# Patient Record
Sex: FEMALE | Race: WHITE | Employment: OTHER | URBAN - METROPOLITAN AREA
[De-identification: names, ages, dates, MRNs, and addresses within clinical notes are randomized per-mention and may not be internally consistent; named-entity substitution may affect disease eponyms.]

---

## 2017-03-24 ENCOUNTER — OFFICE VISIT (OUTPATIENT)
Dept: INTERNAL MEDICINE CLINIC | Age: 67
End: 2017-03-24

## 2017-03-24 VITALS
SYSTOLIC BLOOD PRESSURE: 132 MMHG | HEART RATE: 68 BPM | WEIGHT: 146 LBS | BODY MASS INDEX: 22.13 KG/M2 | DIASTOLIC BLOOD PRESSURE: 84 MMHG | HEIGHT: 68 IN

## 2017-03-24 DIAGNOSIS — E78.00 PURE HYPERCHOLESTEROLEMIA: ICD-10-CM

## 2017-03-24 DIAGNOSIS — M25.512 CHRONIC LEFT SHOULDER PAIN: Primary | ICD-10-CM

## 2017-03-24 DIAGNOSIS — Z11.59 NEED FOR HEPATITIS C SCREENING TEST: ICD-10-CM

## 2017-03-24 DIAGNOSIS — G89.29 CHRONIC LEFT SHOULDER PAIN: Primary | ICD-10-CM

## 2017-03-24 DIAGNOSIS — F41.9 ANXIETY: ICD-10-CM

## 2017-03-24 LAB
ALBUMIN SERPL-MCNC: 4.3 G/DL (ref 3.4–5)
ANION GAP SERPL CALCULATED.3IONS-SCNC: 12 MMOL/L (ref 3–16)
BUN BLDV-MCNC: 12 MG/DL (ref 7–20)
CALCIUM SERPL-MCNC: 9.5 MG/DL (ref 8.3–10.6)
CHLORIDE BLD-SCNC: 99 MMOL/L (ref 99–110)
CHOLESTEROL, TOTAL: 183 MG/DL (ref 0–199)
CO2: 27 MMOL/L (ref 21–32)
CREAT SERPL-MCNC: 0.6 MG/DL (ref 0.6–1.2)
GFR AFRICAN AMERICAN: >60
GFR NON-AFRICAN AMERICAN: >60
GLUCOSE BLD-MCNC: 92 MG/DL (ref 70–99)
HDLC SERPL-MCNC: 61 MG/DL (ref 40–60)
HEPATITIS C ANTIBODY INTERPRETATION: NORMAL
LDL CHOLESTEROL CALCULATED: 98 MG/DL
PHOSPHORUS: 3.9 MG/DL (ref 2.5–4.9)
POTASSIUM SERPL-SCNC: 4.6 MMOL/L (ref 3.5–5.1)
SODIUM BLD-SCNC: 138 MMOL/L (ref 136–145)
TRIGL SERPL-MCNC: 122 MG/DL (ref 0–150)
VLDLC SERPL CALC-MCNC: 24 MG/DL

## 2017-03-24 PROCEDURE — 99214 OFFICE O/P EST MOD 30 MIN: CPT | Performed by: INTERNAL MEDICINE

## 2017-03-24 RX ORDER — ALPRAZOLAM 0.5 MG/1
0.25 TABLET ORAL PRN
Qty: 20 TABLET | Refills: 0 | Status: SHIPPED | OUTPATIENT
Start: 2017-03-24 | End: 2018-03-09 | Stop reason: SDUPTHER

## 2017-03-24 RX ORDER — TIZANIDINE 4 MG/1
2 TABLET ORAL NIGHTLY PRN
Qty: 30 TABLET | Refills: 5 | Status: SHIPPED | OUTPATIENT
Start: 2017-03-24 | End: 2017-09-01 | Stop reason: ALTCHOICE

## 2017-04-05 ENCOUNTER — HOSPITAL ENCOUNTER (OUTPATIENT)
Dept: PHYSICAL THERAPY | Age: 67
Discharge: HOME | End: 2017-04-30
Attending: INTERNAL MEDICINE | Admitting: INTERNAL MEDICINE

## 2017-05-09 ENCOUNTER — HOSPITAL ENCOUNTER (OUTPATIENT)
Dept: PHYSICAL THERAPY | Age: 67
Discharge: HOME | End: 2017-05-09
Admitting: INTERNAL MEDICINE

## 2017-07-05 ENCOUNTER — TELEPHONE (OUTPATIENT)
Dept: INTERNAL MEDICINE CLINIC | Age: 67
End: 2017-07-05

## 2017-07-11 RX ORDER — PROPRANOLOL HYDROCHLORIDE 20 MG/1
TABLET ORAL
Qty: 90 TABLET | Refills: 3 | Status: SHIPPED | OUTPATIENT
Start: 2017-07-11 | End: 2017-12-04 | Stop reason: SDUPTHER

## 2017-08-07 RX ORDER — ATORVASTATIN CALCIUM 40 MG/1
TABLET, FILM COATED ORAL
Qty: 30 TABLET | Refills: 5 | Status: SHIPPED | OUTPATIENT
Start: 2017-08-07 | End: 2018-02-27 | Stop reason: SDUPTHER

## 2017-09-01 ENCOUNTER — OFFICE VISIT (OUTPATIENT)
Dept: INTERNAL MEDICINE CLINIC | Age: 67
End: 2017-09-01

## 2017-09-01 VITALS
DIASTOLIC BLOOD PRESSURE: 82 MMHG | BODY MASS INDEX: 21.46 KG/M2 | HEART RATE: 72 BPM | HEIGHT: 68 IN | SYSTOLIC BLOOD PRESSURE: 128 MMHG | WEIGHT: 141.6 LBS

## 2017-09-01 DIAGNOSIS — Z13.820 OSTEOPOROSIS SCREENING: ICD-10-CM

## 2017-09-01 DIAGNOSIS — I10 ESSENTIAL HYPERTENSION, BENIGN: Primary | ICD-10-CM

## 2017-09-01 DIAGNOSIS — F41.9 ANXIETY: ICD-10-CM

## 2017-09-01 DIAGNOSIS — E78.00 PURE HYPERCHOLESTEROLEMIA: ICD-10-CM

## 2017-09-01 PROCEDURE — 99214 OFFICE O/P EST MOD 30 MIN: CPT | Performed by: INTERNAL MEDICINE

## 2017-09-01 ASSESSMENT — PATIENT HEALTH QUESTIONNAIRE - PHQ9
2. FEELING DOWN, DEPRESSED OR HOPELESS: 1
SUM OF ALL RESPONSES TO PHQ9 QUESTIONS 1 & 2: 2
SUM OF ALL RESPONSES TO PHQ QUESTIONS 1-9: 2
1. LITTLE INTEREST OR PLEASURE IN DOING THINGS: 1

## 2017-10-03 ENCOUNTER — HOSPITAL ENCOUNTER (OUTPATIENT)
Dept: GENERAL RADIOLOGY | Age: 67
Discharge: HOME | End: 2017-10-03
Attending: INTERNAL MEDICINE | Admitting: INTERNAL MEDICINE

## 2017-10-03 DIAGNOSIS — Z13.820 ENCOUNTER FOR SCREENING FOR OSTEOPOROSIS: ICD-10-CM

## 2017-10-03 DIAGNOSIS — Z13.820 OSTEOPOROSIS SCREENING: ICD-10-CM

## 2017-11-10 ENCOUNTER — OFFICE VISIT (OUTPATIENT)
Dept: INTERNAL MEDICINE CLINIC | Age: 67
End: 2017-11-10

## 2017-11-10 VITALS
DIASTOLIC BLOOD PRESSURE: 80 MMHG | HEIGHT: 68 IN | SYSTOLIC BLOOD PRESSURE: 124 MMHG | BODY MASS INDEX: 21.52 KG/M2 | HEART RATE: 68 BPM | WEIGHT: 142 LBS

## 2017-11-10 DIAGNOSIS — Z23 NEED FOR PROPHYLACTIC VACCINATION AGAINST STREPTOCOCCUS PNEUMONIAE (PNEUMOCOCCUS): ICD-10-CM

## 2017-11-10 DIAGNOSIS — R53.82 CHRONIC FATIGUE: ICD-10-CM

## 2017-11-10 DIAGNOSIS — R07.2 SUBSTERNAL CHEST PAIN: Primary | ICD-10-CM

## 2017-11-10 LAB
A/G RATIO: 2.1 (ref 1.1–2.2)
ALBUMIN SERPL-MCNC: 4.4 G/DL (ref 3.4–5)
ALP BLD-CCNC: 67 U/L (ref 40–129)
ALT SERPL-CCNC: 18 U/L (ref 10–40)
ANION GAP SERPL CALCULATED.3IONS-SCNC: 15 MMOL/L (ref 3–16)
AST SERPL-CCNC: 27 U/L (ref 15–37)
BILIRUB SERPL-MCNC: 0.6 MG/DL (ref 0–1)
BUN BLDV-MCNC: 13 MG/DL (ref 7–20)
CALCIUM SERPL-MCNC: 10.6 MG/DL (ref 8.3–10.6)
CHLORIDE BLD-SCNC: 99 MMOL/L (ref 99–110)
CO2: 29 MMOL/L (ref 21–32)
CREAT SERPL-MCNC: 0.7 MG/DL (ref 0.6–1.2)
GFR AFRICAN AMERICAN: >60
GFR NON-AFRICAN AMERICAN: >60
GLOBULIN: 2.1 G/DL
GLUCOSE BLD-MCNC: 84 MG/DL (ref 70–99)
HCT VFR BLD CALC: 45.1 % (ref 36–48)
HEMOGLOBIN: 15.1 G/DL (ref 12–16)
MCH RBC QN AUTO: 30.1 PG (ref 26–34)
MCHC RBC AUTO-ENTMCNC: 33.5 G/DL (ref 31–36)
MCV RBC AUTO: 89.9 FL (ref 80–100)
PDW BLD-RTO: 13 % (ref 12.4–15.4)
PLATELET # BLD: 268 K/UL (ref 135–450)
PMV BLD AUTO: 9.5 FL (ref 5–10.5)
POTASSIUM SERPL-SCNC: 4.5 MMOL/L (ref 3.5–5.1)
RBC # BLD: 5.02 M/UL (ref 4–5.2)
SODIUM BLD-SCNC: 143 MMOL/L (ref 136–145)
TOTAL PROTEIN: 6.5 G/DL (ref 6.4–8.2)
TSH REFLEX FT4: 0.91 UIU/ML (ref 0.27–4.2)
VITAMIN B-12: 515 PG/ML (ref 211–911)
WBC # BLD: 9.2 K/UL (ref 4–11)

## 2017-11-10 PROCEDURE — 90472 IMMUNIZATION ADMIN EACH ADD: CPT | Performed by: INTERNAL MEDICINE

## 2017-11-10 PROCEDURE — 93000 ELECTROCARDIOGRAM COMPLETE: CPT | Performed by: INTERNAL MEDICINE

## 2017-11-10 PROCEDURE — 90732 PPSV23 VACC 2 YRS+ SUBQ/IM: CPT | Performed by: INTERNAL MEDICINE

## 2017-11-10 PROCEDURE — 90662 IIV NO PRSV INCREASED AG IM: CPT | Performed by: INTERNAL MEDICINE

## 2017-11-10 PROCEDURE — 99214 OFFICE O/P EST MOD 30 MIN: CPT | Performed by: INTERNAL MEDICINE

## 2017-11-10 PROCEDURE — 90471 IMMUNIZATION ADMIN: CPT | Performed by: INTERNAL MEDICINE

## 2017-11-10 RX ORDER — CITALOPRAM 20 MG/1
20 TABLET ORAL DAILY
Qty: 30 TABLET | Refills: 5 | Status: SHIPPED | OUTPATIENT
Start: 2017-11-10 | End: 2018-02-27 | Stop reason: SDUPTHER

## 2017-11-10 NOTE — PROGRESS NOTES
Subjective:      Patient ID: Neha Mccrary is a 69 y.o. female. CC: fatigue, Occasional chest discomfort, occasional shortness of breath  HPI  The patient is presenting with 2-3 month history of fatigue and occasional discomfort in her chest.    Chest pain:  She describes a discomfort located in the mid and left side of the chest.  It is intermittent. There are no known aggravating factors. It lasts for about 30 seconds then resolves spontaneously. It is described as mild in severity. The pain does not radiate. She is feeling generally fatigued over the last 2-3 months. She notices that as the day goes on she feels more tired. On occasion she finds herself having mild shortness of breath. Social History   Substance Use Topics    Smoking status: Former Smoker     Quit date: 6/6/2007    Smokeless tobacco: Never Used    Alcohol use No      Comment: none         Review of Systems  Occasional headaches with weather changes. These are chronic. Negative for pain with urination or difficulty urinating  Negative for change in bowel habits  She denies exertional or stress-induced chest pain  Objective:   Physical Exam  /80 (Site: Left Arm, Position: Sitting, Cuff Size: Large Adult)   Pulse 68   Ht 5' 8\" (1.727 m)   Wt 142 lb (64.4 kg)   BMI 21.59 kg/m²    GEN: WN/WD, NAD  CV: regular rate and rhythm, no murmurs rubs or gallops, pedal pulses are 2+ and symmetric  Resp: normal effort, clear auscultation bilaterally  No peripheral edema   Abd: soft, nontender to palpation. NEURO: CN intact, no motor deficits    EKG obtained in the office today and interpreted by me   NSR, 60bpm, normal intervals, no ST segment deviation, no t wave abnormalities  EKG is WNL     Assessment/Plan:      1. Substernal chest pain  Her symptoms, examination, and EKG are reassuring that a cardiac etiology is unlikely. I suspect this is a manifestation of her chronic fatigue syndrome which will be discussed below.   If her

## 2017-12-04 RX ORDER — PROPRANOLOL HYDROCHLORIDE 20 MG/1
TABLET ORAL
Qty: 90 TABLET | Refills: 5 | Status: SHIPPED | OUTPATIENT
Start: 2017-12-04 | End: 2018-02-27 | Stop reason: SDUPTHER

## 2017-12-18 ENCOUNTER — HOSPITAL ENCOUNTER (OUTPATIENT)
Dept: ENDOSCOPY | Age: 67
Discharge: HOME | End: 2017-12-18
Attending: INTERNAL MEDICINE | Admitting: INTERNAL MEDICINE

## 2018-02-26 ENCOUNTER — TELEPHONE (OUTPATIENT)
Dept: INTERNAL MEDICINE CLINIC | Age: 68
End: 2018-02-26

## 2018-02-26 DIAGNOSIS — E78.00 HYPERCHOLESTEROLEMIA: ICD-10-CM

## 2018-02-26 DIAGNOSIS — I10 ESSENTIAL HYPERTENSION: Primary | ICD-10-CM

## 2018-02-27 RX ORDER — CITALOPRAM 20 MG/1
20 TABLET ORAL DAILY
Qty: 90 TABLET | Refills: 1 | Status: SHIPPED | OUTPATIENT
Start: 2018-02-27 | End: 2018-09-13 | Stop reason: SDUPTHER

## 2018-02-27 RX ORDER — PROPRANOLOL HYDROCHLORIDE 20 MG/1
TABLET ORAL
Qty: 270 TABLET | Refills: 1 | Status: SHIPPED | OUTPATIENT
Start: 2018-02-27 | End: 2018-09-13 | Stop reason: SDUPTHER

## 2018-02-27 RX ORDER — ATORVASTATIN CALCIUM 40 MG/1
TABLET, FILM COATED ORAL
Qty: 90 TABLET | Refills: 1 | Status: SHIPPED | OUTPATIENT
Start: 2018-02-27 | End: 2018-09-13 | Stop reason: SDUPTHER

## 2018-03-09 ENCOUNTER — OFFICE VISIT (OUTPATIENT)
Dept: INTERNAL MEDICINE CLINIC | Age: 68
End: 2018-03-09

## 2018-03-09 VITALS
DIASTOLIC BLOOD PRESSURE: 84 MMHG | BODY MASS INDEX: 22.34 KG/M2 | SYSTOLIC BLOOD PRESSURE: 130 MMHG | HEIGHT: 68 IN | WEIGHT: 147.4 LBS | HEART RATE: 60 BPM

## 2018-03-09 DIAGNOSIS — I10 ESSENTIAL HYPERTENSION: ICD-10-CM

## 2018-03-09 DIAGNOSIS — I10 ESSENTIAL HYPERTENSION, BENIGN: Primary | ICD-10-CM

## 2018-03-09 DIAGNOSIS — F41.9 ANXIETY: ICD-10-CM

## 2018-03-09 DIAGNOSIS — E78.00 HYPERCHOLESTEROLEMIA: ICD-10-CM

## 2018-03-09 DIAGNOSIS — E78.00 PURE HYPERCHOLESTEROLEMIA: ICD-10-CM

## 2018-03-09 LAB
ALBUMIN SERPL-MCNC: 5 G/DL (ref 3.4–5)
ANION GAP SERPL CALCULATED.3IONS-SCNC: 15 MMOL/L (ref 3–16)
BUN BLDV-MCNC: 12 MG/DL (ref 7–20)
CALCIUM SERPL-MCNC: 9.6 MG/DL (ref 8.3–10.6)
CHLORIDE BLD-SCNC: 101 MMOL/L (ref 99–110)
CHOLESTEROL, FASTING: 172 MG/DL (ref 0–199)
CO2: 28 MMOL/L (ref 21–32)
CREAT SERPL-MCNC: 0.6 MG/DL (ref 0.6–1.2)
GFR AFRICAN AMERICAN: >60
GFR NON-AFRICAN AMERICAN: >60
GLUCOSE BLD-MCNC: 99 MG/DL (ref 70–99)
HDLC SERPL-MCNC: 65 MG/DL (ref 40–60)
LDL CHOLESTEROL CALCULATED: 79 MG/DL
PHOSPHORUS: 4.4 MG/DL (ref 2.5–4.9)
POTASSIUM SERPL-SCNC: 4.6 MMOL/L (ref 3.5–5.1)
SODIUM BLD-SCNC: 144 MMOL/L (ref 136–145)
TRIGLYCERIDE, FASTING: 138 MG/DL (ref 0–150)
VLDLC SERPL CALC-MCNC: 28 MG/DL

## 2018-03-09 PROCEDURE — 99214 OFFICE O/P EST MOD 30 MIN: CPT | Performed by: INTERNAL MEDICINE

## 2018-03-09 RX ORDER — ALPRAZOLAM 0.5 MG/1
0.25 TABLET ORAL PRN
Qty: 20 TABLET | Refills: 0 | Status: SHIPPED | OUTPATIENT
Start: 2018-03-09 | End: 2019-03-14 | Stop reason: SDUPTHER

## 2018-03-09 NOTE — PROGRESS NOTES
Subjective:      Patient ID: Neha Horowitz is a 69 y.o. female. CC: HTN, HLD, anxiety  HPI  HTN: Tolerating medications well and taking them as directed. No symptoms concerning for end organ damage are present. HLD: she is taking atorvastatin as directed. She is tolerating this well. Anxiety: she has chronic anxiety with associated panic attacks. Aggravating factors include driving and being in unfamiliar environments. She gets relief with alprazolam which she uses infrequently. She also takes citalopram daily. Social History   Substance Use Topics    Smoking status: Former Smoker     Packs/day: 1.50     Years: 40.00     Quit date: 6/6/2007    Smokeless tobacco: Never Used    Alcohol use No      Comment: none       Review of Systems  CV: Neg for chest pain  RESP: neg for dyspnea   : no urinary problems  GI: Reg BM's  MSK: +joint stiffness    Objective:   Physical Exam  Blood pressure 130/84, pulse 60, height 5' 8\" (1.727 m), weight 147 lb 6.4 oz (66.9 kg). GEN: WN/WD, NAD  CV: regular rate and rhythm, no murmurs rubs or gallops  Resp: normal effort, clear auscultation bilaterally  No peripheral edema   Abd: soft, nontender to palpation.    Psych: Normal mood and affect    Lab Results   Component Value Date    CREATININE 0.7 11/10/2017    BUN 13 11/10/2017     11/10/2017    K 4.5 11/10/2017    CL 99 11/10/2017    CO2 29 11/10/2017      Lab Results   Component Value Date    CHOL 183 03/24/2017    CHOL 160 03/21/2016    CHOL 153 06/15/2015    CHOL 95 06/15/2015     Lab Results   Component Value Date    TRIG 122 03/24/2017    TRIG 89 03/21/2016    TRIG 89 06/15/2015     Lab Results   Component Value Date    HDL 61 (H) 03/24/2017    HDL 61 (H) 03/21/2016    HDL 58 06/15/2015     Lab Results   Component Value Date    LDLCALC 98 03/24/2017    LDLCALC 81 03/21/2016    LDLCALC 77 06/15/2015     Lab Results   Component Value Date    LABVLDL 24 03/24/2017    LABVLDL 18 03/21/2016    LABVLDL 28

## 2018-03-14 LAB
6-ACETYLMORPHINE: NOT DETECTED
7-AMINOCLONAZEPAM: NOT DETECTED
ALPHA-OH-ALPRAZOLAM: NOT DETECTED
ALPRAZOLAM: NOT DETECTED
AMPHETAMINE: NOT DETECTED
BARBITURATES: NOT DETECTED
BENZOYLECGONINE: NOT DETECTED
BUPRENORPHINE: NOT DETECTED
CARISOPRODOL: NOT DETECTED
CLONAZEPAM: NOT DETECTED
CODEINE: NOT DETECTED
CREATININE URINE: 20.6 MG/DL (ref 20–400)
DIAZEPAM: NOT DETECTED
DRUGS EXPECTED: NORMAL
EER PAIN MGT DRUG PANEL, HIGH RES/EMIT U: NORMAL
ETHYL GLUCURONIDE: NOT DETECTED
FENTANYL: NOT DETECTED
HYDROCODONE: NOT DETECTED
HYDROMORPHONE: NOT DETECTED
LORAZEPAM: NOT DETECTED
MARIJUANA METABOLITE: NOT DETECTED
MDA: NOT DETECTED
MDEA: NOT DETECTED
MDMA URINE: NOT DETECTED
MEPERIDINE: NOT DETECTED
METHADONE: NOT DETECTED
METHAMPHETAMINE: NOT DETECTED
METHYLPHENIDATE: NOT DETECTED
MIDAZOLAM: NOT DETECTED
MORPHINE: NOT DETECTED
NORBUPRENORPHINE, FREE: NOT DETECTED
NORDIAZEPAM: NOT DETECTED
NORFENTANYL: NOT DETECTED
NORHYDROCODONE, URINE: NOT DETECTED
NOROXYCODONE: NOT DETECTED
NOROXYMORPHONE, URINE: NOT DETECTED
OXAZEPAM: NOT DETECTED
OXYCODONE: NOT DETECTED
OXYMORPHONE: NOT DETECTED
PAIN MANAGEMENT DRUG PANEL: NORMAL
PAIN MANAGEMENT DRUG PANEL: NORMAL
PCP: NOT DETECTED
PHENTERMINE: NOT DETECTED
PROPOXYPHENE: NOT DETECTED
TAPENTADOL, URINE: NOT DETECTED
TAPENTADOL-O-SULFATE, URINE: NOT DETECTED
TEMAZEPAM: NOT DETECTED
TRAMADOL: NOT DETECTED
ZOLPIDEM: NOT DETECTED

## 2018-06-06 ENCOUNTER — OFFICE VISIT (OUTPATIENT)
Dept: INTERNAL MEDICINE CLINIC | Age: 68
End: 2018-06-06

## 2018-06-06 VITALS
BODY MASS INDEX: 22.35 KG/M2 | TEMPERATURE: 97.8 F | HEART RATE: 76 BPM | WEIGHT: 147 LBS | DIASTOLIC BLOOD PRESSURE: 86 MMHG | SYSTOLIC BLOOD PRESSURE: 134 MMHG

## 2018-06-06 DIAGNOSIS — J00 ACUTE NASOPHARYNGITIS: Primary | ICD-10-CM

## 2018-06-06 PROCEDURE — 99213 OFFICE O/P EST LOW 20 MIN: CPT | Performed by: NURSE PRACTITIONER

## 2018-06-06 ASSESSMENT — ENCOUNTER SYMPTOMS
COUGH: 1
VOICE CHANGE: 1
SORE THROAT: 1
WHEEZING: 0
RHINORRHEA: 1
SINUS PAIN: 0

## 2018-06-08 ENCOUNTER — TELEPHONE (OUTPATIENT)
Dept: INTERNAL MEDICINE CLINIC | Age: 68
End: 2018-06-08

## 2018-06-08 RX ORDER — AZITHROMYCIN 250 MG/1
TABLET, FILM COATED ORAL
Qty: 1 PACKET | Refills: 0 | Status: SHIPPED | OUTPATIENT
Start: 2018-06-08 | End: 2018-06-18

## 2018-08-31 ENCOUNTER — TELEPHONE (OUTPATIENT)
Dept: FAMILY MEDICINE CLINIC | Age: 68
End: 2018-08-31

## 2018-09-10 ENCOUNTER — OFFICE VISIT (OUTPATIENT)
Dept: INTERNAL MEDICINE CLINIC | Age: 68
End: 2018-09-10

## 2018-09-10 VITALS
HEIGHT: 68 IN | BODY MASS INDEX: 22.43 KG/M2 | SYSTOLIC BLOOD PRESSURE: 120 MMHG | DIASTOLIC BLOOD PRESSURE: 74 MMHG | WEIGHT: 148 LBS | HEART RATE: 76 BPM

## 2018-09-10 DIAGNOSIS — M76.62 ACHILLES TENDINITIS, LEFT LEG: Primary | ICD-10-CM

## 2018-09-10 PROCEDURE — 99213 OFFICE O/P EST LOW 20 MIN: CPT | Performed by: INTERNAL MEDICINE

## 2018-09-10 ASSESSMENT — PATIENT HEALTH QUESTIONNAIRE - PHQ9
SUM OF ALL RESPONSES TO PHQ QUESTIONS 1-9: 2
SUM OF ALL RESPONSES TO PHQ9 QUESTIONS 1 & 2: 2
SUM OF ALL RESPONSES TO PHQ QUESTIONS 1-9: 2
2. FEELING DOWN, DEPRESSED OR HOPELESS: 1
1. LITTLE INTEREST OR PLEASURE IN DOING THINGS: 1

## 2018-09-13 ENCOUNTER — OFFICE VISIT (OUTPATIENT)
Dept: INTERNAL MEDICINE CLINIC | Age: 68
End: 2018-09-13

## 2018-09-13 VITALS
HEIGHT: 68 IN | DIASTOLIC BLOOD PRESSURE: 82 MMHG | SYSTOLIC BLOOD PRESSURE: 126 MMHG | WEIGHT: 147.8 LBS | BODY MASS INDEX: 22.4 KG/M2 | HEART RATE: 68 BPM

## 2018-09-13 DIAGNOSIS — E78.00 PURE HYPERCHOLESTEROLEMIA: ICD-10-CM

## 2018-09-13 DIAGNOSIS — I10 ESSENTIAL HYPERTENSION, BENIGN: Primary | ICD-10-CM

## 2018-09-13 DIAGNOSIS — F41.9 ANXIETY: ICD-10-CM

## 2018-09-13 PROCEDURE — 99214 OFFICE O/P EST MOD 30 MIN: CPT | Performed by: INTERNAL MEDICINE

## 2018-09-13 RX ORDER — OMEPRAZOLE 10 MG/1
10 CAPSULE, DELAYED RELEASE ORAL DAILY
COMMUNITY
End: 2019-03-14

## 2018-09-13 RX ORDER — PROPRANOLOL HYDROCHLORIDE 20 MG/1
TABLET ORAL
Qty: 270 TABLET | Refills: 3 | Status: SHIPPED | OUTPATIENT
Start: 2018-09-13 | End: 2019-09-10 | Stop reason: SDUPTHER

## 2018-09-13 RX ORDER — CITALOPRAM 20 MG/1
20 TABLET ORAL DAILY
Qty: 90 TABLET | Refills: 3 | Status: SHIPPED | OUTPATIENT
Start: 2018-09-13 | End: 2019-09-10 | Stop reason: SDUPTHER

## 2018-09-13 RX ORDER — ATORVASTATIN CALCIUM 40 MG/1
TABLET, FILM COATED ORAL
Qty: 90 TABLET | Refills: 3 | Status: SHIPPED | OUTPATIENT
Start: 2018-09-13 | End: 2019-09-10 | Stop reason: SDUPTHER

## 2018-09-13 NOTE — PROGRESS NOTES
Results   Component Value Date    LOUANN 2.6 06/15/2015      A/P  1. Essential hypertension, benign  Well controlled  BW UTD  The current medical regimen is effective;  continue present plan and medications. 2. Pure hypercholesterolemia  Well controlled  The current medical regimen is effective;  continue present plan and medications.      3. Anxiety  Slight increase in anxiety due to external stress (friend diagnosed with Parkinson's)  Recommended visit with Dr. Richmond Romero  Continue citalopram    RTO 6 months or PRN

## 2018-10-01 ENCOUNTER — OFFICE VISIT (OUTPATIENT)
Dept: PSYCHOLOGY | Age: 68
End: 2018-10-01
Payer: COMMERCIAL

## 2018-10-01 DIAGNOSIS — F43.22 ADJUSTMENT DISORDER WITH ANXIOUS MOOD: Primary | ICD-10-CM

## 2018-10-01 PROCEDURE — 90791 PSYCH DIAGNOSTIC EVALUATION: CPT | Performed by: PSYCHOLOGIST

## 2018-10-01 ASSESSMENT — PATIENT HEALTH QUESTIONNAIRE - PHQ9
2. FEELING DOWN, DEPRESSED OR HOPELESS: 1
8. MOVING OR SPEAKING SO SLOWLY THAT OTHER PEOPLE COULD HAVE NOTICED. OR THE OPPOSITE, BEING SO FIGETY OR RESTLESS THAT YOU HAVE BEEN MOVING AROUND A LOT MORE THAN USUAL: 0
3. TROUBLE FALLING OR STAYING ASLEEP: 1
SUM OF ALL RESPONSES TO PHQ QUESTIONS 1-9: 6
9. THOUGHTS THAT YOU WOULD BE BETTER OFF DEAD, OR OF HURTING YOURSELF: 0
SUM OF ALL RESPONSES TO PHQ9 QUESTIONS 1 & 2: 2
6. FEELING BAD ABOUT YOURSELF - OR THAT YOU ARE A FAILURE OR HAVE LET YOURSELF OR YOUR FAMILY DOWN: 0
1. LITTLE INTEREST OR PLEASURE IN DOING THINGS: 1
SUM OF ALL RESPONSES TO PHQ QUESTIONS 1-9: 6
10. IF YOU CHECKED OFF ANY PROBLEMS, HOW DIFFICULT HAVE THESE PROBLEMS MADE IT FOR YOU TO DO YOUR WORK, TAKE CARE OF THINGS AT HOME, OR GET ALONG WITH OTHER PEOPLE: 1
4. FEELING TIRED OR HAVING LITTLE ENERGY: 2
5. POOR APPETITE OR OVEREATING: 1
7. TROUBLE CONCENTRATING ON THINGS, SUCH AS READING THE NEWSPAPER OR WATCHING TELEVISION: 0

## 2018-11-14 ENCOUNTER — OFFICE VISIT (OUTPATIENT)
Dept: PSYCHOLOGY | Age: 68
End: 2018-11-14
Payer: COMMERCIAL

## 2018-11-14 DIAGNOSIS — F43.22 ADJUSTMENT DISORDER WITH ANXIOUS MOOD: Primary | ICD-10-CM

## 2018-11-14 PROCEDURE — 90832 PSYTX W PT 30 MINUTES: CPT | Performed by: PSYCHOLOGIST

## 2018-11-14 NOTE — PATIENT INSTRUCTIONS
Follow-up with Dr. Gely Rodriguez as needed.  If the following occur, call our office to schedule an appointment:   - If spending a lot more time worrying  - If struggling with sleep more often  - If withdrawing more in the afternoons

## 2018-11-14 NOTE — PROGRESS NOTES
boyfriend     Other Topics Concern    Not on file     Social History Narrative    No narrative on file     TOBACCO:   reports that she quit smoking about 11 years ago. She has a 60.00 pack-year smoking history. She has never used smokeless tobacco.  ETOH:   reports that she does not drink alcohol. Diagnosis:    Adjustment disorder with anxiety    Plan:  Pt interventions:  Trained in improving communication skills, Discussed and problem-solved barriers in adhering to behavioral change plan, Identified maladaptive thoughts and Collaboratively set goals with pt re: communication and relapse prevention        Documentation was done using voice recognition dragon software. Every effort was made to ensure accuracy; however, inadvertent, unintentional computerized transcription errors may be present.

## 2018-11-20 ENCOUNTER — IMMUNIZATION (OUTPATIENT)
Dept: INTERNAL MEDICINE CLINIC | Age: 68
End: 2018-11-20
Payer: COMMERCIAL

## 2018-11-20 DIAGNOSIS — Z23 NEED FOR INFLUENZA VACCINATION: Primary | ICD-10-CM

## 2018-11-20 PROCEDURE — 90662 IIV NO PRSV INCREASED AG IM: CPT | Performed by: INTERNAL MEDICINE

## 2018-11-20 PROCEDURE — 90471 IMMUNIZATION ADMIN: CPT | Performed by: INTERNAL MEDICINE

## 2019-03-14 ENCOUNTER — OFFICE VISIT (OUTPATIENT)
Dept: INTERNAL MEDICINE CLINIC | Age: 69
End: 2019-03-14
Payer: COMMERCIAL

## 2019-03-14 VITALS
WEIGHT: 151.8 LBS | SYSTOLIC BLOOD PRESSURE: 114 MMHG | DIASTOLIC BLOOD PRESSURE: 72 MMHG | HEIGHT: 68 IN | HEART RATE: 76 BPM | BODY MASS INDEX: 23.01 KG/M2

## 2019-03-14 DIAGNOSIS — E78.00 PURE HYPERCHOLESTEROLEMIA: ICD-10-CM

## 2019-03-14 DIAGNOSIS — F41.9 ANXIETY: ICD-10-CM

## 2019-03-14 DIAGNOSIS — I10 ESSENTIAL HYPERTENSION, BENIGN: Primary | ICD-10-CM

## 2019-03-14 LAB
ALBUMIN SERPL-MCNC: 4.8 G/DL (ref 3.4–5)
ANION GAP SERPL CALCULATED.3IONS-SCNC: 17 MMOL/L (ref 3–16)
BUN BLDV-MCNC: 17 MG/DL (ref 7–20)
CALCIUM SERPL-MCNC: 10.1 MG/DL (ref 8.3–10.6)
CHLORIDE BLD-SCNC: 99 MMOL/L (ref 99–110)
CHOLESTEROL, TOTAL: 170 MG/DL (ref 0–199)
CO2: 25 MMOL/L (ref 21–32)
CREAT SERPL-MCNC: 0.7 MG/DL (ref 0.6–1.2)
GFR AFRICAN AMERICAN: >60
GFR NON-AFRICAN AMERICAN: >60
GLUCOSE BLD-MCNC: 93 MG/DL (ref 70–99)
HDLC SERPL-MCNC: 65 MG/DL (ref 40–60)
LDL CHOLESTEROL CALCULATED: 82 MG/DL
PHOSPHORUS: 4.3 MG/DL (ref 2.5–4.9)
POTASSIUM SERPL-SCNC: 4.6 MMOL/L (ref 3.5–5.1)
SODIUM BLD-SCNC: 141 MMOL/L (ref 136–145)
TRIGL SERPL-MCNC: 113 MG/DL (ref 0–150)
VLDLC SERPL CALC-MCNC: 23 MG/DL

## 2019-03-14 PROCEDURE — 99214 OFFICE O/P EST MOD 30 MIN: CPT | Performed by: INTERNAL MEDICINE

## 2019-03-14 RX ORDER — ALPRAZOLAM 0.5 MG/1
0.25 TABLET ORAL PRN
Qty: 20 TABLET | Refills: 0 | Status: SHIPPED | OUTPATIENT
Start: 2019-03-14 | End: 2020-05-19 | Stop reason: SDUPTHER

## 2019-03-14 RX ORDER — ALPRAZOLAM 0.5 MG/1
0.5 TABLET ORAL NIGHTLY PRN
COMMUNITY
End: 2019-03-14 | Stop reason: SDUPTHER

## 2019-09-10 ENCOUNTER — OFFICE VISIT (OUTPATIENT)
Dept: INTERNAL MEDICINE CLINIC | Age: 69
End: 2019-09-10
Payer: COMMERCIAL

## 2019-09-10 VITALS
HEART RATE: 72 BPM | BODY MASS INDEX: 22.25 KG/M2 | WEIGHT: 146.8 LBS | HEIGHT: 68 IN | DIASTOLIC BLOOD PRESSURE: 68 MMHG | SYSTOLIC BLOOD PRESSURE: 122 MMHG

## 2019-09-10 DIAGNOSIS — E78.00 PURE HYPERCHOLESTEROLEMIA: ICD-10-CM

## 2019-09-10 DIAGNOSIS — F41.9 ANXIETY: ICD-10-CM

## 2019-09-10 DIAGNOSIS — I10 ESSENTIAL HYPERTENSION, BENIGN: Primary | ICD-10-CM

## 2019-09-10 DIAGNOSIS — Z87.891 PERSONAL HISTORY OF TOBACCO USE: ICD-10-CM

## 2019-09-10 PROCEDURE — G0296 VISIT TO DETERM LDCT ELIG: HCPCS | Performed by: INTERNAL MEDICINE

## 2019-09-10 PROCEDURE — 99214 OFFICE O/P EST MOD 30 MIN: CPT | Performed by: INTERNAL MEDICINE

## 2019-09-10 RX ORDER — CITALOPRAM 20 MG/1
20 TABLET ORAL DAILY
Qty: 90 TABLET | Refills: 3 | Status: SHIPPED | OUTPATIENT
Start: 2019-09-10 | End: 2020-09-08

## 2019-09-10 RX ORDER — PROPRANOLOL HYDROCHLORIDE 20 MG/1
TABLET ORAL
Qty: 270 TABLET | Refills: 3 | Status: SHIPPED | OUTPATIENT
Start: 2019-09-10 | End: 2020-09-08

## 2019-09-10 RX ORDER — ATORVASTATIN CALCIUM 40 MG/1
TABLET, FILM COATED ORAL
Qty: 90 TABLET | Refills: 3 | Status: SHIPPED | OUTPATIENT
Start: 2019-09-10 | End: 2020-12-07

## 2019-09-10 NOTE — PROGRESS NOTES
03/21/2016     Lab Results   Component Value Date    HDL 65 (H) 03/14/2019    HDL 65 (H) 03/09/2018    HDL 61 (H) 03/24/2017     Lab Results   Component Value Date    LDLCALC 82 03/14/2019    LDLCALC 79 03/09/2018    LDLCALC 98 03/24/2017     Lab Results   Component Value Date    LABVLDL 23 03/14/2019    LABVLDL 28 03/09/2018    LABVLDL 24 03/24/2017     Lab Results   Component Value Date    CHOLHDLRATIO 2.6 06/15/2015      A/P  1. Essential hypertension, benign  Well-controlled. Blood work is up-to-date. Continue propranolol. 2. Pure hypercholesterolemia  Well-controlled. Continue atorvastatin. 3. Anxiety  Stable, controlled. Continue citalopram and as needed Xanax. 4. Personal history of tobacco use    - UT VISIT TO DISCUSS LUNG CA SCREEN W LDCT  - CT Lung Screening; Future       Low Dose CT (LDCT) Lung Screening criteria met   Age 50-69   Pack year smoking >30   Still smoking or less than 15 year since quit   No sign or symptoms of lung cancer   > 11 months since last LDCT     Risks and benefits of lung cancer screening with LDCT scans discussed:    Significance of positive screen - False-positive LDCT results often occur. 95% of all positive results do not lead to a diagnosis of cancer. Usually further imaging can resolve most false-positive results; however, some patients may require invasive procedures. Over diagnosis risk - 10% to 12% of screen-detected lung cancer cases are over diagnosed--that is, the cancer would not have been detected in the patient's lifetime without the screening. Need for follow up screens annually to continue lung cancer screening effectiveness     Risks associated with radiation from annual LDCT- Radiation exposure is about the same as for a mammogram, which is about 1/3 of the annual background radiation exposure from everyday life. Starting screening at age 54 is not likely to increase cancer risk from radiation exposure.     Patients with comorbidities resulting

## 2019-10-31 DIAGNOSIS — Z23 NEED FOR INFLUENZA VACCINATION: Primary | ICD-10-CM

## 2019-10-31 PROCEDURE — 90471 IMMUNIZATION ADMIN: CPT | Performed by: INTERNAL MEDICINE

## 2019-10-31 PROCEDURE — 90653 IIV ADJUVANT VACCINE IM: CPT | Performed by: INTERNAL MEDICINE

## 2019-11-20 ENCOUNTER — HOSPITAL ENCOUNTER (OUTPATIENT)
Dept: CT IMAGING | Age: 69
Discharge: HOME | End: 2019-11-20
Payer: COMMERCIAL

## 2019-11-20 ENCOUNTER — TELEPHONE (OUTPATIENT)
Dept: INTERNAL MEDICINE CLINIC | Age: 69
End: 2019-11-20

## 2019-11-20 DIAGNOSIS — Z87.891 PERSONAL HISTORY OF TOBACCO USE: ICD-10-CM

## 2019-11-20 PROCEDURE — G0297 LDCT FOR LUNG CA SCREEN: HCPCS

## 2019-11-27 ENCOUNTER — TELEPHONE (OUTPATIENT)
Dept: PULMONOLOGY | Age: 69
End: 2019-11-27

## 2019-11-27 ENCOUNTER — OFFICE VISIT (OUTPATIENT)
Dept: PULMONOLOGY | Age: 69
End: 2019-11-27
Payer: COMMERCIAL

## 2019-11-27 VITALS
BODY MASS INDEX: 22.2 KG/M2 | RESPIRATION RATE: 16 BRPM | SYSTOLIC BLOOD PRESSURE: 120 MMHG | DIASTOLIC BLOOD PRESSURE: 70 MMHG | WEIGHT: 146 LBS | HEART RATE: 98 BPM | OXYGEN SATURATION: 97 %

## 2019-11-27 DIAGNOSIS — R59.0 MEDIASTINAL ADENOPATHY: ICD-10-CM

## 2019-11-27 DIAGNOSIS — R91.8 LUNG MASS: Primary | ICD-10-CM

## 2019-11-27 PROCEDURE — 99244 OFF/OP CNSLTJ NEW/EST MOD 40: CPT | Performed by: INTERNAL MEDICINE

## 2019-11-27 ASSESSMENT — ENCOUNTER SYMPTOMS
DIARRHEA: 0
CONSTIPATION: 0
SINUS PRESSURE: 0
STRIDOR: 0
CHOKING: 0
ABDOMINAL PAIN: 0
SHORTNESS OF BREATH: 0
VOICE CHANGE: 0
ANAL BLEEDING: 0
SORE THROAT: 0
WHEEZING: 0
RHINORRHEA: 0
CHEST TIGHTNESS: 0
ABDOMINAL DISTENTION: 0
BLOOD IN STOOL: 0
BACK PAIN: 0
APNEA: 0
COUGH: 0

## 2019-12-03 ENCOUNTER — ANESTHESIA EVENT (OUTPATIENT)
Dept: ENDOSCOPY | Age: 69
End: 2019-12-03
Payer: COMMERCIAL

## 2019-12-03 ENCOUNTER — HOSPITAL ENCOUNTER (OUTPATIENT)
Age: 69
End: 2019-12-03
Payer: COMMERCIAL

## 2019-12-04 ENCOUNTER — HOSPITAL ENCOUNTER (OUTPATIENT)
Dept: PET IMAGING | Age: 69
Discharge: HOME | End: 2019-12-04
Payer: COMMERCIAL

## 2019-12-04 VITALS — HEIGHT: 68 IN | WEIGHT: 146 LBS | BODY MASS INDEX: 22.13 KG/M2

## 2019-12-04 DIAGNOSIS — R91.8 LUNG MASS: ICD-10-CM

## 2019-12-04 DIAGNOSIS — R59.0 MEDIASTINAL ADENOPATHY: ICD-10-CM

## 2019-12-04 PROCEDURE — A9552 F18 FDG: HCPCS | Performed by: INTERNAL MEDICINE

## 2019-12-04 PROCEDURE — 3430000000 HC RX DIAGNOSTIC RADIOPHARMACEUTICAL: Performed by: INTERNAL MEDICINE

## 2019-12-04 PROCEDURE — 78815 PET IMAGE W/CT SKULL-THIGH: CPT

## 2019-12-04 RX ORDER — FLUDEOXYGLUCOSE F 18 200 MCI/ML
16.48 INJECTION, SOLUTION INTRAVENOUS
Status: COMPLETED | OUTPATIENT
Start: 2019-12-04 | End: 2019-12-04

## 2019-12-04 RX ADMIN — FLUDEOXYGLUCOSE F 18 16.48 MILLICURIE: 200 INJECTION, SOLUTION INTRAVENOUS at 11:42

## 2019-12-06 ENCOUNTER — APPOINTMENT (OUTPATIENT)
Dept: GENERAL RADIOLOGY | Age: 69
End: 2019-12-06
Attending: INTERNAL MEDICINE
Payer: COMMERCIAL

## 2019-12-06 ENCOUNTER — ANESTHESIA (OUTPATIENT)
Dept: ENDOSCOPY | Age: 69
End: 2019-12-06
Payer: COMMERCIAL

## 2019-12-06 ENCOUNTER — HOSPITAL ENCOUNTER (OUTPATIENT)
Age: 69
Setting detail: OUTPATIENT SURGERY
Discharge: HOME | End: 2019-12-06
Attending: INTERNAL MEDICINE | Admitting: INTERNAL MEDICINE
Payer: COMMERCIAL

## 2019-12-06 VITALS
RESPIRATION RATE: 4 BRPM | SYSTOLIC BLOOD PRESSURE: 165 MMHG | DIASTOLIC BLOOD PRESSURE: 80 MMHG | OXYGEN SATURATION: 99 %

## 2019-12-06 VITALS
OXYGEN SATURATION: 96 % | SYSTOLIC BLOOD PRESSURE: 176 MMHG | RESPIRATION RATE: 16 BRPM | HEART RATE: 80 BPM | TEMPERATURE: 97.5 F | DIASTOLIC BLOOD PRESSURE: 77 MMHG

## 2019-12-06 LAB
HCT VFR BLD CALC: 46.2 % (ref 36–48)
HEMOGLOBIN: 15.7 G/DL (ref 12–16)
INR BLD: 0.96 (ref 0.86–1.14)
MCH RBC QN AUTO: 30 PG (ref 26–34)
MCHC RBC AUTO-ENTMCNC: 33.9 G/DL (ref 31–36)
MCV RBC AUTO: 88.4 FL (ref 80–100)
PDW BLD-RTO: 13.3 % (ref 12.4–15.4)
PLATELET # BLD: 275 K/UL (ref 135–450)
PMV BLD AUTO: 8.9 FL (ref 5–10.5)
PROTHROMBIN TIME: 11.1 SEC (ref 10–13.2)
RBC # BLD: 5.22 M/UL (ref 4–5.2)
WBC # BLD: 8.2 K/UL (ref 4–11)

## 2019-12-06 PROCEDURE — 88172 CYTP DX EVAL FNA 1ST EA SITE: CPT

## 2019-12-06 PROCEDURE — 88112 CYTOPATH CELL ENHANCE TECH: CPT

## 2019-12-06 PROCEDURE — 3209999900 FLUORO FOR SURGICAL PROCEDURES

## 2019-12-06 PROCEDURE — 3609155400 HC ADD ON COMPUTER ASSISTED NAVIGATION: Performed by: INTERNAL MEDICINE

## 2019-12-06 PROCEDURE — 2709999900 HC NON-CHARGEABLE SUPPLY: Performed by: INTERNAL MEDICINE

## 2019-12-06 PROCEDURE — 3609011100 HC BRONCHOSCOPY BRUSHINGS: Performed by: INTERNAL MEDICINE

## 2019-12-06 PROCEDURE — 85027 COMPLETE CBC AUTOMATED: CPT

## 2019-12-06 PROCEDURE — 7100000001 HC PACU RECOVERY - ADDTL 15 MIN: Performed by: INTERNAL MEDICINE

## 2019-12-06 PROCEDURE — 71045 X-RAY EXAM CHEST 1 VIEW: CPT

## 2019-12-06 PROCEDURE — 7100000000 HC PACU RECOVERY - FIRST 15 MIN: Performed by: INTERNAL MEDICINE

## 2019-12-06 PROCEDURE — 2500000003 HC RX 250 WO HCPCS: Performed by: NURSE ANESTHETIST, CERTIFIED REGISTERED

## 2019-12-06 PROCEDURE — 88305 TISSUE EXAM BY PATHOLOGIST: CPT

## 2019-12-06 PROCEDURE — 7100000011 HC PHASE II RECOVERY - ADDTL 15 MIN: Performed by: INTERNAL MEDICINE

## 2019-12-06 PROCEDURE — C1725 CATH, TRANSLUMIN NON-LASER: HCPCS | Performed by: INTERNAL MEDICINE

## 2019-12-06 PROCEDURE — 2720000010 HC SURG SUPPLY STERILE: Performed by: INTERNAL MEDICINE

## 2019-12-06 PROCEDURE — C1887 CATHETER, GUIDING: HCPCS | Performed by: INTERNAL MEDICINE

## 2019-12-06 PROCEDURE — 85610 PROTHROMBIN TIME: CPT

## 2019-12-06 PROCEDURE — 31623 DX BRONCHOSCOPE/BRUSH: CPT | Performed by: INTERNAL MEDICINE

## 2019-12-06 PROCEDURE — 88341 IMHCHEM/IMCYTCHM EA ADD ANTB: CPT

## 2019-12-06 PROCEDURE — 3609020000 HC BRONCHOSCOPY W/EBUS FNA: Performed by: INTERNAL MEDICINE

## 2019-12-06 PROCEDURE — 31624 DX BRONCHOSCOPE/LAVAGE: CPT | Performed by: INTERNAL MEDICINE

## 2019-12-06 PROCEDURE — 3609011300 HC BRONCHOSCOPY BRONCHIAL/ENDOBRNCL BX 1+ SITES: Performed by: INTERNAL MEDICINE

## 2019-12-06 PROCEDURE — 3700000000 HC ANESTHESIA ATTENDED CARE: Performed by: INTERNAL MEDICINE

## 2019-12-06 PROCEDURE — 6360000002 HC RX W HCPCS: Performed by: NURSE ANESTHETIST, CERTIFIED REGISTERED

## 2019-12-06 PROCEDURE — 88342 IMHCHEM/IMCYTCHM 1ST ANTB: CPT

## 2019-12-06 PROCEDURE — 2580000003 HC RX 258: Performed by: FAMILY MEDICINE

## 2019-12-06 PROCEDURE — 88173 CYTOPATH EVAL FNA REPORT: CPT

## 2019-12-06 PROCEDURE — 88177 CYTP FNA EVAL EA ADDL: CPT

## 2019-12-06 PROCEDURE — 7100000010 HC PHASE II RECOVERY - FIRST 15 MIN: Performed by: INTERNAL MEDICINE

## 2019-12-06 PROCEDURE — 36415 COLL VENOUS BLD VENIPUNCTURE: CPT

## 2019-12-06 PROCEDURE — 31652 BRONCH EBUS SAMPLNG 1/2 NODE: CPT | Performed by: INTERNAL MEDICINE

## 2019-12-06 PROCEDURE — 31628 BRONCHOSCOPY/LUNG BX EACH: CPT | Performed by: INTERNAL MEDICINE

## 2019-12-06 PROCEDURE — 31627 NAVIGATIONAL BRONCHOSCOPY: CPT | Performed by: INTERNAL MEDICINE

## 2019-12-06 PROCEDURE — 3609010800 HC BRONCHOSCOPY ALVEOLAR LAVAGE: Performed by: INTERNAL MEDICINE

## 2019-12-06 PROCEDURE — 3700000001 HC ADD 15 MINUTES (ANESTHESIA): Performed by: INTERNAL MEDICINE

## 2019-12-06 RX ORDER — ONDANSETRON 2 MG/ML
INJECTION INTRAMUSCULAR; INTRAVENOUS PRN
Status: DISCONTINUED | OUTPATIENT
Start: 2019-12-06 | End: 2019-12-06 | Stop reason: SDUPTHER

## 2019-12-06 RX ORDER — PROPOFOL 10 MG/ML
INJECTION, EMULSION INTRAVENOUS PRN
Status: DISCONTINUED | OUTPATIENT
Start: 2019-12-06 | End: 2019-12-06 | Stop reason: SDUPTHER

## 2019-12-06 RX ORDER — DEXAMETHASONE SODIUM PHOSPHATE 4 MG/ML
INJECTION, SOLUTION INTRA-ARTICULAR; INTRALESIONAL; INTRAMUSCULAR; INTRAVENOUS; SOFT TISSUE PRN
Status: DISCONTINUED | OUTPATIENT
Start: 2019-12-06 | End: 2019-12-06 | Stop reason: SDUPTHER

## 2019-12-06 RX ORDER — SODIUM CHLORIDE 9 MG/ML
INJECTION, SOLUTION INTRAVENOUS CONTINUOUS
Status: DISCONTINUED | OUTPATIENT
Start: 2019-12-06 | End: 2019-12-06 | Stop reason: HOSPADM

## 2019-12-06 RX ORDER — LIDOCAINE HYDROCHLORIDE 20 MG/ML
INJECTION, SOLUTION INFILTRATION; PERINEURAL PRN
Status: DISCONTINUED | OUTPATIENT
Start: 2019-12-06 | End: 2019-12-06 | Stop reason: SDUPTHER

## 2019-12-06 RX ORDER — SODIUM CHLORIDE 0.9 % (FLUSH) 0.9 %
10 SYRINGE (ML) INJECTION PRN
Status: DISCONTINUED | OUTPATIENT
Start: 2019-12-06 | End: 2019-12-06 | Stop reason: HOSPADM

## 2019-12-06 RX ORDER — SODIUM CHLORIDE 0.9 % (FLUSH) 0.9 %
10 SYRINGE (ML) INJECTION EVERY 12 HOURS SCHEDULED
Status: DISCONTINUED | OUTPATIENT
Start: 2019-12-06 | End: 2019-12-06 | Stop reason: HOSPADM

## 2019-12-06 RX ORDER — FENTANYL CITRATE 50 UG/ML
INJECTION, SOLUTION INTRAMUSCULAR; INTRAVENOUS PRN
Status: DISCONTINUED | OUTPATIENT
Start: 2019-12-06 | End: 2019-12-06 | Stop reason: SDUPTHER

## 2019-12-06 RX ORDER — ROCURONIUM BROMIDE 10 MG/ML
INJECTION, SOLUTION INTRAVENOUS PRN
Status: DISCONTINUED | OUTPATIENT
Start: 2019-12-06 | End: 2019-12-06 | Stop reason: SDUPTHER

## 2019-12-06 RX ORDER — EPHEDRINE SULFATE/0.9% NACL/PF 50 MG/5 ML
SYRINGE (ML) INTRAVENOUS PRN
Status: DISCONTINUED | OUTPATIENT
Start: 2019-12-06 | End: 2019-12-06 | Stop reason: SDUPTHER

## 2019-12-06 RX ORDER — SUCCINYLCHOLINE CHLORIDE 20 MG/ML
INJECTION INTRAMUSCULAR; INTRAVENOUS PRN
Status: DISCONTINUED | OUTPATIENT
Start: 2019-12-06 | End: 2019-12-06 | Stop reason: SDUPTHER

## 2019-12-06 RX ADMIN — ONDANSETRON 4 MG: 2 INJECTION INTRAMUSCULAR; INTRAVENOUS at 12:33

## 2019-12-06 RX ADMIN — FENTANYL CITRATE 50 MCG: 50 INJECTION, SOLUTION INTRAMUSCULAR; INTRAVENOUS at 12:10

## 2019-12-06 RX ADMIN — PHENYLEPHRINE HYDROCHLORIDE 50 MCG: 10 INJECTION INTRAVENOUS at 13:43

## 2019-12-06 RX ADMIN — Medication 5 MG: at 13:10

## 2019-12-06 RX ADMIN — Medication 5 MG: at 12:59

## 2019-12-06 RX ADMIN — ROCURONIUM BROMIDE 25 MG: 10 INJECTION, SOLUTION INTRAVENOUS at 12:14

## 2019-12-06 RX ADMIN — Medication 10 MG: at 13:43

## 2019-12-06 RX ADMIN — ROCURONIUM BROMIDE 10 MG: 10 INJECTION, SOLUTION INTRAVENOUS at 12:11

## 2019-12-06 RX ADMIN — PROPOFOL 50 MG: 10 INJECTION, EMULSION INTRAVENOUS at 13:29

## 2019-12-06 RX ADMIN — DEXAMETHASONE SODIUM PHOSPHATE 4 MG: 4 INJECTION, SOLUTION INTRAMUSCULAR; INTRAVENOUS at 12:32

## 2019-12-06 RX ADMIN — PHENYLEPHRINE HYDROCHLORIDE 50 MCG: 10 INJECTION INTRAVENOUS at 13:23

## 2019-12-06 RX ADMIN — Medication 10 MG: at 12:23

## 2019-12-06 RX ADMIN — SODIUM CHLORIDE: 9 INJECTION, SOLUTION INTRAVENOUS at 11:13

## 2019-12-06 RX ADMIN — SUCCINYLCHOLINE CHLORIDE 100 MG: 20 INJECTION, SOLUTION INTRAMUSCULAR; INTRAVENOUS at 12:11

## 2019-12-06 RX ADMIN — PHENYLEPHRINE HYDROCHLORIDE 5 MCG: 10 INJECTION INTRAVENOUS at 13:00

## 2019-12-06 RX ADMIN — Medication 5 MG: at 12:42

## 2019-12-06 RX ADMIN — PHENYLEPHRINE HYDROCHLORIDE 50 MCG: 10 INJECTION INTRAVENOUS at 12:20

## 2019-12-06 RX ADMIN — PROPOFOL 150 MG: 10 INJECTION, EMULSION INTRAVENOUS at 12:11

## 2019-12-06 RX ADMIN — PHENYLEPHRINE HYDROCHLORIDE 50 MCG: 10 INJECTION INTRAVENOUS at 13:10

## 2019-12-06 RX ADMIN — PHENYLEPHRINE HYDROCHLORIDE 50 MCG: 10 INJECTION INTRAVENOUS at 12:25

## 2019-12-06 RX ADMIN — LIDOCAINE HYDROCHLORIDE 60 MG: 20 INJECTION, SOLUTION INFILTRATION; PERINEURAL at 12:11

## 2019-12-06 RX ADMIN — PHENYLEPHRINE HYDROCHLORIDE 50 MCG: 10 INJECTION INTRAVENOUS at 12:52

## 2019-12-06 RX ADMIN — Medication 10 MG: at 13:24

## 2019-12-06 RX ADMIN — PHENYLEPHRINE HYDROCHLORIDE 50 MCG: 10 INJECTION INTRAVENOUS at 12:42

## 2019-12-06 RX ADMIN — Medication 5 MG: at 12:51

## 2019-12-06 ASSESSMENT — PULMONARY FUNCTION TESTS
PIF_VALUE: 26
PIF_VALUE: 15
PIF_VALUE: 6
PIF_VALUE: 27
PIF_VALUE: 12
PIF_VALUE: 29
PIF_VALUE: 27
PIF_VALUE: 14
PIF_VALUE: 29
PIF_VALUE: 27
PIF_VALUE: 27
PIF_VALUE: 26
PIF_VALUE: 27
PIF_VALUE: 24
PIF_VALUE: 27
PIF_VALUE: 29
PIF_VALUE: 27
PIF_VALUE: 29
PIF_VALUE: 16
PIF_VALUE: 28
PIF_VALUE: 27
PIF_VALUE: 27
PIF_VALUE: 15
PIF_VALUE: 27
PIF_VALUE: 26
PIF_VALUE: 15
PIF_VALUE: 27
PIF_VALUE: 28
PIF_VALUE: 16
PIF_VALUE: 28
PIF_VALUE: 11
PIF_VALUE: 15
PIF_VALUE: 26
PIF_VALUE: 19
PIF_VALUE: 27
PIF_VALUE: 3
PIF_VALUE: 15
PIF_VALUE: 18
PIF_VALUE: 27
PIF_VALUE: 18
PIF_VALUE: 27
PIF_VALUE: 18
PIF_VALUE: 27
PIF_VALUE: 27
PIF_VALUE: 29
PIF_VALUE: 27
PIF_VALUE: 27
PIF_VALUE: 29
PIF_VALUE: 20
PIF_VALUE: 27
PIF_VALUE: 27
PIF_VALUE: 15
PIF_VALUE: 26
PIF_VALUE: 27
PIF_VALUE: 24
PIF_VALUE: 3
PIF_VALUE: 26
PIF_VALUE: 27
PIF_VALUE: 19
PIF_VALUE: 27
PIF_VALUE: 29
PIF_VALUE: 27
PIF_VALUE: 27
PIF_VALUE: 29
PIF_VALUE: 31
PIF_VALUE: 15
PIF_VALUE: 15
PIF_VALUE: 27
PIF_VALUE: 25
PIF_VALUE: 20
PIF_VALUE: 27
PIF_VALUE: 16
PIF_VALUE: 29
PIF_VALUE: 29
PIF_VALUE: 13
PIF_VALUE: 15
PIF_VALUE: 11
PIF_VALUE: 27
PIF_VALUE: 14
PIF_VALUE: 14
PIF_VALUE: 24
PIF_VALUE: 16
PIF_VALUE: 27
PIF_VALUE: 19
PIF_VALUE: 27
PIF_VALUE: 15
PIF_VALUE: 29
PIF_VALUE: 18
PIF_VALUE: 18
PIF_VALUE: 15
PIF_VALUE: 15
PIF_VALUE: 26
PIF_VALUE: 28
PIF_VALUE: 18
PIF_VALUE: 27
PIF_VALUE: 27
PIF_VALUE: 29
PIF_VALUE: 27
PIF_VALUE: 27
PIF_VALUE: 15
PIF_VALUE: 28
PIF_VALUE: 27
PIF_VALUE: 15
PIF_VALUE: 19
PIF_VALUE: 1
PIF_VALUE: 14
PIF_VALUE: 27
PIF_VALUE: 25
PIF_VALUE: 26
PIF_VALUE: 16
PIF_VALUE: 18
PIF_VALUE: 26
PIF_VALUE: 18
PIF_VALUE: 13
PIF_VALUE: 27

## 2019-12-06 NOTE — H&P
Pt seen and examined. Pt presents with incidental lung mass and mediastinal hilar adenopathy. Bronchoscopy with navigation and EBUS scheduled today. No changes in H/P, please refer to note from 11/27.

## 2019-12-06 NOTE — PROGRESS NOTES
Pt received into [facility] from [facility]. Pt drowsy, yet oriented. O2 at 10 l/simple mask. resp easy, unlabored. Vss. Report obtained. Pt denies any pain/SOB. Will monitor.

## 2019-12-06 NOTE — OP NOTE
Chief Complaint   Patient presents with    Follow-up     Still experiencing occasional headaches and nausea spells, lasting a few days at a time  Also has weight loss concerns   Anxiety     Has been experiencing anxiety attacks for 1-2 weeks    Headache    Nausea       Subjective:     Patient ID: Eusebio Matias is a 11 y o  female    Beatriz Fishman is a 14yo who was here about 1 month ago for well visit, and at that time was having episodes of dizzyness and nausea, and Mom had tracked it and noticed it was the few days prior to her period  Today, Beatriz Fishman comes in complaining that these episodes are more frequent, but the same in severity  Mom states she missed school last week for two days due to migraines, which she has a history of and Mom does as well  She was out of school Tuesday, went back Wednesday, and then Thursday had to stay home again  She has never seen a neurologist for this problem as in the past, Mom was told she needed to keep a diary of symptoms prior ot going  Takes Excedrin migraine and that helps  The dizzyness has stopped, and the nausea does happen without headache  Beatriz Fishman states this feeling comes/goes, she is not nauseated all the time  She has not vomited  She will eat meals when she has a headache  Sativa states she's also tired, but Mom states she has been sleeping more for the past 2 years and she has not noticed a difference in her sleep/behavior/routine  When she has nausea without headache, she will wake up with it and it lasts all day  Mom states Beatriz Fishman just told her today that she's been having anxiety  Sativa states anxiety started 2 weeks ago, and does not identify any change or contributing factors  Beatriz Fishman states she just "woke up that way" one day  With the anxiety, Sativa feels nauseated  She does have an appt with Counselor here in April  Beatriz Fishman states she feels like she's been eating more with anxiety   She does not eat breakfast, hamburger meat and salad for lunch, snack after Navigational Bronchoscopy/EBUS and Biopsy Procedure Note    Date of Operation: 12/6/2019    Pre-op Diagnosis: RUL lung mass    Post-op Diagnosis: same    Surgeon: Sinan Saldaña    Anesthesia: General endotracheal anesthesia    Operation: Navigational flexible fiberoptic bronchoscopy with bronchial brush sampling x 4; transbronchial biopsy x 9; bronchial lavage from RUL mass; EBUS with TBNA of subcarinal x 4, hilar LN x 1. Estimated Blood Loss: less than 50     Complications: None    Indications and History:  The patient is a 68 y.o. female with Right upper lobe lung mass and mediastinal adenopathy. The risks, benefits, complications, treatment options and expected outcomes were discussed with the patient. The possibilities of reaction to medication, pulmonary aspiration, perforation of a viscus, bleeding, failure to diagnose a condition and creating a complication requiring transfusion or operation were discussed with the patient who freely signed the consent. Description of Procedure: The patient was taken to endoscopy suite, identified as Neha Pradhan and the procedure verified as Navigational Flexible Fiberoptic Bronchoscopy and Biopsy. A Time Out was held and the above information confirmed. After the induction of topical nasopharyngeal anesthesia, the patient was placed in appropriate position and the bronchoscope was passed through the ET tube into the trachea. Careful inspection of the tracheal lumen was accomplished. The scope was sequentially passed into the left main and then left upper and lower bronchi and segmental bronchi. The scope was then withdrawn and advanced into the right main bronchus and then into the RUL, RML, and RLL bronchi and segmental bronchi.      Endobronchial findings: normal  Trachea: Normal mucosa  Tiki: Normal mucosa  Right main bronchus: Normal mucosa  Right upper lobe bronchus: Normal mucosa  Right Middle lobe bronchus: Normal mucosa  Right Lower lobe bronchus: Normal mucosa  Left main bronchus: Normal mucosa  Left upper lobe bronchus: Normal mucosa  Left lower lobe bronchus: Normal mucosa    Endobronchial biopsy:   Using EMB computer assisted navigation, the sheath was advanced into position in the RUL anterior segment and confirmed with Fluoroscopy imaging. Bronchial brush sampling x 4 produced WILLAM atypical cells/blood and bronchial cells sample. Transbronchial Biopsy x 9 was taken from the RUL mass using biopsy forceps. Bronchial lavage was performed using 10cc of saline/5cc bloody return. The EBUS scope was passed through the ET tube and the subcarinal LN was located using the ultrasound. TBNA obtained from this area x 4 passes using 19G needle. Blood and some lymphocytes noted. Also right hilar lymph node sampling obtained after identification using ultrasound. 1 pass only with blood and lymphocytes. Vascular area and hence further attempts not made. Also the precarinal lymph node was not clearly identified with ultrasound. The patient was taken to the Endoscopy Recovery area in satisfactory condition. Recommendation:    1. F/U on cytology results    Attestation: I performed the procedure.     [address] [employer] lunch (goldfish), and dinner  States "Maybe Im not eating more but I get nunn, faster " Mom thinks last she was here she had her menses, and Mom thinks that may have added a pound or two  Sativa denies diarrhea, constipation, polyuria, polydipsia, myalgias, joint pain/swelling, fevers, cold or heat intolerance, night sweats  She has not been trying to loose weight  She is not currently playing any sports  Review of Systems   Constitutional: Positive for fatigue  Negative for activity change, appetite change and fever  HENT: Negative for congestion, ear discharge, ear pain, postnasal drip, rhinorrhea and sore throat  Eyes: Negative for pain, discharge and itching  Respiratory: Negative for cough, shortness of breath, wheezing and stridor  Gastrointestinal: Negative for anal bleeding, constipation, diarrhea, nausea and vomiting  Genitourinary: Negative for decreased urine volume  Musculoskeletal: Negative for myalgias, neck pain and neck stiffness  Neurological: Positive for headaches  Negative for dizziness and facial asymmetry  Psychiatric/Behavioral: The patient is nervous/anxious  Patient Active Problem List   Diagnosis    Chronic headaches       History reviewed  No pertinent past medical history  History reviewed  No pertinent surgical history      Social History     Socioeconomic History    Marital status: Single     Spouse name: Not on file    Number of children: Not on file    Years of education: Not on file    Highest education level: Not on file   Occupational History    Not on file   Social Needs    Financial resource strain: Not on file    Food insecurity:     Worry: Not on file     Inability: Not on file    Transportation needs:     Medical: Not on file     Non-medical: Not on file   Tobacco Use    Smoking status: Never Smoker    Smokeless tobacco: Never Used   Substance and Sexual Activity    Alcohol use: Not on file    Drug use: Not on file    Sexual activity: Not on file   Lifestyle    Physical activity:     Days per week: Not on file     Minutes per session: Not on file    Stress: Not on file   Relationships    Social connections:     Talks on phone: Not on file     Gets together: Not on file     Attends Tenriism service: Not on file     Active member of club or organization: Not on file     Attends meetings of clubs or organizations: Not on file     Relationship status: Not on file    Intimate partner violence:     Fear of current or ex partner: Not on file     Emotionally abused: Not on file     Physically abused: Not on file     Forced sexual activity: Not on file   Other Topics Concern    Not on file   Social History Narrative    Not on file       Family History   Problem Relation Age of Onset    No Known Problems Mother     No Known Problems Father     Mental illness Neg Hx     Substance Abuse Neg Hx         No Known Allergies    Current Outpatient Medications on File Prior to Visit   Medication Sig Dispense Refill    ferrous sulfate 324 (65 Fe) mg Take 1 tablet (324 mg total) by mouth daily before breakfast 60 tablet 3     No current facility-administered medications on file prior to visit  The following portions of the patient's history were reviewed and updated as appropriate: allergies, current medications, past family history, past medical history, past social history, past surgical history and problem list     Objective:    Vitals:    03/20/19 1550   BP: 110/80   Temp: 98 4 °F (36 9 °C)   TempSrc: Oral   Weight: 53 9 kg (118 lb 12 8 oz)   Height: 5' 4 75" (1 645 m)       Physical Exam   Constitutional: She is oriented to person, place, and time  She appears well-developed and well-nourished  No distress  Anxious at times during discussion, tearful when asking about symptoms, but tears resolved quickly  Upset when discussing screen time    HENT:   Head: Normocephalic and atraumatic     Right Ear: Tympanic membrane, external ear and ear canal normal    Left Ear: Tympanic membrane, external ear and ear canal normal    Nose: Nose normal    Mouth/Throat: Uvula is midline, oropharynx is clear and moist and mucous membranes are normal    Eyes: Pupils are equal, round, and reactive to light  Conjunctivae are normal  Right eye exhibits no discharge  Left eye exhibits no discharge  Neck: Neck supple  No thyromegaly present  Cardiovascular: Normal rate, regular rhythm and normal heart sounds  No murmur heard  Pulmonary/Chest: Effort normal and breath sounds normal  No stridor  No respiratory distress  She has no wheezes  She has no rales  She exhibits no tenderness  Lymphadenopathy:     She has no cervical adenopathy  Neurological: She is alert and oriented to person, place, and time  No cranial nerve deficit  Skin: Skin is warm  Capillary refill takes less than 2 seconds  No rash noted  Psychiatric: She has a normal mood and affect  Her behavior is normal  Judgment and thought content normal          Assessment/Plan:    Diagnoses and all orders for this visit:    Chronic nonintractable headache, unspecified headache type  -     CBC and differential; Future  -     TSH, 3rd generation; Future  -     Comprehensive metabolic panel; Future  -     Ferritin; Future  -     Urinalysis with microscopic; Future  -     Ambulatory referral to Pediatric Neurology; Future    Anxiety  -     CBC and differential; Future  -     TSH, 3rd generation; Future  -     Comprehensive metabolic panel; Future  -     Ferritin; Future  -     Urinalysis with microscopic; Future  -     Ambulatory referral to Pediatric Neurology; Future    History of anemia  -     Cancel: POCT hemoglobin fingerstick  -     CBC and differential; Future  -     TSH, 3rd generation; Future  -     Comprehensive metabolic panel; Future  -     Ferritin; Future  -     Urinalysis with microscopic;  Future    Encounter for immunization  -     HPV VACCINE 9 VALENT IM        Long discussion with Bacilio and Mom  Discussed screen time, sleep hygeine, methods to alleviate anxiety  Discussed coming up with night time routine and AM routine to ease anxiety before and after school  Keep appointment with Therapist in 1 month  Discussed referral to neuro, to evaluate headaches and dizzyness  Discussed that at some point in future, a diet diary might be helpful as some foods may trigger migraines  Discussed importance of eating breakfast, as dizzyness and nausea could also be low blood sugar  Mom and Marco Breed to work on something she can grab for breakfast  Continue Excedrin migraine  Return in 1 month- weight check and follow up  Mom and Bacilio verbalized understanding

## 2019-12-06 NOTE — PROGRESS NOTES
Teaching / education initiated regarding perioperative experience, expectations, and pain management during stay. Patient verbalized understanding.     Electronically signed by Bing Edwards RN on 12/6/2019 at 11:08 AM

## 2019-12-11 ENCOUNTER — TELEPHONE (OUTPATIENT)
Dept: PULMONOLOGY | Age: 69
End: 2019-12-11

## 2019-12-13 ENCOUNTER — OFFICE VISIT (OUTPATIENT)
Dept: PULMONOLOGY | Age: 69
End: 2019-12-13
Payer: COMMERCIAL

## 2019-12-13 VITALS
OXYGEN SATURATION: 95 % | BODY MASS INDEX: 21.98 KG/M2 | SYSTOLIC BLOOD PRESSURE: 146 MMHG | DIASTOLIC BLOOD PRESSURE: 84 MMHG | WEIGHT: 145 LBS | RESPIRATION RATE: 18 BRPM | HEIGHT: 68 IN | HEART RATE: 92 BPM

## 2019-12-13 DIAGNOSIS — R59.0 MEDIASTINAL ADENOPATHY: ICD-10-CM

## 2019-12-13 DIAGNOSIS — C34.91 NON-SMALL CELL CANCER OF RIGHT LUNG (HCC): Primary | ICD-10-CM

## 2019-12-13 PROCEDURE — 99214 OFFICE O/P EST MOD 30 MIN: CPT | Performed by: INTERNAL MEDICINE

## 2019-12-13 ASSESSMENT — ENCOUNTER SYMPTOMS
STRIDOR: 0
BLOOD IN STOOL: 0
CHEST TIGHTNESS: 0
DIARRHEA: 0
CHOKING: 0
APNEA: 0
BACK PAIN: 0
ANAL BLEEDING: 0
ABDOMINAL DISTENTION: 0
CONSTIPATION: 0
SORE THROAT: 0
SINUS PRESSURE: 0
COUGH: 0
ABDOMINAL PAIN: 0
SHORTNESS OF BREATH: 0
RHINORRHEA: 0
VOICE CHANGE: 0
WHEEZING: 0

## 2019-12-16 ENCOUNTER — HOSPITAL ENCOUNTER (OUTPATIENT)
Dept: PULMONOLOGY | Age: 69
Discharge: HOME | End: 2019-12-16
Payer: COMMERCIAL

## 2019-12-16 VITALS — RESPIRATION RATE: 16 BRPM | HEART RATE: 86 BPM | OXYGEN SATURATION: 97 %

## 2019-12-16 DIAGNOSIS — R91.8 LUNG MASS: ICD-10-CM

## 2019-12-16 LAB
DLCO %PRED: 70 %
DLCO PRED: NORMAL
DLCO/VA %PRED: NORMAL
DLCO/VA PRED: NORMAL
DLCO/VA: NORMAL
DLCO: NORMAL
EXPIRATORY TIME-POST: NORMAL
EXPIRATORY TIME: NORMAL
FEF 25-75% %CHNG: NORMAL
FEF 25-75% %PRED-POST: NORMAL
FEF 25-75% %PRED-PRE: NORMAL
FEF 25-75% PRED: NORMAL
FEF 25-75%-POST: NORMAL
FEF 25-75%-PRE: NORMAL
FEV1 %PRED-POST: 59 %
FEV1 %PRED-PRE: 55 %
FEV1 PRED: NORMAL
FEV1-POST: NORMAL
FEV1-PRE: NORMAL
FEV1/FVC %PRED-POST: NORMAL
FEV1/FVC %PRED-PRE: NORMAL
FEV1/FVC PRED: NORMAL
FEV1/FVC-POST: 81 %
FEV1/FVC-PRE: 78 %
FVC %PRED-POST: NORMAL
FVC %PRED-PRE: NORMAL
FVC PRED: NORMAL
FVC-POST: NORMAL
FVC-PRE: NORMAL
GAW %PRED: NORMAL
GAW PRED: NORMAL
GAW: NORMAL
IC %PRED: NORMAL
IC PRED: NORMAL
IC: NORMAL
MEP: NORMAL
MIP: NORMAL
MVV %PRED-PRE: NORMAL
MVV PRED: NORMAL
MVV-PRE: NORMAL
PEF %PRED-POST: NORMAL
PEF %PRED-PRE: NORMAL
PEF PRED: NORMAL
PEF%CHNG: NORMAL
PEF-POST: NORMAL
PEF-PRE: NORMAL
RAW %PRED: NORMAL
RAW PRED: NORMAL
RAW: NORMAL
RV %PRED: NORMAL
RV PRED: NORMAL
RV: NORMAL
SVC %PRED: NORMAL
SVC PRED: NORMAL
SVC: NORMAL
TLC %PRED: 110 %
TLC PRED: NORMAL
TLC: NORMAL
VA %PRED: NORMAL
VA PRED: NORMAL
VA: NORMAL
VTG %PRED: NORMAL
VTG PRED: NORMAL
VTG: NORMAL

## 2019-12-16 PROCEDURE — 94760 N-INVAS EAR/PLS OXIMETRY 1: CPT

## 2019-12-16 PROCEDURE — 6370000000 HC RX 637 (ALT 250 FOR IP): Performed by: INTERNAL MEDICINE

## 2019-12-16 PROCEDURE — 94200 LUNG FUNCTION TEST (MBC/MVV): CPT

## 2019-12-16 PROCEDURE — 94726 PLETHYSMOGRAPHY LUNG VOLUMES: CPT

## 2019-12-16 PROCEDURE — 94060 EVALUATION OF WHEEZING: CPT

## 2019-12-16 PROCEDURE — 94729 DIFFUSING CAPACITY: CPT

## 2019-12-16 RX ORDER — ALBUTEROL SULFATE 90 UG/1
4 AEROSOL, METERED RESPIRATORY (INHALATION) ONCE
Status: COMPLETED | OUTPATIENT
Start: 2019-12-16 | End: 2019-12-16

## 2019-12-16 RX ADMIN — Medication 4 PUFF: at 14:06

## 2019-12-16 ASSESSMENT — PULMONARY FUNCTION TESTS
FEV1_PERCENT_PREDICTED_PRE: 55
FEV1_PERCENT_PREDICTED_POST: 59
FEV1/FVC_POST: 81
FEV1/FVC_PRE: 78

## 2019-12-18 ENCOUNTER — OFFICE VISIT (OUTPATIENT)
Dept: CARDIOTHORACIC SURGERY | Age: 69
End: 2019-12-18
Payer: COMMERCIAL

## 2019-12-18 VITALS
BODY MASS INDEX: 21.89 KG/M2 | RESPIRATION RATE: 14 BRPM | TEMPERATURE: 97.7 F | HEART RATE: 79 BPM | WEIGHT: 144.4 LBS | OXYGEN SATURATION: 97 % | DIASTOLIC BLOOD PRESSURE: 86 MMHG | HEIGHT: 68 IN | SYSTOLIC BLOOD PRESSURE: 148 MMHG

## 2019-12-18 DIAGNOSIS — D02.21: Primary | ICD-10-CM

## 2019-12-18 PROCEDURE — 99204 OFFICE O/P NEW MOD 45 MIN: CPT | Performed by: THORACIC SURGERY (CARDIOTHORACIC VASCULAR SURGERY)

## 2019-12-18 RX ORDER — MAGNESIUM GLUCONATE 27 MG(500)
250 TABLET ORAL DAILY PRN
Status: ON HOLD | COMMUNITY
End: 2021-05-04

## 2019-12-20 ENCOUNTER — TELEPHONE (OUTPATIENT)
Dept: CARDIOTHORACIC SURGERY | Age: 69
End: 2019-12-20

## 2019-12-20 DIAGNOSIS — R59.0 MEDIASTINAL ADENOPATHY: ICD-10-CM

## 2019-12-20 DIAGNOSIS — R91.8 MASS OF UPPER LOBE OF RIGHT LUNG: Primary | ICD-10-CM

## 2019-12-30 PROBLEM — Z01.810 PREOP CARDIOVASCULAR EXAM: Status: ACTIVE | Noted: 2019-12-30

## 2019-12-30 NOTE — PROGRESS NOTES
BRONCHOSCOPY  2019    BRONCHOSCOPY BIOPSY BRONCHUS performed by Sari Cisneros MD at [address] [place] [address]  2019    BRONCHOSCOPY ALVEOLAR LAVAGE performed by Sari Cisneros MD at [address] [place] [address]  2019    BRONCHOSCOPY BRUSHINGS performed by Sari Cisneros MD at [address] [place] Bilateral 2014. ,     HYSTERECTOMY         Social History:  Social History     Socioeconomic History    Marital status:      Spouse name: Not on file    Number of children: Not on file    Years of education: Not on file    Highest education level: Not on file   Occupational History    Occupation: accounts payable   Social Needs    Financial resource strain: Not on file    Food insecurity:     Worry: Not on file     Inability: Not on file    Transportation needs:     Medical: Not on file     Non-medical: Not on file   Tobacco Use    Smoking status: Former Smoker     Packs/day: 1.50     Years: 40.00     Pack years: 60.00     Types: Cigarettes     Last attempt to quit: 2007     Years since quittin.5    Smokeless tobacco: Never Used   Substance and Sexual Activity    Alcohol use: No     Comment: none     Drug use: No    Sexual activity: Yes     Partners: Male     Comment: , currently in relationship with long term boyfriend   Lifestyle    Physical activity:     Days per week: Not on file     Minutes per session: Not on file    Stress: Not on file   Relationships    Social connections:     Talks on phone: Not on file     Gets together: Not on file     Attends Confucianist service: Not on file     Active member of club or organization: Not on file     Attends meetings of clubs or organizations: Not on file     Relationship status: Not on file    Intimate partner violence:     Fear of current or ex partner: Not on file     Emotionally abused: Not on file     Physically abused: Not on file Forced sexual activity: Not on file   Other Topics Concern    Not on file   Social History Narrative    Not on file        Family History:  No evidence for sudden cardiac death or premature CAD. Problem Relation Age of Onset    Cancer Mother 68        bone     Arthritis Father     High Blood Pressure Father     Parkinsonism Father     Colon Cancer Sister 61       Medications:  [x] Medications and dosages reviewed. Prior to Admission medications    Medication Sig Start Date End Date Taking? Authorizing Provider   magnesium gluconate (MAGONATE) 500 MG tablet Take 250 mg by mouth daily as needed   Yes Historical Provider, MD   propranolol (INDERAL) 20 MG tablet TAKE ONE TABLET BY MOUTH THREE TIMES A DAY 9/10/19  Yes Oniel Santana MD   citalopram (CELEXA) 20 MG tablet Take 1 tablet by mouth daily 9/10/19  Yes Oniel Santana MD   atorvastatin (LIPITOR) 40 MG tablet TAKE ONE TABLET BY MOUTH DAILY  Patient taking differently: 20 mg daily TAKE ONE TABLET BY MOUTH DAILY 9/10/19  Yes Oniel Santana MD   Multiple Vitamins-Minerals (THERAPEUTIC MULTIVITAMIN-MINERALS) tablet Take 1 tablet by mouth daily   Yes Historical Provider, MD   vitamin C (ASCORBIC ACID) 500 MG tablet Take 1,000 mg by mouth daily   Yes Historical Provider, MD   Psyllium (METAMUCIL FIBER PO) Take 3 capsules by mouth daily    Yes Historical Provider, MD   Aspirin-Acetaminophen-Caffeine (EXCEDRIN PO) Take 2 tablets by mouth as needed. Yes Historical Provider, MD   Phenylephrine-Aspirin (EVITA-SELTZER PLUS SINUS PO) Take 2 tablets by mouth as needed.    Yes Historical Provider, MD       Allergies:  Nsaids     Review of Systems:    [x]Full ROS obtained and negative except as mentioned in HPI    Physical Examination:    /76 (Site: Right Upper Arm, Position: Sitting, Cuff Size: Medium Adult)   Pulse 92   Ht 5' 8\" (1.727 m)   Wt 147 lb 3.2 oz (66.8 kg)   SpO2 97%   BMI 22.38 kg/m²   Wt Readings from Last 3 [facility]. Lipids 3/2019:   HDL 65 LDL 82    Impression/Recommendations    Ms. Joseline Andrews is a 68 y.o. female patient with:    Preoperative Cardiovascular Exam   Surgery planned is cervical mediastinoscopy with potential for right upper lobectomy (Dr. Jovani Borrego)    Intermediate to high risk surgery  Patient with known hx. Of Hypertension, Hyperlipidemia, Former tobacco abuse (30+ PYH, Quit in 2007) Nonsmall cell lung carcinoma, Anxiety  Elevated cardiovascular risk , Abnormal ECG  No active heart symptoms at this time  Functional capacity is > 4 METS  May proceed with surgery if low risk nuclear myocardial perfusion. Echocardiogram ordered to follow up on history of mitral valve prolapse is not urgent. The 10-year ASCVD risk score (Harpal Brown, et al., 2013) is: 10.8%    Values used to calculate the score:      Age: 68 years      Sex: Female      Is Non- : No      Diabetic: No      Tobacco smoker: No      Systolic Blood Pressure: 212 mmHg      Is BP treated: No      HDL Cholesterol: 65 mg/dL      Total Cholesterol: 170 mg/dL        Orders Placed This Encounter   Procedures    Stress test myoview     Can change to [employer] if needed     Standing Status:   Future     Standing Expiration Date:   1/7/2021     Order Specific Question:   Reason for Exam?     Answer:   EKG abnormalities    EKG 12 lead     Order Specific Question:   Reason for Exam?     Answer:   Pre-op    Echo 2D w doppler w color complete     Standing Status:   Future     Standing Expiration Date:   1/7/2021     Order Specific Question:   Reason for exam:     Answer:   reported MVP in history     Return for Testing. Patient Instructions   Stress test  Update echocardiogram at your convenience     Thank you for allowing me to participate in the care of your patient. Please do not hesitate to call.      Henrietta Wilson D.O., [facility] [place]  Interventional Cardiology     o: [phone]  [address] [place] [address],

## 2020-01-03 ENCOUNTER — TELEPHONE (OUTPATIENT)
Dept: CARDIOLOGY CLINIC | Age: 70
End: 2020-01-03

## 2020-01-03 NOTE — TELEPHONE ENCOUNTER
LVM offering to move ov with BNN to 1/3/20 at 1245 per pt request to [address] [place] [address] office.

## 2020-01-07 ENCOUNTER — TELEPHONE (OUTPATIENT)
Dept: PULMONOLOGY | Age: 70
End: 2020-01-07

## 2020-01-07 ENCOUNTER — TELEPHONE (OUTPATIENT)
Dept: ADMINISTRATIVE | Age: 70
End: 2020-01-07

## 2020-01-07 ENCOUNTER — OFFICE VISIT (OUTPATIENT)
Dept: CARDIOLOGY CLINIC | Age: 70
End: 2020-01-07
Payer: COMMERCIAL

## 2020-01-07 VITALS
BODY MASS INDEX: 22.31 KG/M2 | WEIGHT: 147.2 LBS | HEART RATE: 92 BPM | DIASTOLIC BLOOD PRESSURE: 76 MMHG | HEIGHT: 68 IN | SYSTOLIC BLOOD PRESSURE: 134 MMHG | OXYGEN SATURATION: 97 %

## 2020-01-07 PROCEDURE — 99204 OFFICE O/P NEW MOD 45 MIN: CPT | Performed by: INTERNAL MEDICINE

## 2020-01-07 PROCEDURE — 93000 ELECTROCARDIOGRAM COMPLETE: CPT | Performed by: INTERNAL MEDICINE

## 2020-01-07 NOTE — LETTER
Talks on phone: Not on file     Gets together: Not on file     Attends Bahai service: Not on file     Active member of club or organization: Not on file     Attends meetings of clubs or organizations: Not on file     Relationship status: Not on file    Intimate partner violence:     Fear of current or ex partner: Not on file     Emotionally abused: Not on file     Physically abused: Not on file     Forced sexual activity: Not on file   Other Topics Concern    Not on file   Social History Narrative    Not on file        Family History:  No evidence for sudden cardiac death or premature CAD. Problem Relation Age of Onset    Cancer Mother 68        bone     Arthritis Father     High Blood Pressure Father     Parkinsonism Father     Colon Cancer Sister 61       Medications:  [x] Medications and dosages reviewed. Prior to Admission medications    Medication Sig Start Date End Date Taking? Authorizing Provider   magnesium gluconate (MAGONATE) 500 MG tablet Take 250 mg by mouth daily as needed   Yes Historical Provider, MD   propranolol (INDERAL) 20 MG tablet TAKE ONE TABLET BY MOUTH THREE TIMES A DAY 9/10/19  Yes Eloy Arango MD   citalopram (CELEXA) 20 MG tablet Take 1 tablet by mouth daily 9/10/19  Yes Eloy Arango MD   atorvastatin (LIPITOR) 40 MG tablet TAKE ONE TABLET BY MOUTH DAILY  Patient taking differently: 20 mg daily TAKE ONE TABLET BY MOUTH DAILY 9/10/19  Yes Eloy Arango MD   Multiple Vitamins-Minerals (THERAPEUTIC MULTIVITAMIN-MINERALS) tablet Take 1 tablet by mouth daily   Yes Historical Provider, MD   vitamin C (ASCORBIC ACID) 500 MG tablet Take 1,000 mg by mouth daily   Yes Historical Provider, MD   Psyllium (METAMUCIL FIBER PO) Take 3 capsules by mouth daily    Yes Historical Provider, MD   Aspirin-Acetaminophen-Caffeine (EXCEDRIN PO) Take 2 tablets by mouth as needed.    Yes Historical Provider, MD Phenylephrine-Aspirin (EVITA-SELTZER PLUS SINUS PO) Take 2 tablets by mouth as needed.    Yes Historical Provider, MD       Allergies:  Nsaids     Review of Systems:    [x]Full ROS obtained and negative except as mentioned in HPI    Physical Examination:    /76 (Site: Right Upper Arm, Position: Sitting, Cuff Size: Medium Adult)   Pulse 92   Ht 5' 8\" (1.727 m)   Wt 147 lb 3.2 oz (66.8 kg)   SpO2 97%   BMI 22.38 kg/m²    Wt Readings from Last 3 Encounters:   01/07/20 147 lb 3.2 oz (66.8 kg)   12/18/19 144 lb 6.4 oz (65.5 kg)   12/13/19 145 lb (65.8 kg)     Vitals:    01/07/20 1242   BP: 134/76   Pulse: 92   SpO2: 97%       · GENERAL: Well developed, well nourished, no acute distress  · NEUROLOGICAL: Alert and oriented x3  · PSYCH: Normal mood and affect   · SKIN: Warm and dry  · HEENT: Normocephalic, atraumatic, Sclera non-icteric, mucous membranes moist  · NECK: supple, JVP normal  · CARDIAC: Normal PMI, regular rate and rhythm, normal S1S2, no murmur, rub, or gallop  · RESPIRATORY: Normal respiratory effort, clear to auscultation bilaterally  · EXTREMITIES: no edema or clubbing, +2 pulses bilaterally   · MUSCULOSKELETAL: No joint swelling or tenderness, no chest wall tenderness  · GASTROINTESTINAL: normal bowel sounds, soft, non-tender    Labs:  Lab Review   Hospital Outpatient Visit on 12/16/2019   Component Date Value    FEV1 %Pred-Pre 12/16/2019 55     FEV1 %Pred-Post 12/16/2019 59     FEV1/FVC-Pre 12/16/2019 78     FEV1/FVC-Post 12/16/2019 81     DLCO %Pred 12/16/2019 70     TLC %Pred 12/16/2019 110    Admission on 12/06/2019, Discharged on 12/06/2019   Component Date Value    WBC 12/06/2019 8.2     RBC 12/06/2019 5.22*    Hemoglobin 12/06/2019 15.7     Hematocrit 12/06/2019 46.2     MCV 12/06/2019 88.4     MCH 12/06/2019 30.0     MCHC 12/06/2019 33.9     RDW 12/06/2019 13.3     Platelets 14/29/6549 275     MPV 12/06/2019 8.9     Protime 12/06/2019 11.1     INR 12/06/2019 0.96

## 2020-01-07 NOTE — TELEPHONE ENCOUNTER
Pt is concerned about all the radiation and injections she is having with all her tests. She states she has had a CT scan, PET scan, bronchoscopy,and is having a Myoview stress test on Monday with nuclear injection. She has a mammogram scheduled on Thursday and wants to know if she should have done due to all the tests she has been having. Please call pt and advise.

## 2020-01-10 NOTE — TELEPHONE ENCOUNTER
She has yet to hear from  to set up this procedure since seeing cardio and they are wanting her to have a stress  Which is Monday. Per Dr. Nguyễn Cai will go ahead and cancel appt and she will call me back middle of next week.

## 2020-01-13 ENCOUNTER — HOSPITAL ENCOUNTER (OUTPATIENT)
Dept: NON INVASIVE DIAGNOSTICS | Age: 70
Discharge: HOME | End: 2020-01-13
Payer: COMMERCIAL

## 2020-01-13 ENCOUNTER — TELEPHONE (OUTPATIENT)
Dept: CARDIOTHORACIC SURGERY | Age: 70
End: 2020-01-13

## 2020-01-13 PROCEDURE — A9502 TC99M TETROFOSMIN: HCPCS | Performed by: INTERNAL MEDICINE

## 2020-01-13 PROCEDURE — 78452 HT MUSCLE IMAGE SPECT MULT: CPT | Performed by: INTERNAL MEDICINE

## 2020-01-13 PROCEDURE — 3430000000 HC RX DIAGNOSTIC RADIOPHARMACEUTICAL: Performed by: INTERNAL MEDICINE

## 2020-01-13 PROCEDURE — 93017 CV STRESS TEST TRACING ONLY: CPT | Performed by: INTERNAL MEDICINE

## 2020-01-13 RX ADMIN — TETROFOSMIN 30 MILLICURIE: 1.38 INJECTION, POWDER, LYOPHILIZED, FOR SOLUTION INTRAVENOUS at 08:45

## 2020-01-13 RX ADMIN — TETROFOSMIN 10 MILLICURIE: 1.38 INJECTION, POWDER, LYOPHILIZED, FOR SOLUTION INTRAVENOUS at 07:27

## 2020-01-13 NOTE — PROGRESS NOTES
Patient instructed on Armando Protocol Stress Test Procedure including possible side effects and adverse reactions. Verbalizes knowledge and understanding and denies having any questions.

## 2020-01-13 NOTE — TELEPHONE ENCOUNTER
Per dr self, pt to have mediastinoscopy Fri Jan 17, outpatient. Pt aware she will receive call from scheduling, PAT.

## 2020-01-14 ENCOUNTER — ANESTHESIA EVENT (OUTPATIENT)
Dept: OPERATING ROOM | Age: 70
End: 2020-01-14
Payer: COMMERCIAL

## 2020-01-15 ENCOUNTER — HOSPITAL ENCOUNTER (OUTPATIENT)
Dept: PREADMISSION TESTING | Age: 70
Setting detail: OUTPATIENT SURGERY
Discharge: HOME | End: 2020-01-19
Payer: COMMERCIAL

## 2020-01-15 VITALS
BODY MASS INDEX: 22.28 KG/M2 | OXYGEN SATURATION: 96 % | DIASTOLIC BLOOD PRESSURE: 88 MMHG | SYSTOLIC BLOOD PRESSURE: 172 MMHG | RESPIRATION RATE: 16 BRPM | WEIGHT: 147 LBS | HEART RATE: 62 BPM | HEIGHT: 68 IN | TEMPERATURE: 97.7 F

## 2020-01-15 LAB
ABO/RH: NORMAL
ANION GAP SERPL CALCULATED.3IONS-SCNC: 12 MMOL/L (ref 3–16)
ANTIBODY SCREEN: NORMAL
APTT: 34.1 SEC (ref 24.2–36.2)
BASOPHILS ABSOLUTE: 0.1 K/UL (ref 0–0.2)
BASOPHILS RELATIVE PERCENT: 1.3 %
BILIRUBIN URINE: NEGATIVE
BLOOD, URINE: NEGATIVE
BUN BLDV-MCNC: 14 MG/DL (ref 7–20)
CALCIUM SERPL-MCNC: 9.9 MG/DL (ref 8.3–10.6)
CHLORIDE BLD-SCNC: 103 MMOL/L (ref 99–110)
CLARITY: CLEAR
CO2: 26 MMOL/L (ref 21–32)
COLOR: YELLOW
CREAT SERPL-MCNC: 0.7 MG/DL (ref 0.6–1.2)
EOSINOPHILS ABSOLUTE: 0.1 K/UL (ref 0–0.6)
EOSINOPHILS RELATIVE PERCENT: 2.1 %
GFR AFRICAN AMERICAN: >60
GFR NON-AFRICAN AMERICAN: >60
GLUCOSE BLD-MCNC: 100 MG/DL (ref 70–99)
GLUCOSE URINE: NEGATIVE MG/DL
HCT VFR BLD CALC: 44.3 % (ref 36–48)
HEMOGLOBIN: 14.7 G/DL (ref 12–16)
INR BLD: 0.98 (ref 0.86–1.14)
KETONES, URINE: NEGATIVE MG/DL
LEUKOCYTE ESTERASE, URINE: NEGATIVE
LYMPHOCYTES ABSOLUTE: 1.2 K/UL (ref 1–5.1)
LYMPHOCYTES RELATIVE PERCENT: 20.1 %
MCH RBC QN AUTO: 29.5 PG (ref 26–34)
MCHC RBC AUTO-ENTMCNC: 33.2 G/DL (ref 31–36)
MCV RBC AUTO: 89 FL (ref 80–100)
MICROSCOPIC EXAMINATION: NORMAL
MONOCYTES ABSOLUTE: 0.4 K/UL (ref 0–1.3)
MONOCYTES RELATIVE PERCENT: 6.7 %
NEUTROPHILS ABSOLUTE: 4.3 K/UL (ref 1.7–7.7)
NEUTROPHILS RELATIVE PERCENT: 69.8 %
NITRITE, URINE: NEGATIVE
PDW BLD-RTO: 13.2 % (ref 12.4–15.4)
PH UA: 7 (ref 5–8)
PLATELET # BLD: 248 K/UL (ref 135–450)
PMV BLD AUTO: 9.3 FL (ref 5–10.5)
POTASSIUM SERPL-SCNC: 4.5 MMOL/L (ref 3.5–5.1)
PROTEIN UA: NEGATIVE MG/DL
PROTHROMBIN TIME: 11.4 SEC (ref 10–13.2)
RBC # BLD: 4.98 M/UL (ref 4–5.2)
SODIUM BLD-SCNC: 141 MMOL/L (ref 136–145)
SPECIFIC GRAVITY UA: 1 (ref 1–1.03)
URINE REFLEX TO CULTURE: NORMAL
URINE TYPE: NORMAL
UROBILINOGEN, URINE: 0.2 E.U./DL
WBC # BLD: 6.2 K/UL (ref 4–11)

## 2020-01-15 PROCEDURE — 85730 THROMBOPLASTIN TIME PARTIAL: CPT

## 2020-01-15 PROCEDURE — 85610 PROTHROMBIN TIME: CPT

## 2020-01-15 PROCEDURE — 36415 COLL VENOUS BLD VENIPUNCTURE: CPT

## 2020-01-15 PROCEDURE — 80048 BASIC METABOLIC PNL TOTAL CA: CPT

## 2020-01-15 PROCEDURE — 81003 URINALYSIS AUTO W/O SCOPE: CPT

## 2020-01-15 PROCEDURE — 86901 BLOOD TYPING SEROLOGIC RH(D): CPT

## 2020-01-15 PROCEDURE — 85025 COMPLETE CBC W/AUTO DIFF WBC: CPT

## 2020-01-15 PROCEDURE — 86850 RBC ANTIBODY SCREEN: CPT

## 2020-01-15 PROCEDURE — 86900 BLOOD TYPING SEROLOGIC ABO: CPT

## 2020-01-15 ASSESSMENT — COPD QUESTIONNAIRES: CAT_SEVERITY: MODERATE

## 2020-01-15 NOTE — PRE-PROCEDURE INSTRUCTIONS
1.  EAA Introduced self to pt and family. 2.  EAA Informed about what to expect the day of surgery. A)  Preop nurse will bring them to pre-op holdingEAA. B)  PCA will bathe & shave them. EAA       C)  Anesthesiologist will put IV lines in while in preop. sedation for comfort,             during lining, doze off. EAA       D)  Nursing staff will be in to visit. EAA       E)  Operating room will be very cold. Warming blanket under and on themEAA        F)   In the operating room there will be several people connecting them to               monitors. EAA          3. EAA Will wake up in the CVU. 4.   EAA Invite them to ask questions.

## 2020-01-15 NOTE — ANESTHESIA PRE PROCEDURE
tablet TAKE ONE TABLET BY MOUTH DAILY (Patient taking differently: 20 mg daily TAKE ONE TABLET BY MOUTH DAILY) 90 tablet 3    Multiple Vitamins-Minerals (THERAPEUTIC MULTIVITAMIN-MINERALS) tablet Take 1 tablet by mouth daily      vitamin C (ASCORBIC ACID) 500 MG tablet Take 1,000 mg by mouth daily      Psyllium (METAMUCIL FIBER PO) Take 3 capsules by mouth daily       Aspirin-Acetaminophen-Caffeine (EXCEDRIN PO) Take 2 tablets by mouth as needed.  Phenylephrine-Aspirin (EVITA-SELTZER PLUS SINUS PO) Take 2 tablets by mouth as needed. Allergies:     Allergies   Allergen Reactions    Nsaids      Causes itching and swelling       Problem List:    Patient Active Problem List   Diagnosis Code    Essential hypertension, benign I10    Hyperlipidemia E78.5    Chronic headaches R51    Anxiety F41.9    Depression F32.9    Mediastinal adenopathy R59.0    Mass of upper lobe of right lung R91.8    Preop cardiovascular exam Z01.810       Past Medical History:        Diagnosis Date    Depression     Hypertension     MVP (mitral valve prolapse)     does not follow with cardiology       Past Surgical History:        Procedure Laterality Date    BREAST BIOPSY  1966, 1988    BRONCHOSCOPY N/A 12/6/2019    BRONCHOSCOPY ELECTROMAGNETIC performed by Luana Griffin MD at [address] [place] [address] N/A 12/6/2019    BRONCHOSCOPY W/EBUS FNA performed by Luana Griffin MD at [address] [place] [address]  12/6/2019    BRONCHOSCOPY BIOPSY BRONCHUS performed by Luana Griffin MD at [address] [place] [address]  12/6/2019    BRONCHOSCOPY ALVEOLAR LAVAGE performed by Luana Griffin MD at [address] [place] [address]  12/6/2019    BRONCHOSCOPY BRUSHINGS performed by Luana Griffin MD at [facility] [place] [address] [place] Bilateral 2014. 2009, 1's   [facility]       Social History:    Social History     Tobacco Use    [facility]    Anesthesia Evaluation  Patient summary reviewed and Nursing notes reviewed  Airway: Mallampati: II  TM distance: >3 FB   Neck ROM: full  Mouth opening: > = 3 FB Dental:          Pulmonary:   (+) COPD (QUIT SMOKING 25 Y): moderate,                            ROS comment: PFT   FEV1 %Pred-Pre 12/16/2019 55    FEV1 %Pred-Post 12/16/2019 59    FEV1/FVC-Pre 12/16/2019 78    FEV1/FVC-Post 12/16/2019 81    DLCO %Pred 12/16/2019 70    TLC %Pred 12/16/2019 110     LUNG SMALL CELL CA   Cardiovascular:  Exercise tolerance: good (>4 METS),   (+) hypertension: moderate, valvular problems/murmurs: MVP,                   Neuro/Psych:   (+) headaches: migraine headaches, psychiatric history: stable with treatmentdepression/anxiety             GI/Hepatic/Renal:             Endo/Other:                     Abdominal:           Vascular:                                        Anesthesia Plan      general     ASA 3       Induction: intravenous. arterial line  MIPS: Postoperative opioids intended, Prophylactic antiemetics administered and Postoperative trial extubation. Anesthetic plan and risks discussed with patient.                       Bing Sandhu MD   1/15/2020

## 2020-01-15 NOTE — PROGRESS NOTES
Pt here for PAT visit. Consents for procedure and blood signed by pt and in chart. Labs drawn and urine collected and sent to lab for testing as ordered. Pt seen by Dr. Ferny Parisi from Anesthesia and questions answered. Vitals stable and documented in flowsheet. Pt instructed to be NPO after midnight prior to procedure and states understanding. Pt provided with hibiclens and instructed to shower with entire bottle the night prior to procedure. Pt instructed to take the following medications the morning of procedure with a small sip of water: Propranolol. Pt instructed to stop taking Multivitamin prior to procedure on 1/16/2020 and states understanding. EKG results in chart. Pt instructed to arrive to the hospital the morning of procedure at 0530 on 1/17/2020. At completion of PAT pt taken to lobby. Pt in good condition and okay for discharge after completion of x-ray. Pt denies further questions at time of discharge. Pt instructed to call Dr. Ayan Alvarenga' office with any medical concerns or the heart office at [phone] with any further questions between now and day of procedure.

## 2020-01-17 ENCOUNTER — HOSPITAL ENCOUNTER (OUTPATIENT)
Age: 70
Setting detail: OUTPATIENT SURGERY
Discharge: HOME | End: 2020-01-17
Attending: THORACIC SURGERY (CARDIOTHORACIC VASCULAR SURGERY) | Admitting: THORACIC SURGERY (CARDIOTHORACIC VASCULAR SURGERY)
Payer: COMMERCIAL

## 2020-01-17 ENCOUNTER — APPOINTMENT (OUTPATIENT)
Dept: GENERAL RADIOLOGY | Age: 70
End: 2020-01-17
Attending: THORACIC SURGERY (CARDIOTHORACIC VASCULAR SURGERY)
Payer: COMMERCIAL

## 2020-01-17 ENCOUNTER — ANESTHESIA (OUTPATIENT)
Dept: OPERATING ROOM | Age: 70
End: 2020-01-17
Payer: COMMERCIAL

## 2020-01-17 ENCOUNTER — TELEPHONE (OUTPATIENT)
Dept: CARDIOTHORACIC SURGERY | Age: 70
End: 2020-01-17

## 2020-01-17 VITALS
TEMPERATURE: 97.2 F | WEIGHT: 142.3 LBS | HEIGHT: 68 IN | OXYGEN SATURATION: 96 % | HEART RATE: 96 BPM | BODY MASS INDEX: 21.57 KG/M2 | RESPIRATION RATE: 20 BRPM | DIASTOLIC BLOOD PRESSURE: 81 MMHG | SYSTOLIC BLOOD PRESSURE: 138 MMHG

## 2020-01-17 VITALS — OXYGEN SATURATION: 98 % | RESPIRATION RATE: 2 BRPM

## 2020-01-17 PROCEDURE — 39402 MEDIASTINOSCPY W/LMPH NOD BX: CPT | Performed by: THORACIC SURGERY (CARDIOTHORACIC VASCULAR SURGERY)

## 2020-01-17 PROCEDURE — 6360000002 HC RX W HCPCS: Performed by: THORACIC SURGERY (CARDIOTHORACIC VASCULAR SURGERY)

## 2020-01-17 PROCEDURE — 3600000014 HC SURGERY LEVEL 4 ADDTL 15MIN: Performed by: THORACIC SURGERY (CARDIOTHORACIC VASCULAR SURGERY)

## 2020-01-17 PROCEDURE — 7100000001 HC PACU RECOVERY - ADDTL 15 MIN: Performed by: THORACIC SURGERY (CARDIOTHORACIC VASCULAR SURGERY)

## 2020-01-17 PROCEDURE — 6370000000 HC RX 637 (ALT 250 FOR IP): Performed by: THORACIC SURGERY (CARDIOTHORACIC VASCULAR SURGERY)

## 2020-01-17 PROCEDURE — 2500000003 HC RX 250 WO HCPCS: Performed by: THORACIC SURGERY (CARDIOTHORACIC VASCULAR SURGERY)

## 2020-01-17 PROCEDURE — 6370000000 HC RX 637 (ALT 250 FOR IP)

## 2020-01-17 PROCEDURE — 7100000011 HC PHASE II RECOVERY - ADDTL 15 MIN: Performed by: THORACIC SURGERY (CARDIOTHORACIC VASCULAR SURGERY)

## 2020-01-17 PROCEDURE — 3700000001 HC ADD 15 MINUTES (ANESTHESIA): Performed by: THORACIC SURGERY (CARDIOTHORACIC VASCULAR SURGERY)

## 2020-01-17 PROCEDURE — 7100000010 HC PHASE II RECOVERY - FIRST 15 MIN: Performed by: THORACIC SURGERY (CARDIOTHORACIC VASCULAR SURGERY)

## 2020-01-17 PROCEDURE — 7100000000 HC PACU RECOVERY - FIRST 15 MIN: Performed by: THORACIC SURGERY (CARDIOTHORACIC VASCULAR SURGERY)

## 2020-01-17 PROCEDURE — 3700000000 HC ANESTHESIA ATTENDED CARE: Performed by: THORACIC SURGERY (CARDIOTHORACIC VASCULAR SURGERY)

## 2020-01-17 PROCEDURE — 88305 TISSUE EXAM BY PATHOLOGIST: CPT

## 2020-01-17 PROCEDURE — 88342 IMHCHEM/IMCYTCHM 1ST ANTB: CPT

## 2020-01-17 PROCEDURE — 2580000003 HC RX 258: Performed by: THORACIC SURGERY (CARDIOTHORACIC VASCULAR SURGERY)

## 2020-01-17 PROCEDURE — 2709999900 HC NON-CHARGEABLE SUPPLY: Performed by: THORACIC SURGERY (CARDIOTHORACIC VASCULAR SURGERY)

## 2020-01-17 PROCEDURE — 88312 SPECIAL STAINS GROUP 1: CPT

## 2020-01-17 PROCEDURE — 2500000003 HC RX 250 WO HCPCS: Performed by: ANESTHESIOLOGY

## 2020-01-17 PROCEDURE — 88332 PATH CONSLTJ SURG EA ADD BLK: CPT

## 2020-01-17 PROCEDURE — 6370000000 HC RX 637 (ALT 250 FOR IP): Performed by: ANESTHESIOLOGY

## 2020-01-17 PROCEDURE — 88331 PATH CONSLTJ SURG 1 BLK 1SPC: CPT

## 2020-01-17 PROCEDURE — 6360000002 HC RX W HCPCS: Performed by: ANESTHESIOLOGY

## 2020-01-17 PROCEDURE — 71045 X-RAY EXAM CHEST 1 VIEW: CPT

## 2020-01-17 PROCEDURE — 3600000004 HC SURGERY LEVEL 4 BASE: Performed by: THORACIC SURGERY (CARDIOTHORACIC VASCULAR SURGERY)

## 2020-01-17 PROCEDURE — 88341 IMHCHEM/IMCYTCHM EA ADD ANTB: CPT

## 2020-01-17 RX ORDER — OXYCODONE HYDROCHLORIDE 5 MG/1
5 TABLET ORAL EVERY 4 HOURS PRN
Status: DISCONTINUED | OUTPATIENT
Start: 2020-01-17 | End: 2020-01-17 | Stop reason: HOSPADM

## 2020-01-17 RX ORDER — VECURONIUM BROMIDE 1 MG/ML
INJECTION, POWDER, LYOPHILIZED, FOR SOLUTION INTRAVENOUS PRN
Status: DISCONTINUED | OUTPATIENT
Start: 2020-01-17 | End: 2020-01-17 | Stop reason: SDUPTHER

## 2020-01-17 RX ORDER — BUPIVACAINE HYDROCHLORIDE 2.5 MG/ML
INJECTION, SOLUTION EPIDURAL; INFILTRATION; INTRACAUDAL
Status: COMPLETED | OUTPATIENT
Start: 2020-01-17 | End: 2020-01-17

## 2020-01-17 RX ORDER — SODIUM CHLORIDE 0.9 % (FLUSH) 0.9 %
10 SYRINGE (ML) INJECTION EVERY 12 HOURS SCHEDULED
Status: DISCONTINUED | OUTPATIENT
Start: 2020-01-17 | End: 2020-01-17 | Stop reason: HOSPADM

## 2020-01-17 RX ORDER — MAGNESIUM HYDROXIDE 1200 MG/15ML
LIQUID ORAL
Status: COMPLETED | OUTPATIENT
Start: 2020-01-17 | End: 2020-01-17

## 2020-01-17 RX ORDER — ONDANSETRON 2 MG/ML
4 INJECTION INTRAMUSCULAR; INTRAVENOUS EVERY 6 HOURS PRN
Status: DISCONTINUED | OUTPATIENT
Start: 2020-01-17 | End: 2020-01-17 | Stop reason: HOSPADM

## 2020-01-17 RX ORDER — SODIUM CHLORIDE, SODIUM LACTATE, POTASSIUM CHLORIDE, CALCIUM CHLORIDE 600; 310; 30; 20 MG/100ML; MG/100ML; MG/100ML; MG/100ML
INJECTION, SOLUTION INTRAVENOUS ONCE
Status: COMPLETED | OUTPATIENT
Start: 2020-01-17 | End: 2020-01-17

## 2020-01-17 RX ORDER — PROPOFOL 10 MG/ML
INJECTION, EMULSION INTRAVENOUS PRN
Status: DISCONTINUED | OUTPATIENT
Start: 2020-01-17 | End: 2020-01-17 | Stop reason: SDUPTHER

## 2020-01-17 RX ORDER — ACETAMINOPHEN 500 MG
1000 TABLET ORAL EVERY 6 HOURS
Status: DISCONTINUED | OUTPATIENT
Start: 2020-01-17 | End: 2020-01-17 | Stop reason: HOSPADM

## 2020-01-17 RX ORDER — OXYCODONE HYDROCHLORIDE 5 MG/1
10 TABLET ORAL EVERY 4 HOURS PRN
Status: DISCONTINUED | OUTPATIENT
Start: 2020-01-17 | End: 2020-01-17 | Stop reason: HOSPADM

## 2020-01-17 RX ORDER — DEXTROSE, SODIUM CHLORIDE, AND POTASSIUM CHLORIDE 5; .45; .15 G/100ML; G/100ML; G/100ML
1000 INJECTION INTRAVENOUS CONTINUOUS
Status: DISCONTINUED | OUTPATIENT
Start: 2020-01-17 | End: 2020-01-17 | Stop reason: HOSPADM

## 2020-01-17 RX ORDER — DOCUSATE SODIUM 100 MG/1
100 CAPSULE, LIQUID FILLED ORAL 2 TIMES DAILY
Status: DISCONTINUED | OUTPATIENT
Start: 2020-01-17 | End: 2020-01-17 | Stop reason: HOSPADM

## 2020-01-17 RX ORDER — TRAMADOL HYDROCHLORIDE 50 MG/1
50 TABLET ORAL EVERY 4 HOURS PRN
Qty: 28 TABLET | Refills: 0 | Status: SHIPPED | OUTPATIENT
Start: 2020-01-17 | End: 2020-01-24

## 2020-01-17 RX ORDER — SUCCINYLCHOLINE/SOD CL,ISO/PF 100 MG/5ML
SYRINGE (ML) INTRAVENOUS PRN
Status: DISCONTINUED | OUTPATIENT
Start: 2020-01-17 | End: 2020-01-17 | Stop reason: SDUPTHER

## 2020-01-17 RX ORDER — FENTANYL CITRATE 50 UG/ML
INJECTION, SOLUTION INTRAMUSCULAR; INTRAVENOUS PRN
Status: DISCONTINUED | OUTPATIENT
Start: 2020-01-17 | End: 2020-01-17 | Stop reason: SDUPTHER

## 2020-01-17 RX ORDER — SODIUM CHLORIDE 0.9 % (FLUSH) 0.9 %
10 SYRINGE (ML) INJECTION PRN
Status: DISCONTINUED | OUTPATIENT
Start: 2020-01-17 | End: 2020-01-17 | Stop reason: HOSPADM

## 2020-01-17 RX ORDER — ACETAMINOPHEN, ASPIRIN AND CAFFEINE 250; 250; 65 MG/1; MG/1; MG/1
2 TABLET, FILM COATED ORAL ONCE
Status: COMPLETED | OUTPATIENT
Start: 2020-01-17 | End: 2020-01-17

## 2020-01-17 RX ADMIN — PROPOFOL 150 MG: 10 INJECTION, EMULSION INTRAVENOUS at 07:37

## 2020-01-17 RX ADMIN — VECURONIUM BROMIDE 5 MG: 1 INJECTION, POWDER, LYOPHILIZED, FOR SOLUTION INTRAVENOUS at 08:14

## 2020-01-17 RX ADMIN — ACETAMINOPHEN, ASPIRIN AND CAFFEINE 2 TABLET: 250; 250; 65 TABLET, FILM COATED ORAL at 11:54

## 2020-01-17 RX ADMIN — Medication 200 MG: at 07:37

## 2020-01-17 RX ADMIN — SUGAMMADEX 200 MG: 100 INJECTION, SOLUTION INTRAVENOUS at 10:31

## 2020-01-17 RX ADMIN — DEXTROSE MONOHYDRATE 2 G: 50 INJECTION, SOLUTION INTRAVENOUS at 07:34

## 2020-01-17 RX ADMIN — FENTANYL CITRATE 100 MCG: 50 INJECTION, SOLUTION INTRAMUSCULAR; INTRAVENOUS at 07:37

## 2020-01-17 RX ADMIN — SODIUM CHLORIDE, POTASSIUM CHLORIDE, SODIUM LACTATE AND CALCIUM CHLORIDE: 600; 310; 30; 20 INJECTION, SOLUTION INTRAVENOUS at 07:45

## 2020-01-17 ASSESSMENT — PULMONARY FUNCTION TESTS
PIF_VALUE: 18
PIF_VALUE: 15
PIF_VALUE: 18
PIF_VALUE: 32
PIF_VALUE: 17
PIF_VALUE: 14
PIF_VALUE: 2
PIF_VALUE: 0
PIF_VALUE: 15
PIF_VALUE: 0
PIF_VALUE: 13
PIF_VALUE: 15
PIF_VALUE: 15
PIF_VALUE: 17
PIF_VALUE: 15
PIF_VALUE: 15
PIF_VALUE: 14
PIF_VALUE: 16
PIF_VALUE: 24
PIF_VALUE: 17
PIF_VALUE: 18
PIF_VALUE: 14
PIF_VALUE: 18
PIF_VALUE: 14
PIF_VALUE: 18
PIF_VALUE: 16
PIF_VALUE: 4
PIF_VALUE: 17
PIF_VALUE: 18
PIF_VALUE: 18
PIF_VALUE: 14
PIF_VALUE: 14
PIF_VALUE: 3
PIF_VALUE: 14
PIF_VALUE: 4
PIF_VALUE: 22
PIF_VALUE: 15
PIF_VALUE: 17
PIF_VALUE: 15
PIF_VALUE: 18
PIF_VALUE: 16
PIF_VALUE: 13
PIF_VALUE: 3
PIF_VALUE: 15
PIF_VALUE: 18
PIF_VALUE: 14
PIF_VALUE: 3
PIF_VALUE: 14
PIF_VALUE: 4
PIF_VALUE: 14
PIF_VALUE: 15
PIF_VALUE: 14
PIF_VALUE: 18
PIF_VALUE: 14
PIF_VALUE: 17
PIF_VALUE: 15
PIF_VALUE: 20
PIF_VALUE: 4
PIF_VALUE: 14
PIF_VALUE: 15
PIF_VALUE: 18
PIF_VALUE: 18
PIF_VALUE: 21
PIF_VALUE: 4
PIF_VALUE: 15
PIF_VALUE: 0
PIF_VALUE: 1
PIF_VALUE: 18
PIF_VALUE: 15
PIF_VALUE: 14
PIF_VALUE: 17
PIF_VALUE: 15
PIF_VALUE: 14
PIF_VALUE: 15
PIF_VALUE: 15
PIF_VALUE: 18
PIF_VALUE: 4
PIF_VALUE: 18
PIF_VALUE: 15
PIF_VALUE: 14
PIF_VALUE: 15
PIF_VALUE: 16
PIF_VALUE: 18
PIF_VALUE: 2
PIF_VALUE: 14
PIF_VALUE: 13
PIF_VALUE: 13
PIF_VALUE: 14
PIF_VALUE: 15
PIF_VALUE: 14
PIF_VALUE: 16
PIF_VALUE: 18
PIF_VALUE: 15
PIF_VALUE: 2
PIF_VALUE: 4
PIF_VALUE: 15
PIF_VALUE: 15
PIF_VALUE: 2
PIF_VALUE: 14
PIF_VALUE: 15
PIF_VALUE: 15
PIF_VALUE: 35
PIF_VALUE: 15
PIF_VALUE: 15
PIF_VALUE: 18
PIF_VALUE: 4
PIF_VALUE: 18
PIF_VALUE: 18
PIF_VALUE: 15
PIF_VALUE: 18
PIF_VALUE: 14
PIF_VALUE: 15
PIF_VALUE: 18
PIF_VALUE: 18
PIF_VALUE: 15
PIF_VALUE: 14
PIF_VALUE: 17
PIF_VALUE: 15
PIF_VALUE: 17
PIF_VALUE: 18
PIF_VALUE: 13
PIF_VALUE: 3
PIF_VALUE: 15
PIF_VALUE: 14
PIF_VALUE: 2
PIF_VALUE: 14
PIF_VALUE: 16
PIF_VALUE: 14
PIF_VALUE: 16
PIF_VALUE: 18
PIF_VALUE: 16
PIF_VALUE: 2
PIF_VALUE: 18
PIF_VALUE: 15
PIF_VALUE: 14
PIF_VALUE: 2
PIF_VALUE: 3
PIF_VALUE: 3
PIF_VALUE: 13
PIF_VALUE: 17
PIF_VALUE: 15
PIF_VALUE: 14
PIF_VALUE: 16
PIF_VALUE: 15
PIF_VALUE: 15
PIF_VALUE: 13
PIF_VALUE: 3
PIF_VALUE: 18
PIF_VALUE: 19
PIF_VALUE: 4
PIF_VALUE: 18
PIF_VALUE: 16
PIF_VALUE: 3
PIF_VALUE: 3
PIF_VALUE: 18
PIF_VALUE: 13
PIF_VALUE: 15
PIF_VALUE: 17
PIF_VALUE: 13
PIF_VALUE: 19
PIF_VALUE: 16
PIF_VALUE: 3
PIF_VALUE: 18
PIF_VALUE: 15
PIF_VALUE: 16
PIF_VALUE: 3
PIF_VALUE: 1
PIF_VALUE: 15
PIF_VALUE: 3
PIF_VALUE: 17
PIF_VALUE: 1
PIF_VALUE: 14
PIF_VALUE: 2
PIF_VALUE: 2
PIF_VALUE: 15
PIF_VALUE: 3
PIF_VALUE: 14
PIF_VALUE: 17
PIF_VALUE: 4
PIF_VALUE: 18
PIF_VALUE: 15
PIF_VALUE: 14
PIF_VALUE: 16
PIF_VALUE: 15
PIF_VALUE: 15
PIF_VALUE: 16
PIF_VALUE: 0
PIF_VALUE: 18
PIF_VALUE: 14

## 2020-01-17 ASSESSMENT — COPD QUESTIONNAIRES: CAT_SEVERITY: MODERATE

## 2020-01-17 NOTE — OP NOTE
Operative report      Pre-op Dx:   #1 right upper lobe non-small cell cancer  #2 mediastinal adenopathy    Post-op Dx:  #1 right upper lobe non-small cell cancer  #2 mediastinal adenopathy    Procedure:  Cervical video-assisted mediastinoscopy with mediastinal lymph node biopsies    Anesthesia:General endotracheal anesthesia      Surgeon: Silver Draper MD    Assistants: Estevan Hoang who assisted during the case      Specimens: Mediastinal Lymph nodes, stations 2R, 4R, & 7    EBL: Less than 50 cc    Complications: None       Findings: Normal anthracotic appearing mediastinal lymph nodes    Indication for procedure:   Patient is a 64-year-old female with recently diagnosed right upper lobe non-small cell carcinoma who on PET CT was found to have mediastinal lymphadenopathy with mild FDG uptake. Some of these mediastinal nodes were not accessible via EBUS and in order to complete her staging mediastinoscopy was recommended to rule out N2 disease. After completion of appropriate work-up the patient was found to be an appropriate candidate for an elective cervical video-assisted mediastinoscopy and lymph node biopsies    Description of procedure:  After informed consent was obtained and placement of appropriate monitoring lines the patient was brought in the operating room and was placed on the OR table in supine position. General endotracheal anesthesia was induced and a Wilkerson catheter was inserted. The patient was prepped and draped in the usual sterile sterile fashion from chin to the umbilicus and from nipple to nipple. A 4 cm transverse incision was made 1 fingerbreadth above the sternal notch and this was taken down through the platysma and through the strap muscles to expose the to the pretracheal space. Finger blunt dissection was used initially and the video mediastinoscope was then inserted in the mediastinal space.   Stations 7L4F and 7 lymph nodes were biopsied and appeared to be normal anthracotic nodes. Specimens were sent for frozen sections. All 4 specimens from different stations came back negative for malignancy with some evidence of possible lymphoid hyperplasia. Hemostasis was achieved thoroughly. The video mediastinoscope was withdrawn and the surgical wound was then closed in layers including the strap muscles, the platysma and the skin. The wound was also infiltrated with quarter percent Marcaine for local anesthesia. All needle, sponges and instrument counts were found to be correct. The patient was extubated and was put in the PACU in stable condition.

## 2020-01-17 NOTE — H&P
Previous H+P on chart   67 y/o female with RUL biopsy proven non small cell Carcinoma  Mediastinal lymphadenopathy on recent PET CT   In need for mediastinal lymph node biopsy of PET positive pre carinal lymph node for staging  No changes    Past Medical History:  Past Medical History:   Diagnosis Date    Depression     Hypertension     MVP (mitral valve prolapse)     does not follow with cardiology       Past Surgical History:  Past Surgical History:   Procedure Laterality Date    BREAST BIOPSY  1966, 1988    BRONCHOSCOPY N/A 12/6/2019    BRONCHOSCOPY ELECTROMAGNETIC performed by Katie Noriega MD at [address] [place] [address] N/A 12/6/2019    BRONCHOSCOPY W/EBUS FNA performed by Katie Noriega MD at [address] [place] [address]  12/6/2019    BRONCHOSCOPY BIOPSY BRONCHUS performed by Katie Noriega MD at [address] [place] [address]  12/6/2019    BRONCHOSCOPY ALVEOLAR LAVAGE performed by Katie Noriega MD at [address] [place] [address]  12/6/2019    BRONCHOSCOPY BRUSHINGS performed by Katie Noriega MD [address] Bilateral 2014. 2009, 1990's    HYSTERECTOMY  1987       Home Medications:   Prior to Admission medications    Medication Sig Start Date End Date Taking?  Authorizing Provider   magnesium gluconate (MAGONATE) 500 MG tablet Take 250 mg by mouth daily as needed   Yes Historical Provider, MD   propranolol (INDERAL) 20 MG tablet TAKE ONE TABLET BY MOUTH THREE TIMES A DAY 9/10/19  Yes Arnaldo Parikh MD   citalopram (CELEXA) 20 MG tablet Take 1 tablet by mouth daily 9/10/19  Yes Arnaldo Parikh MD   atorvastatin (LIPITOR) 40 MG tablet TAKE ONE TABLET BY MOUTH DAILY  Patient taking differently: 20 mg daily TAKE ONE TABLET BY MOUTH DAILY 9/10/19  Yes Arnaldo Parikh MD   vitamin C (ASCORBIC ACID) 500 MG tablet Take 1,000 mg by mouth daily   Yes Historical 01/15/2020    PHUR 7.0 01/15/2020    CLARITYU Clear 01/15/2020    SPECGRAV 1.005 01/15/2020    LEUKOCYTESUR Negative 01/15/2020    UROBILINOGEN 0.2 01/15/2020    BILIRUBINUR Negative 01/15/2020    BLOODU Negative 01/15/2020    GLUCOSEU Negative 01/15/2020       Reviewed above, reason for surgery, diagnosis and treatment plan.   Will proceed with cervical mediastinoscopy and precarinal lymph node biposies     Juan Antonio Draper MD  1/17/2020  7:58 AM

## 2020-01-17 NOTE — PROGRESS NOTES
CLINICAL PHARMACY NOTE: MEDS TO [address] [place], [place] [address] Select Patient?: No  Total # of Prescriptions Filled: 1   The following medications were delivered to the patient:  · Tramadol 50mg  Total # of Interventions Completed: 0  Time Spent (min): 30    Additional Documentation:  Medication delivered- patient signed  Jeffrey Mantilla

## 2020-01-17 NOTE — BRIEF OP NOTE
Brief Postoperative Note  ______________________________________________________________    Patient: Neha Bonilla  YOB: 1950  MRN: [id number]  Date of Procedure: 1/17/2020    Pre-Op Diagnosis: RIGHT UPPER LOBE NSCI CARCINOMA, Mediastinal Lymphadenopathy    Post-Op Diagnosis: Same       Procedure(s):  CERVICAL VIDEO ASSISTED MEDIASTINOSCOPY WITH LYMPH NODE BIOPSIES WITH FROZEN SECTION. Anesthesia: General    Surgeon(s):  Juan Antonio Draper MD    Assistant: Guy Butt    Estimated Blood Loss (mL): less than 50     Complications: None    Specimens:   ID Type Source Tests Collected by Time Destination   A : A. LEVEL 4R PERITRACHIAL LYMPH NODE Tissue Lymph Node SURGICAL PATHOLOGY Juan Antonio Draper MD 1/17/2020 3331    B : B. PRECARINAL AND SUBCARINAL LYMPH NODES Tissue Lymph Node SURGICAL PATHOLOGY Juan Antonio Draper MD 1/17/2020 0916    C : C. PRECARINAL LYMPH NODE #3. Tissue Lymph Node SURGICAL PATHOLOGY Juan Antonio Draper MD 1/17/2020 4936    D : D. PRECARINAL LYMPH NODE SPECIMEN #4. Tissue Lymph Node SURGICAL PATHOLOGY Juan Antonio Draper MD 1/17/2020 8044        Implants:  * No implants in log *      Drains:   Urethral Catheter Non-latex; Temperature probe 16 fr (Active)       Findings:   2R, 4R and level 7 nodes were biopsied and sent for frozen section which came back negative for malignancy, some mild lymphoid hyperplasia noted  Anthracotic normal appearing mediastinal lymph nodes    Juan Antonio Draper MD  Date: 1/17/2020  Time: 10:35 AM

## 2020-01-17 NOTE — ANESTHESIA PRE PROCEDURE
Department of Anesthesiology  Preprocedure Note       Name:  Ana Steel   Age:  68 y.o.  :  1950                                          MRN:  [id number]         Date:  2020      Surgeon: Craig Alvarez):  Juan Antonio Draper MD    Procedure: CERVICAL MEDIASTINOSCOPY WITH LYMPH NODE BIOPSY (N/A )    Medications prior to admission:   Prior to Admission medications    Medication Sig Start Date End Date Taking? Authorizing Provider   magnesium gluconate (MAGONATE) 500 MG tablet Take 250 mg by mouth daily as needed    Historical Provider, MD   propranolol (INDERAL) 20 MG tablet TAKE ONE TABLET BY MOUTH THREE TIMES A DAY 9/10/19   Titi Ferreira MD   citalopram (CELEXA) 20 MG tablet Take 1 tablet by mouth daily 9/10/19   Titi Ferreira MD   atorvastatin (LIPITOR) 40 MG tablet TAKE ONE TABLET BY MOUTH DAILY  Patient taking differently: 20 mg daily TAKE ONE TABLET BY MOUTH DAILY 9/10/19   Titi Ferreira MD   Multiple Vitamins-Minerals (THERAPEUTIC MULTIVITAMIN-MINERALS) tablet Take 1 tablet by mouth daily    Historical Provider, MD   vitamin C (ASCORBIC ACID) 500 MG tablet Take 1,000 mg by mouth daily    Historical Provider, MD   Psyllium (METAMUCIL FIBER PO) Take 3 capsules by mouth daily     Historical Provider, MD   Aspirin-Acetaminophen-Caffeine (EXCEDRIN PO) Take 2 tablets by mouth as needed. Historical Provider, MD   Phenylephrine-Aspirin (EVITA-SELTZER PLUS SINUS PO) Take 2 tablets by mouth as needed. Historical Provider, MD       Current medications:    No current facility-administered medications for this visit. No current outpatient medications on file.      Facility-Administered Medications Ordered in Other Visits   Medication Dose Route Frequency Provider Last Rate Last Dose    aminocaproic acid (AMICAR) 5,000 mg in sodium chloride 0.9 % 250 mL infusion bolus  5 g Intravenous Once PRN Juan Antonio Draper MD        insulin regular (HUMULIN R;NOVOLIN R) 100 Units in  Smoking status: Former Smoker     Packs/day: 1.50     Years: 40.00     Pack years: 60.00     Types: Cigarettes     Last attempt to quit: 2007     Years since quittin.6    Smokeless tobacco: Never Used   Substance Use Topics    Alcohol use: No     Comment: none                                 Counseling given: Not Answered      Vital Signs (Current): There were no vitals filed for this visit. BP Readings from Last 3 Encounters:   20 (!) 144/87   01/15/20 (!) 172/88   20 134/76       NPO Status:                                                                                 BMI:   Wt Readings from Last 3 Encounters:   20 142 lb 4.8 oz (64.5 kg)   01/15/20 147 lb (66.7 kg)   20 147 lb 3.2 oz (66.8 kg)     There is no height or weight on file to calculate BMI.    CBC:   Lab Results   Component Value Date    WBC 6.2 01/15/2020    RBC 4.98 01/15/2020    HGB 14.7 01/15/2020    HCT 44.3 01/15/2020    MCV 89.0 01/15/2020    RDW 13.2 01/15/2020     01/15/2020       CMP:   Lab Results   Component Value Date     01/15/2020    K 4.5 01/15/2020     01/15/2020    CO2 26 01/15/2020    BUN 14 01/15/2020    CREATININE 0.7 01/15/2020    GFRAA >60 01/15/2020    AGRATIO 2.1 11/10/2017    LABGLOM >60 01/15/2020    LABGLOM 79 06/15/2015    GLUCOSE 100 01/15/2020    PROT 6.5 11/10/2017    CALCIUM 9.9 01/15/2020    BILITOT 0.6 11/10/2017    ALKPHOS 67 11/10/2017    AST 27 11/10/2017    ALT 18 11/10/2017       POC Tests: No results for input(s): POCGLU, POCNA, POCK, POCCL, POCBUN, POCHEMO, POCHCT in the last 72 hours. Coags:   Lab Results   Component Value Date    PROTIME 11.4 01/15/2020    INR 0.98 01/15/2020    APTT 34.1 01/15/2020       HCG (If Applicable): No results found for: PREGTESTUR, PREGSERUM, HCG, HCGQUANT     ABGs: No results found for: PHART, PO2ART, JVF9RLN, MHF6ESW, BEART, N0HDAWUR     Type & Screen (If Applicable):   No results found for: LABABO, [address]    Anesthesia Evaluation  Patient summary reviewed and Nursing notes reviewed  Airway: Mallampati: II  TM distance: >3 FB   Neck ROM: full  Mouth opening: > = 3 FB Dental:          Pulmonary:   (+) COPD (QUIT SMOKING 27 Y): moderate,                            ROS comment: PFT   FEV1 %Pred-Pre 12/16/2019 55    FEV1 %Pred-Post 12/16/2019 59    FEV1/FVC-Pre 12/16/2019 78    FEV1/FVC-Post 12/16/2019 81    DLCO %Pred 12/16/2019 70    TLC %Pred 12/16/2019 110     LUNG SMALL CELL CA   Cardiovascular:  Exercise tolerance: good (>4 METS),   (+) hypertension: moderate, valvular problems/murmurs: MVP,                   Neuro/Psych:   (+) headaches: migraine headaches, psychiatric history: stable with treatmentdepression/anxiety             GI/Hepatic/Renal:             Endo/Other:                     Abdominal:           Vascular:                                          Anesthesia Plan      general     ASA 3       Induction: intravenous. arterial line  MIPS: Postoperative opioids intended, Prophylactic antiemetics administered and Postoperative trial extubation. Anesthetic plan and risks discussed with patient.                       Annabella Greer MD   1/17/2020

## 2020-01-17 NOTE — ANESTHESIA POSTPROCEDURE EVALUATION
Department of Anesthesiology  Postprocedure Note    Patient: Neha Norris  MRN: [id number]  YOB: 1950  Date of evaluation: 1/17/2020  Time:  11:25 AM     Procedure Summary     Date:  01/17/20 Room / Location:  [address]    Anesthesia Start:  4503 Anesthesia Stop:  1340    Procedure:  CERVICAL MEDIASTINOSCOPY WITH LYMPH NODE BIOPSY WITH FROZEN SECTION. (N/A ) Diagnosis:  (RIGHT UPPER LOBE NSCI CARCINOMA)    Surgeon:  Sravani Jimenez MD Responsible Provider:  Derrek Zarco MD    Anesthesia Type:  general ASA Status:  3          Anesthesia Type: general    Alejandrina Phase I: Alejandrina Score: 10    Alejandrina Phase II:      Last vitals: Reviewed and per EMR flowsheets.        Anesthesia Post Evaluation    Patient location during evaluation: PACU  Patient participation: complete - patient participated  Level of consciousness: awake  Airway patency: patent  Nausea & Vomiting: no nausea and no vomiting  Complications: no  Cardiovascular status: blood pressure returned to baseline  Respiratory status: acceptable  Hydration status: euvolemic

## 2020-01-17 NOTE — TELEPHONE ENCOUNTER
Per dr Diya Logan, OV Jan 22 changed to 11:30 am  Thurs Jan 23, arrive 8 am  [facility] registration, nurse will meet pt to admit for surgery. Spoke w/pt aware of all, verb understanding.

## 2020-01-17 NOTE — PROGRESS NOTES
Pt arrived to PACU from [place] [address] in stable condition and is alert. Pt can move extremities to command. Respirations are even on 6L O2 per simple mask. Skin warm, dry, and with appropriate for ethnicity color. Abd is soft. Pain is denied at this time. Neck surgical site(s) intact with closure/dressing= Dermabond surgical glue, well approximated, open to air    Will continue to monitor for safety and comfort.     S/P: CERVICAL VIDEO ASSISTED MEDIASTINOSCOPY WITH LYMPH NODE BIOPSIES WITH FROZEN SECTION with Dr. Robert Kaur at [address] [place] [address],[address] as outpatient    Plan: recover in PACU, then d/c home      [address] RothschildDonis STAUFFERN, RN, [facility]  Pre-Op/Recovery   Same Day Surgery

## 2020-01-21 ENCOUNTER — TELEPHONE (OUTPATIENT)
Dept: CARDIOTHORACIC SURGERY | Age: 70
End: 2020-01-21

## 2020-01-22 ENCOUNTER — OFFICE VISIT (OUTPATIENT)
Dept: CARDIOTHORACIC SURGERY | Age: 70
End: 2020-01-22
Payer: COMMERCIAL

## 2020-01-22 VITALS
DIASTOLIC BLOOD PRESSURE: 98 MMHG | BODY MASS INDEX: 22.19 KG/M2 | WEIGHT: 146.4 LBS | HEIGHT: 68 IN | SYSTOLIC BLOOD PRESSURE: 130 MMHG | OXYGEN SATURATION: 96 % | TEMPERATURE: 97.5 F | RESPIRATION RATE: 14 BRPM | HEART RATE: 92 BPM

## 2020-01-22 PROCEDURE — 99024 POSTOP FOLLOW-UP VISIT: CPT | Performed by: THORACIC SURGERY (CARDIOTHORACIC VASCULAR SURGERY)

## 2020-01-22 PROCEDURE — 39402 MEDIASTINOSCPY W/LMPH NOD BX: CPT | Performed by: THORACIC SURGERY (CARDIOTHORACIC VASCULAR SURGERY)

## 2020-01-22 NOTE — PROGRESS NOTES
Progress Note    CC:  Postoperative follow-up    S/P cervical video-assisted mediastinoscopy on January 17, 2019    Subjective:   Patient states feels she is feeling good overall. She denies any neck pain, shortness of breath, drainage from her incisions, fevers, chills or any other constitutional symptoms. She does report occasional dry cough. Vital Signs:                                                 BP (!) 130/98 (Site: Right Upper Arm, Position: Sitting)   Pulse 92   Temp 97.5 °F (36.4 °C)   Resp 14   Ht 5' 8\" (1.727 m)   Wt 146 lb 6.4 oz (66.4 kg)   SpO2 96%   BMI 22.26 kg/m²        CV:   Regular rate and rhythm with no rubs or murmurs. Pulm: Clear lung fields with no rales or rhonchi. Incisions:    Cervical incision is clean, dry and intact. No signs of infection. Sternum is stable. Abd:  Soft  Ext: Leg incision is clean, dry and intact. No peripheral edema. Assessment/Plan:  Overall doing very well post cervical mediastinoscopy and mediastinal lymph node biopsies for her known right upper lobe non-small cell carcinoma and PET positive mediastinal adenopathy. She had a cervical incision is healing nicely. Unfortunately the final pathology report shows metastatic adenocarcinoma foci in 1 of the mediastinal 4R lymph nodes which makes her N2 disease. It was explained to her that based on these final pathology results she is not a surgical candidate for RUL lobectomy at this point. She will need to be referred to one of our [facility] oncologis for neoadjuvant treatment. She mentioned that she knows very well Dr. Mary Mcdonald at [facility] as they were classmates in high school and she has also participated in her mother's care. I took the liberty to call Dr. Yevonne Severs who happily accepted the referral and arrange an appointment for her in a couple of days.   I also notified her pulmonologist Dr. Kuldip Marrufo about the above referral.  The patient may increase activities as

## 2020-01-22 NOTE — LETTER
01/22/20    [facility] ([facility]) Cardiovascular and Thoracic Surgeons  [address] [address]    RE:    Neha Alatorre,   1950    Dear Dr. Lelia Garcia,    It was my pleasure to see your patient, Neha Alatorre, in the office today in follow up of her recent hospital stay at [facility].  As you recall she is 68 y.o. female who presented with RUL non-small cell carcinoma and PET positive lymphadenopathy. she  is status post cervical mediastinoscopy with mediastinal lymph node biopsies on January 17, 2019. Her post-op recovery was unremarkable and she was discharged on the same day. Unfortunately here final pathology report from right paratracheal node is positive for metastatic adenocarcinoma foci which raises the stage of her disease to N2. As discussed, will refer patient to Dr. Danita Rizzo from [facility] oncology at [facility] for neoadjuvant therapy. I have attached a copy of the office evaluation. Thank you for allowing me to participate in the care of this patient.       Sincerely,          Gagan Draper MD    CC: No Recipients

## 2020-01-29 ENCOUNTER — HOSPITAL ENCOUNTER (OUTPATIENT)
Dept: MRI IMAGING | Age: 70
Discharge: HOME | End: 2020-01-29
Payer: COMMERCIAL

## 2020-01-29 PROBLEM — Z01.810 PREOP CARDIOVASCULAR EXAM: Status: RESOLVED | Noted: 2019-12-30 | Resolved: 2020-01-29

## 2020-01-29 PROCEDURE — 70553 MRI BRAIN STEM W/O & W/DYE: CPT

## 2020-01-29 PROCEDURE — A9577 INJ MULTIHANCE: HCPCS | Performed by: INTERNAL MEDICINE

## 2020-01-29 PROCEDURE — 6360000004 HC RX CONTRAST MEDICATION: Performed by: INTERNAL MEDICINE

## 2020-01-29 PROCEDURE — 2580000003 HC RX 258: Performed by: INTERNAL MEDICINE

## 2020-01-29 RX ORDER — SODIUM CHLORIDE 0.9 % (FLUSH) 0.9 %
10 SYRINGE (ML) INJECTION ONCE
Status: COMPLETED | OUTPATIENT
Start: 2020-01-29 | End: 2020-01-29

## 2020-01-29 RX ADMIN — Medication 10 ML: at 13:58

## 2020-01-29 RX ADMIN — GADOBENATE DIMEGLUMINE 13 ML: 529 INJECTION, SOLUTION INTRAVENOUS at 13:57

## 2020-02-05 ENCOUNTER — TELEPHONE (OUTPATIENT)
Dept: INTERNAL MEDICINE CLINIC | Age: 70
End: 2020-02-05

## 2020-02-19 ENCOUNTER — HOSPITAL ENCOUNTER (OUTPATIENT)
Age: 70
Discharge: HOME | End: 2020-02-19
Payer: COMMERCIAL

## 2020-02-19 LAB
CORTISOL - AM: 8.3 UG/DL (ref 4.3–22.4)
FOLLICLE STIMULATING HORMONE: 77.8 MIU/ML
LUTEINIZING HORMONE: 37.6 MIU/ML
PROLACTIN: 11.3 NG/ML
T4 FREE: 1.7 NG/DL (ref 0.9–1.8)
TSH SERPL DL<=0.05 MIU/L-ACNC: 1.02 UIU/ML (ref 0.27–4.2)

## 2020-02-19 PROCEDURE — 83001 ASSAY OF GONADOTROPIN (FSH): CPT

## 2020-02-19 PROCEDURE — 84305 ASSAY OF SOMATOMEDIN: CPT

## 2020-02-19 PROCEDURE — 84439 ASSAY OF FREE THYROXINE: CPT

## 2020-02-19 PROCEDURE — 83002 ASSAY OF GONADOTROPIN (LH): CPT

## 2020-02-19 PROCEDURE — 36415 COLL VENOUS BLD VENIPUNCTURE: CPT

## 2020-02-19 PROCEDURE — 84146 ASSAY OF PROLACTIN: CPT

## 2020-02-19 PROCEDURE — 83003 ASSAY GROWTH HORMONE (HGH): CPT

## 2020-02-19 PROCEDURE — 84443 ASSAY THYROID STIM HORMONE: CPT

## 2020-02-19 PROCEDURE — 82533 TOTAL CORTISOL: CPT

## 2020-02-21 LAB
GROWTH HORMONE: 3.75 NG/ML (ref 0.05–8)
IGF-1 (INSULIN-LIKE GROWTH I): 155 NG/ML (ref 24–224)
INSULIN-LIKE GROWTH FACTOR-1 Z-SCORE: 1

## 2020-02-26 ENCOUNTER — HOSPITAL ENCOUNTER (OUTPATIENT)
Dept: CT IMAGING | Age: 70
Discharge: HOME | End: 2020-02-26
Payer: COMMERCIAL

## 2020-02-26 LAB
GFR AFRICAN AMERICAN: >60
GFR NON-AFRICAN AMERICAN: >60
PERFORMED ON: ABNORMAL
POC CREATININE: 0.5 MG/DL (ref 0.6–1.2)
POC SAMPLE TYPE: ABNORMAL

## 2020-02-26 PROCEDURE — 6360000004 HC RX CONTRAST MEDICATION: Performed by: NEUROLOGICAL SURGERY

## 2020-02-26 PROCEDURE — 70496 CT ANGIOGRAPHY HEAD: CPT

## 2020-02-26 PROCEDURE — 82565 ASSAY OF CREATININE: CPT

## 2020-02-26 RX ADMIN — IOPAMIDOL 80 ML: 755 INJECTION, SOLUTION INTRAVENOUS at 13:34

## 2020-04-03 ENCOUNTER — HOSPITAL ENCOUNTER (OUTPATIENT)
Dept: CT IMAGING | Age: 70
Discharge: HOME | End: 2020-04-03
Payer: COMMERCIAL

## 2020-04-03 PROCEDURE — 6360000004 HC RX CONTRAST MEDICATION: Performed by: INTERNAL MEDICINE

## 2020-04-03 PROCEDURE — 71260 CT THORAX DX C+: CPT

## 2020-04-03 RX ADMIN — IOPAMIDOL 75 ML: 755 INJECTION, SOLUTION INTRAVENOUS at 14:51

## 2020-04-28 ENCOUNTER — HOSPITAL ENCOUNTER (OUTPATIENT)
Dept: MRI IMAGING | Age: 70
Discharge: HOME | End: 2020-04-28
Payer: COMMERCIAL

## 2020-04-28 PROCEDURE — 6360000004 HC RX CONTRAST MEDICATION: Performed by: NEUROLOGICAL SURGERY

## 2020-04-28 PROCEDURE — 2580000003 HC RX 258: Performed by: NEUROLOGICAL SURGERY

## 2020-04-28 PROCEDURE — A9577 INJ MULTIHANCE: HCPCS | Performed by: NEUROLOGICAL SURGERY

## 2020-04-28 PROCEDURE — 70553 MRI BRAIN STEM W/O & W/DYE: CPT

## 2020-04-28 RX ORDER — SODIUM CHLORIDE 0.9 % (FLUSH) 0.9 %
10 SYRINGE (ML) INJECTION ONCE
Status: COMPLETED | OUTPATIENT
Start: 2020-04-28 | End: 2020-04-28

## 2020-04-28 RX ADMIN — GADOBENATE DIMEGLUMINE 13 ML: 529 INJECTION, SOLUTION INTRAVENOUS at 10:49

## 2020-04-28 RX ADMIN — Medication 10 ML: at 10:54

## 2020-04-29 ENCOUNTER — OFFICE VISIT (OUTPATIENT)
Dept: CARDIOTHORACIC SURGERY | Age: 70
End: 2020-04-29
Payer: COMMERCIAL

## 2020-04-29 VITALS
BODY MASS INDEX: 22.16 KG/M2 | DIASTOLIC BLOOD PRESSURE: 82 MMHG | HEIGHT: 68 IN | TEMPERATURE: 97.6 F | OXYGEN SATURATION: 97 % | WEIGHT: 146.2 LBS | SYSTOLIC BLOOD PRESSURE: 112 MMHG | HEART RATE: 92 BPM | RESPIRATION RATE: 14 BRPM

## 2020-04-29 PROCEDURE — 99214 OFFICE O/P EST MOD 30 MIN: CPT | Performed by: THORACIC SURGERY (CARDIOTHORACIC VASCULAR SURGERY)

## 2020-04-29 RX ORDER — CLOPIDOGREL BISULFATE 75 MG/1
75 TABLET ORAL DAILY
COMMUNITY
End: 2020-12-14

## 2020-04-29 RX ORDER — ASPIRIN 325 MG
325 TABLET ORAL DAILY
COMMUNITY
End: 2020-12-14

## 2020-04-29 RX ORDER — CALCIUM CARBONATE 500(1250)
500 TABLET ORAL DAILY
COMMUNITY

## 2020-04-29 NOTE — PROGRESS NOTES
wheezes or rubs. No palpable/percussable abnormalities. Cardiovascular: regular, no murmur. PMI normal, no thrill. No carotid bruits. No edema or varicosities. Abdominal aorta cannot be appreciated given body habitus. Pulses:    carotid brachial radial femoral popliteal DP PT   RIGHT    +2    +2  +2   LEFT    +2    +2  +2   GI: abdomen soft, nondistended, no organomegaly. No masses. Lymphatic: no cervical/supraclavicular adenopathy  Musculoskeletal: strength and tone normal. Full ROM. No scoliosis. Extremities: warm and pink. No clubbing or cyanosis or petechiae. There is no muscle loss in the RUE, there is normal hair and there is no concerning clinical finding of RUE ischemia. There is no differential blood pressure between the right upper and left upper extremities. Skin: no dermatitis or ulceration. No nodularity or induration. Neuro: CN grossly intact. Sensation and motor function grossly intact. Psychiatric: oriented, appropriate mood/affect. MEDICAL DECISION MAKING/TESTING  Studies personally reviewed.       PET/CT: 2019  3.8 cm spiculated right upper lobe mass has metabolic characteristics   compatible with primary lung carcinoma.       Jocelin hypermetabolism within the mediastinum and at the tyron, concerning for   jocelin metastatic disease.       Several scattered punctate pulmonary nodules are seen, too small to   characterize as well as two subcentimeter low-density lesions within the   inferior liver.  Continued attention on CT follow-up is recommended.           CT chest screenin2019  FINDINGS:   Mediastinum: Thyroid gland appears normal.  Atherosclerotic change seen in   aorta.  Small mediastinal nodes are noted.  Precarinal node measures 1.8 cm x   1.6 cm.  Coronary artery calcification is seen.  Aortic valve calcification   is seen.  Small hiatal hernia seen.  There nonspecific thickening at the GE   junction.       Lungs/pleura: Large spiculated mass is seen in the right upper lobe,   measuring 3.4 cm x 3.3 cm, in the periphery of the right upper lobe.  A few   scattered ground-glass opacities are seen in the right upper lobe.  There is   mild underlying emphysema.  No large blebs or bulla.       There is a subtle ill-defined ground-glass opacity superiorly medially in the   left upper lobe measuring 1.4 cm medial-lateral.       Upper Abdomen: Atherosclerotic change seen in aorta.  No obvious adrenal mass.       Soft Tissues/Bones: Spurring is seen in the spine.  Remote appearing   compression deformities are seen.  Spurring is seen in the shoulder joints     Spirometry: 12/16/2019  Flow volume loops were obstructed. The FEV-1/FVC ratio was   decreased. The FEV-1 was 1.45 liters (55% of predicted), which   was moderately decreased. The FVC was 2.46 liters (71% of   predicted), which was decreased. Response to inhaled   bronchodilators (albuterol) was not significant. Lung volumes:  Lung volumes were tested by plethysmography. The total lung   capacity was 6.18 liters (110% of predicted), which was normal.   The residual volume was 3.23 liters (142% of predicted), which   was increased. The ratio of residual volume to total lung   capacity (RV/TLC) was 52, which was increased. Diffusion capacity was found to be midly decreased.       Interpretation:  Moderate obstructive airway disease with no significant   bronchodilator reversibility. Labs:   CBC: No results for input(s): WBC, HGB, HCT, MCV, PLT in the last 72 hours. BMP: No results for input(s): NA, K, CL, CO2, PHOS, BUN, CREATININE, CALCIUM, MG in the last 72 hours. Cardiac Enzymes: No results for input(s): CKTOTAL, CKMB, CKMBINDEX, TROPONINI in the last 72 hours. PT/INR: No results for input(s): PROTIME, INR in the last 72 hours. APTT: No results for input(s): APTT in the last 72 hours.   Liver Profile:  Lab Results   Component Value Date    AST 27 11/10/2017    ALT 18 11/10/2017    BILITOT 0.6 11/10/2017    ALKPHOS 67

## 2020-05-19 ENCOUNTER — VIRTUAL VISIT (OUTPATIENT)
Dept: INTERNAL MEDICINE CLINIC | Age: 70
End: 2020-05-19
Payer: COMMERCIAL

## 2020-05-19 VITALS
HEART RATE: 92 BPM | WEIGHT: 146 LBS | BODY MASS INDEX: 22.13 KG/M2 | SYSTOLIC BLOOD PRESSURE: 112 MMHG | HEIGHT: 68 IN | DIASTOLIC BLOOD PRESSURE: 82 MMHG

## 2020-05-19 PROBLEM — C34.91 METASTATIC PRIMARY LUNG CANCER, RIGHT (HCC): Status: ACTIVE | Noted: 2020-05-19

## 2020-05-19 PROCEDURE — 99214 OFFICE O/P EST MOD 30 MIN: CPT | Performed by: INTERNAL MEDICINE

## 2020-05-19 RX ORDER — ALPRAZOLAM 0.5 MG/1
0.25 TABLET ORAL PRN
Qty: 20 TABLET | Refills: 0 | Status: SHIPPED | OUTPATIENT
Start: 2020-05-19 | End: 2020-06-18

## 2020-05-19 ASSESSMENT — PATIENT HEALTH QUESTIONNAIRE - PHQ9
SUM OF ALL RESPONSES TO PHQ QUESTIONS 1-9: 1
SUM OF ALL RESPONSES TO PHQ QUESTIONS 1-9: 1
1. LITTLE INTEREST OR PLEASURE IN DOING THINGS: 0
SUM OF ALL RESPONSES TO PHQ9 QUESTIONS 1 & 2: 1
2. FEELING DOWN, DEPRESSED OR HOPELESS: 1

## 2020-05-19 NOTE — PROGRESS NOTES
Chief Complaint   Patient presents with    Hypertension     BP has been under good control    Hyperlipidemia     would like to discuss Atorvastatin dosage    Anxiety     HPI:  HTN: The patient is tolerating blood pressure medication well and taking them as directed. BP control outside of the office is reported as well controlled. No symptoms concerning for end organ damage are present. HLD: she is currently on atorvastatin. 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 She was previously on atorvastatin 40mg, but this caused her leg pain, so she was switched to 20mg. Anxiety: symptoms are modestly aggravated due to COVID-19. She is taking her citalopram and PRN alprazolam. She is coping well. PMHx:  She is currently undergoing treatment for non small cell lung CA. She is doing well. She is currently on Imfinzi. She received an endovascular coil for a cerebral aneurysm last month. ROS:  Neg for dizziness  Neg for vision changes  CV: Neg for chest pain  RESP: neg for dyspnea        EXAM:  /82 Comment:  At cardiothoracic office on 04/29  Pulse 92   Ht 5' 8\" (1.727 m)   Wt 146 lb (66.2 kg)   BMI 22.20 kg/m²    GEN: appears well    Lab Results   Component Value Date    CREATININE 0.5 (L) 02/26/2020    BUN 14 01/15/2020     01/15/2020    K 4.5 01/15/2020     01/15/2020    CO2 26 01/15/2020     Lab Results   Component Value Date    CHOL 170 03/14/2019    CHOL 183 03/24/2017    CHOL 160 03/21/2016     Lab Results   Component Value Date    TRIG 113 03/14/2019    TRIG 122 03/24/2017    TRIG 89 03/21/2016     Lab Results   Component Value Date    HDL 65 (H) 03/14/2019    HDL 65 (H) 03/09/2018    HDL 61 (H) 03/24/2017     Lab Results   Component Value Date    LDLCALC 82 03/14/2019    LDLCALC 79 03/09/2018    LDLCALC 98 03/24/2017     Lab Results   Component Value Date    LABVLDL 23 03/14/2019    LABVLDL 28 03/09/2018    LABVLDL 24 03/24/2017

## 2020-05-22 ENCOUNTER — TELEPHONE (OUTPATIENT)
Dept: CARDIOTHORACIC SURGERY | Age: 70
End: 2020-05-22

## 2020-06-11 ENCOUNTER — TELEPHONE (OUTPATIENT)
Dept: CARDIOTHORACIC SURGERY | Age: 70
End: 2020-06-11

## 2020-06-11 NOTE — TELEPHONE ENCOUNTER
Patient is scheduled for CT scan on 7/1 @10:00 am at [facility] and I have re-scheduled her carotid duplex studies to follow at 11:00 am.

## 2020-07-01 ENCOUNTER — HOSPITAL ENCOUNTER (OUTPATIENT)
Dept: VASCULAR LAB | Age: 70
Discharge: HOME | End: 2020-07-01
Payer: COMMERCIAL

## 2020-07-01 ENCOUNTER — HOSPITAL ENCOUNTER (OUTPATIENT)
Dept: CT IMAGING | Age: 70
Discharge: HOME | End: 2020-07-01
Payer: COMMERCIAL

## 2020-07-01 PROCEDURE — 93880 EXTRACRANIAL BILAT STUDY: CPT

## 2020-07-01 PROCEDURE — 71260 CT THORAX DX C+: CPT

## 2020-07-01 PROCEDURE — 6360000004 HC RX CONTRAST MEDICATION: Performed by: INTERNAL MEDICINE

## 2020-07-01 RX ADMIN — IOPAMIDOL 75 ML: 755 INJECTION, SOLUTION INTRAVENOUS at 10:23

## 2020-09-08 RX ORDER — PROPRANOLOL HYDROCHLORIDE 20 MG/1
TABLET ORAL
Qty: 270 TABLET | Refills: 3 | Status: SHIPPED | OUTPATIENT
Start: 2020-09-08 | End: 2021-09-28

## 2020-09-08 RX ORDER — CITALOPRAM 20 MG/1
TABLET ORAL
Qty: 90 TABLET | Refills: 3 | Status: SHIPPED | OUTPATIENT
Start: 2020-09-08 | End: 2021-05-17

## 2020-10-02 ENCOUNTER — TELEPHONE (OUTPATIENT)
Dept: INTERNAL MEDICINE CLINIC | Age: 70
End: 2020-10-02

## 2020-10-02 NOTE — TELEPHONE ENCOUNTER
----- Message from Cinthya Mchugh sent at 10/2/2020  8:45 AM EDT -----  Subject: Message to Provider    QUESTIONS  Information for Provider? Unable to reach the office   patient would like a flu shot   pneumonia shot(s); possible new shingle shot if needed  ---------------------------------------------------------------------------  --------------  CALL BACK INFO  What is the best way for the office to contact you? OK to leave message on   voicemail  Preferred Call Back Phone Number? [phone]  ---------------------------------------------------------------------------  --------------  SCRIPT ANSWERS  Relationship to Patient?  Self

## 2020-10-21 ENCOUNTER — HOSPITAL ENCOUNTER (OUTPATIENT)
Dept: CT IMAGING | Age: 70
Discharge: HOME | End: 2020-10-21
Payer: COMMERCIAL

## 2020-10-21 ENCOUNTER — HOSPITAL ENCOUNTER (OUTPATIENT)
Dept: MRI IMAGING | Age: 70
Discharge: HOME | End: 2020-10-21
Payer: COMMERCIAL

## 2020-10-21 PROCEDURE — A9577 INJ MULTIHANCE: HCPCS | Performed by: NEUROLOGICAL SURGERY

## 2020-10-21 PROCEDURE — 71260 CT THORAX DX C+: CPT

## 2020-10-21 PROCEDURE — 74160 CT ABDOMEN W/CONTRAST: CPT

## 2020-10-21 PROCEDURE — 6360000004 HC RX CONTRAST MEDICATION: Performed by: NEUROLOGICAL SURGERY

## 2020-10-21 PROCEDURE — 70544 MR ANGIOGRAPHY HEAD W/O DYE: CPT

## 2020-10-21 PROCEDURE — 70553 MRI BRAIN STEM W/O & W/DYE: CPT

## 2020-10-21 PROCEDURE — 6360000004 HC RX CONTRAST MEDICATION: Performed by: INTERNAL MEDICINE

## 2020-10-21 PROCEDURE — 2580000003 HC RX 258: Performed by: NEUROLOGICAL SURGERY

## 2020-10-21 RX ORDER — SODIUM CHLORIDE 0.9 % (FLUSH) 0.9 %
10 SYRINGE (ML) INJECTION ONCE
Status: COMPLETED | OUTPATIENT
Start: 2020-10-21 | End: 2020-10-21

## 2020-10-21 RX ADMIN — IOHEXOL 50 ML: 240 INJECTION, SOLUTION INTRATHECAL; INTRAVASCULAR; INTRAVENOUS; ORAL at 09:08

## 2020-10-21 RX ADMIN — Medication 10 ML: at 10:40

## 2020-10-21 RX ADMIN — IOPAMIDOL 75 ML: 755 INJECTION, SOLUTION INTRAVENOUS at 09:09

## 2020-10-21 RX ADMIN — GADOBENATE DIMEGLUMINE 13 ML: 529 INJECTION, SOLUTION INTRAVENOUS at 10:39

## 2020-10-30 ENCOUNTER — HOSPITAL ENCOUNTER (OUTPATIENT)
Dept: NUCLEAR MEDICINE | Age: 70
Discharge: HOME | End: 2020-10-30
Payer: COMMERCIAL

## 2020-10-30 PROCEDURE — 78306 BONE IMAGING WHOLE BODY: CPT

## 2020-10-30 PROCEDURE — A9503 TC99M MEDRONATE: HCPCS | Performed by: INTERNAL MEDICINE

## 2020-10-30 PROCEDURE — 3430000000 HC RX DIAGNOSTIC RADIOPHARMACEUTICAL: Performed by: INTERNAL MEDICINE

## 2020-10-30 RX ORDER — TC 99M MEDRONATE 20 MG/10ML
25.06 INJECTION, POWDER, LYOPHILIZED, FOR SOLUTION INTRAVENOUS
Status: COMPLETED | OUTPATIENT
Start: 2020-10-30 | End: 2020-10-30

## 2020-10-30 RX ADMIN — TC 99M MEDRONATE 25.06 MILLICURIE: 20 INJECTION, POWDER, LYOPHILIZED, FOR SOLUTION INTRAVENOUS at 11:13

## 2020-11-16 ENCOUNTER — HOSPITAL ENCOUNTER (OUTPATIENT)
Age: 70
End: 2020-11-16
Payer: COMMERCIAL

## 2020-11-17 ENCOUNTER — OFFICE VISIT (OUTPATIENT)
Dept: INTERNAL MEDICINE CLINIC | Age: 70
End: 2020-11-17
Payer: COMMERCIAL

## 2020-11-17 VITALS
HEART RATE: 88 BPM | DIASTOLIC BLOOD PRESSURE: 70 MMHG | BODY MASS INDEX: 21.98 KG/M2 | HEIGHT: 68 IN | WEIGHT: 145 LBS | TEMPERATURE: 96.9 F | SYSTOLIC BLOOD PRESSURE: 110 MMHG

## 2020-11-17 LAB
ALBUMIN SERPL-MCNC: 4.7 G/DL (ref 3.4–5)
ANION GAP SERPL CALCULATED.3IONS-SCNC: 13 MMOL/L (ref 3–16)
BUN BLDV-MCNC: 15 MG/DL (ref 7–20)
CALCIUM SERPL-MCNC: 10.1 MG/DL (ref 8.3–10.6)
CHLORIDE BLD-SCNC: 105 MMOL/L (ref 99–110)
CHOLESTEROL, TOTAL: 171 MG/DL (ref 0–199)
CO2: 26 MMOL/L (ref 21–32)
CREAT SERPL-MCNC: 0.6 MG/DL (ref 0.6–1.2)
GFR AFRICAN AMERICAN: >60
GFR NON-AFRICAN AMERICAN: >60
GLUCOSE BLD-MCNC: 103 MG/DL (ref 70–99)
HDLC SERPL-MCNC: 62 MG/DL (ref 40–60)
LDL CHOLESTEROL CALCULATED: 87 MG/DL
PHOSPHORUS: 4.3 MG/DL (ref 2.5–4.9)
POTASSIUM SERPL-SCNC: 5.2 MMOL/L (ref 3.5–5.1)
SODIUM BLD-SCNC: 144 MMOL/L (ref 136–145)
TRIGL SERPL-MCNC: 111 MG/DL (ref 0–150)
VLDLC SERPL CALC-MCNC: 22 MG/DL

## 2020-11-17 PROCEDURE — 99214 OFFICE O/P EST MOD 30 MIN: CPT | Performed by: INTERNAL MEDICINE

## 2020-11-17 PROCEDURE — 90750 HZV VACC RECOMBINANT IM: CPT | Performed by: INTERNAL MEDICINE

## 2020-11-17 PROCEDURE — 90471 IMMUNIZATION ADMIN: CPT | Performed by: INTERNAL MEDICINE

## 2020-11-17 NOTE — PROGRESS NOTES
Chief Complaint   Patient presents with    Hypertension     Pt is fasting for labs     Hyperlipidemia    Anxiety        HTN: The patient is tolerating blood pressure medication well and taking as directed. BP control outside of the office is reported as not monitored. No symptoms concerning for end organ damage are present. HLD: She continues to take atorvastatin daily. She tolerates this well. There are no known side effects. Anxiety: She is taking citalopram for this chronic condition. She reports her anxiety has been worsened due to COVID restrictions. She is doing Ruperto Chi with social distancing. Lung ca- currently on Imfinzi. Sees Dr. Adonay España. She reports she is doing well.                Social History     Tobacco Use    Smoking status: Former Smoker     Packs/day: 1.50     Years: 40.00     Pack years: 60.00     Types: Cigarettes     Last attempt to quit: 2007     Years since quittin.4    Smokeless tobacco: Never Used   Substance Use Topics    Alcohol use: No     Comment: none     Drug use: No        ROS:  Neg for cough or wheezing  Neg for dyspnea  Right sided rib pain in a dermatomal distribution for two months  Neg for rash  1BM every other day             EXAM:  /70   Pulse 88   Temp 96.9 °F (36.1 °C) (Temporal)   Ht 5' 8\" (1.727 m)   Wt 145 lb (65.8 kg)   BMI 22.05 kg/m²    GEN: WN/WD, NAD  CV: regular rate and rhythm, no murmurs rubs or gallops  Resp: normal effort, clear auscultation bilaterally  No peripheral edema                Lab Results   Component Value Date    CREATININE 0.5 (L) 2020    BUN 14 01/15/2020     01/15/2020    K 4.5 01/15/2020     01/15/2020    CO2 26 01/15/2020      Lab Results   Component Value Date    CHOL 170 2019    CHOL 183 2017    CHOL 160 2016     Lab Results   Component Value Date    TRIG 113 2019    TRIG 122 2017    TRIG 89 2016     Lab Results   Component Value Date    HDL 65 (H) 03/14/2019    HDL 65 (H) 03/09/2018    HDL 61 (H) 03/24/2017     Lab Results   Component Value Date    LDLCALC 82 03/14/2019    LDLCALC 79 03/09/2018    LDLCALC 98 03/24/2017     Lab Results   Component Value Date    LABVLDL 23 03/14/2019    LABVLDL 28 03/09/2018    LABVLDL 24 03/24/2017     Lab Results   Component Value Date    CHOLHDLRATIO 2.6 06/15/2015      A/P  1. Essential hypertension, benign  Well controlled  The current medical regimen is effective;  continue present plan and medications. - Renal Function Panel    2. Pure hypercholesterolemia  Chronic and stable  The current medical regimen is effective;  continue present plan and medications. - Lipid Panel    3. Anxiety  Chronic and stable  Continue citalopram and Ruperto Chi    4. Metastatic primary lung cancer, right ([address]  Appears to be responding well to immunotherapy. This is managed by Dr. Mary Marcelino. She will follow up with oncology as scheduled. 5. Shingrix dose 1/2 today.        RTO 6 months

## 2020-11-18 ENCOUNTER — HOSPITAL ENCOUNTER (OUTPATIENT)
Dept: PET IMAGING | Age: 70
Discharge: HOME | End: 2020-11-18
Payer: COMMERCIAL

## 2020-11-18 VITALS — HEIGHT: 68 IN | WEIGHT: 190 LBS | BODY MASS INDEX: 28.79 KG/M2

## 2020-11-18 PROCEDURE — 3430000000 HC RX DIAGNOSTIC RADIOPHARMACEUTICAL: Performed by: INTERNAL MEDICINE

## 2020-11-18 PROCEDURE — A9552 F18 FDG: HCPCS | Performed by: INTERNAL MEDICINE

## 2020-11-18 PROCEDURE — 78815 PET IMAGE W/CT SKULL-THIGH: CPT

## 2020-11-18 RX ORDER — FLUDEOXYGLUCOSE F 18 200 MCI/ML
17.45 INJECTION, SOLUTION INTRAVENOUS
Status: COMPLETED | OUTPATIENT
Start: 2020-11-18 | End: 2020-11-18

## 2020-11-18 RX ADMIN — FLUDEOXYGLUCOSE F 18 17.45 MILLICURIE: 200 INJECTION, SOLUTION INTRAVENOUS at 10:42

## 2020-12-07 RX ORDER — ATORVASTATIN CALCIUM 20 MG/1
TABLET, FILM COATED ORAL
Qty: 90 TABLET | Refills: 3 | Status: SHIPPED | OUTPATIENT
Start: 2020-12-07 | End: 2021-12-21

## 2020-12-14 ENCOUNTER — OFFICE VISIT (OUTPATIENT)
Dept: INTERNAL MEDICINE CLINIC | Age: 70
End: 2020-12-14
Payer: COMMERCIAL

## 2020-12-14 VITALS
OXYGEN SATURATION: 99 % | WEIGHT: 143.6 LBS | SYSTOLIC BLOOD PRESSURE: 110 MMHG | HEART RATE: 78 BPM | BODY MASS INDEX: 21.83 KG/M2 | TEMPERATURE: 97.5 F | DIASTOLIC BLOOD PRESSURE: 78 MMHG

## 2020-12-14 LAB
ALBUMIN SERPL-MCNC: 4.3 G/DL (ref 3.4–5)
ANION GAP SERPL CALCULATED.3IONS-SCNC: 11 MMOL/L (ref 3–16)
BUN BLDV-MCNC: 13 MG/DL (ref 7–20)
CALCIUM SERPL-MCNC: 9.8 MG/DL (ref 8.3–10.6)
CHLORIDE BLD-SCNC: 101 MMOL/L (ref 99–110)
CO2: 26 MMOL/L (ref 21–32)
CREAT SERPL-MCNC: 0.6 MG/DL (ref 0.6–1.2)
GFR AFRICAN AMERICAN: >60
GFR NON-AFRICAN AMERICAN: >60
GLUCOSE BLD-MCNC: 96 MG/DL (ref 70–99)
PHOSPHORUS: 4 MG/DL (ref 2.5–4.9)
POTASSIUM SERPL-SCNC: 4.3 MMOL/L (ref 3.5–5.1)
SODIUM BLD-SCNC: 138 MMOL/L (ref 136–145)

## 2020-12-14 PROCEDURE — 99214 OFFICE O/P EST MOD 30 MIN: CPT | Performed by: NURSE PRACTITIONER

## 2020-12-14 RX ORDER — LEVETIRACETAM 500 MG/1
500 TABLET ORAL 2 TIMES DAILY
Status: ON HOLD | COMMUNITY
End: 2021-05-04

## 2020-12-14 RX ORDER — METHYLPREDNISOLONE 4 MG/1
4 TABLET ORAL SEE ADMIN INSTRUCTIONS
Status: ON HOLD | COMMUNITY
End: 2021-05-04

## 2020-12-14 RX ORDER — ACETAMINOPHEN 500 MG
500 TABLET ORAL EVERY 6 HOURS PRN
COMMUNITY
End: 2021-04-21 | Stop reason: ALTCHOICE

## 2020-12-14 ASSESSMENT — ENCOUNTER SYMPTOMS
SHORTNESS OF BREATH: 0
WHEEZING: 0
CHEST TIGHTNESS: 0
COUGH: 0

## 2020-12-14 NOTE — PROGRESS NOTES
12/14/2020    This is a 65 y.o. female   Chief Complaint   Patient presents with    Pre-op Exam     Gamma knife on brain, [facility], Dr. César Forrest, 12/29     HPI     Neha Bateman is a 65 y.o.  female who comes for a preoperative exam.  She  is referred by Dr. Sam Smith for perioperative risk determination for upcoming surgery for gamma knife radiosurgery on 12/29/2020 for hanna metastasis. Denies taking any asa, blood thinners, fish oil, NSAIDs or herbals. Denies any previous complications with anesthesia. Has lab orders from [facility] to complete today. Seeing [facility] (Dr. Harish Winn) for metastatic lung cancer. Is on Imfinzi. Seeing [facility] for secondary metastatic neoplasm. Neha Bateman returns for follow up of HTN, HLD. Patient has been taking Her medications as prescribed. Patient's blood pressure is  controlled. Side effects related to taking the medications include no medication side effects noted.       Past Medical History:   Diagnosis Date    Aneurysm ([address]     cerebellar    Cancer ([address]     NSCLC    Depression     Hypertension     MVP (mitral valve prolapse)     does not follow with cardiology    Pituitary adenoma [facility])      Past Surgical History:   Procedure Laterality Date    BREAST BIOPSY  1966, 1988    BRONCHOSCOPY N/A 12/6/2019    BRONCHOSCOPY ELECTROMAGNETIC performed by Jassi Lees MD at [facility] N/A 12/6/2019    BRONCHOSCOPY W/EBUS FNA performed by Jassi Lees MD at [facility]  12/6/2019    BRONCHOSCOPY BIOPSY BRONCHUS performed by Jassi Lees MD at [facility]  12/6/2019    BRONCHOSCOPY ALVEOLAR LAVAGE performed by Jassi Lees MD at [facility]  12/6/2019    BRONCHOSCOPY BRUSHINGS performed by Jassi Lees MD at [address] [place] Bilateral 2014. 2009, 1990's   [address]  MEDIASTINOSCOPY N/A 2020    CERVICAL MEDIASTINOSCOPY WITH LYMPH NODE BIOPSY WITH FROZEN SECTION.  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     Social History     Socioeconomic History    Marital status:      Spouse name: Not on file    Number of children: Not on file    Years of education: Not on file    Highest education level: Not on file   Occupational History    Occupation: accounts payable   Social Needs    Financial resource strain: Not on file    Food insecurity     Worry: Not on file     Inability: Not on file   [employer] needs     Medical: Not on file     Non-medical: Not on file   Tobacco Use    Smoking status: Former Smoker     Packs/day: 1.50     Years: 40.00     Pack years: 60.00     Types: Cigarettes     Quit date: 2007     Years since quittin.5    Smokeless tobacco: Never Used   Substance and Sexual Activity    Alcohol use: No     Comment: none     Drug use: No    Sexual activity: Yes     Partners: Male     Comment: , currently in relationship with long term boyfriend   Lifestyle    Physical activity     Days per week: Not on file     Minutes per session: Not on file    Stress: Not on file   Relationships    Social connections     Talks on phone: Not on file     Gets together: Not on file     Attends Restoration service: Not on file     Active member of club or organization: Not on file     Attends meetings of clubs or organizations: Not on file     Relationship status: Not on file    Intimate partner violence     Fear of current or ex partner: Not on file     Emotionally abused: Not on file     Physically abused: Not on file     Forced sexual activity: Not on file   Other Topics Concern    Not on file   Social History Narrative    Not on file     Family History   Problem Relation Age of Onset    Cancer Mother 68        bone     Arthritis Father     High Blood Pressure Father     Parkinsonism Father     Colon Cancer Sister 61     Current Outpatient Medications   Medication Sig Dispense Refill    methylPREDNISolone (MEDROL DOSEPACK) 4 MG tablet Take 4 mg by mouth See Admin Instructions Take by mouth.  levETIRAcetam (KEPPRA) 500 MG tablet Take 500 mg by mouth 2 times daily      acetaminophen (TYLENOL) 500 MG tablet Take 500 mg by mouth every 6 hours as needed for Pain      atorvastatin (LIPITOR) 20 MG tablet TAKE ONE TABLET BY MOUTH DAILY 90 tablet 3    citalopram (CELEXA) 20 MG tablet TAKE ONE TABLET BY MOUTH DAILY 90 tablet 3    propranolol (INDERAL) 20 MG tablet TAKE ONE TABLET BY MOUTH THREE TIMES A  tablet 3    Durvalumab (IMFINZI IV) Infuse intravenously 1 hr every other week for 1 yr beginning April 9, 2020      calcium carbonate (OSCAL) 500 MG TABS tablet Take 500 mg by mouth daily      magnesium gluconate (MAGONATE) 500 MG tablet Take 250 mg by mouth daily as needed      Multiple Vitamins-Minerals (THERAPEUTIC MULTIVITAMIN-MINERALS) tablet Take 1 tablet by mouth daily      vitamin C (ASCORBIC ACID) 500 MG tablet Take 1,000 mg by mouth daily      Psyllium (METAMUCIL FIBER PO) Take 3 capsules by mouth daily       Phenylephrine-Aspirin (EVITA-SELTZER PLUS SINUS PO) Take 2 tablets by mouth as needed. No current facility-administered medications for this visit.       Allergies   Allergen Reactions    Nsaids      Causes itching and swelling     Office Visit on 11/17/2020   Component Date Value Ref Range Status    Sodium 11/17/2020 144  136 - 145 mmol/L Final    Potassium 11/17/2020 5.2* 3.5 - 5.1 mmol/L Final    Chloride 11/17/2020 105  99 - 110 mmol/L Final    CO2 11/17/2020 26  21 - 32 mmol/L Final    Anion Gap 11/17/2020 13  3 - 16 Final    Glucose 11/17/2020 103* 70 - 99 mg/dL Final    BUN 11/17/2020 15  7 - 20 mg/dL Final    CREATININE 11/17/2020 0.6  0.6 - 1.2 mg/dL Final    GFR Non-African American

## 2020-12-16 ENCOUNTER — HOSPITAL ENCOUNTER (OUTPATIENT)
Dept: MRI IMAGING | Age: 70
Discharge: HOME | End: 2020-12-16
Payer: COMMERCIAL

## 2020-12-16 PROCEDURE — A9581 GADOXETATE DISODIUM INJ: HCPCS | Performed by: INTERNAL MEDICINE

## 2020-12-16 PROCEDURE — 2580000003 HC RX 258: Performed by: INTERNAL MEDICINE

## 2020-12-16 PROCEDURE — 6360000004 HC RX CONTRAST MEDICATION: Performed by: INTERNAL MEDICINE

## 2020-12-16 PROCEDURE — 74183 MRI ABD W/O CNTR FLWD CNTR: CPT

## 2020-12-16 RX ORDER — SODIUM CHLORIDE 0.9 % (FLUSH) 0.9 %
10 SYRINGE (ML) INJECTION ONCE
Status: COMPLETED | OUTPATIENT
Start: 2020-12-16 | End: 2020-12-16

## 2020-12-16 RX ADMIN — GADOXETATE DISODIUM 7 ML: 181.43 INJECTION, SOLUTION INTRAVENOUS at 11:25

## 2020-12-16 RX ADMIN — Medication 10 ML: at 11:25

## 2020-12-28 ENCOUNTER — PRE-PROCEDURE TELEPHONE (OUTPATIENT)
Dept: RADIATION ONCOLOGY | Age: 70
End: 2020-12-28

## 2020-12-29 ENCOUNTER — ANESTHESIA EVENT (OUTPATIENT)
Dept: RADIATION ONCOLOGY | Age: 70
End: 2020-12-29

## 2020-12-29 ENCOUNTER — HOSPITAL ENCOUNTER (OUTPATIENT)
Dept: MRI IMAGING | Age: 70
Discharge: HOME | End: 2020-12-29
Payer: COMMERCIAL

## 2020-12-29 ENCOUNTER — HOSPITAL ENCOUNTER (OUTPATIENT)
Dept: RADIATION ONCOLOGY | Age: 70
Discharge: HOME | End: 2020-12-29
Payer: COMMERCIAL

## 2020-12-29 ENCOUNTER — ANESTHESIA (OUTPATIENT)
Dept: RADIATION ONCOLOGY | Age: 70
End: 2020-12-29

## 2020-12-29 VITALS
HEIGHT: 68 IN | DIASTOLIC BLOOD PRESSURE: 83 MMHG | BODY MASS INDEX: 21.52 KG/M2 | OXYGEN SATURATION: 94 % | WEIGHT: 142 LBS | TEMPERATURE: 98 F | HEART RATE: 90 BPM | RESPIRATION RATE: 18 BRPM | SYSTOLIC BLOOD PRESSURE: 136 MMHG

## 2020-12-29 VITALS — OXYGEN SATURATION: 100 % | SYSTOLIC BLOOD PRESSURE: 122 MMHG | DIASTOLIC BLOOD PRESSURE: 76 MMHG

## 2020-12-29 PROCEDURE — 2580000003 HC RX 258: Performed by: NEUROLOGICAL SURGERY

## 2020-12-29 PROCEDURE — 2580000003 HC RX 258: Performed by: NURSE ANESTHETIST, CERTIFIED REGISTERED

## 2020-12-29 PROCEDURE — 6360000002 HC RX W HCPCS: Performed by: NURSE ANESTHETIST, CERTIFIED REGISTERED

## 2020-12-29 PROCEDURE — A9579 GAD-BASE MR CONTRAST NOS,1ML: HCPCS | Performed by: NEUROLOGICAL SURGERY

## 2020-12-29 PROCEDURE — 70553 MRI BRAIN STEM W/O & W/DYE: CPT

## 2020-12-29 PROCEDURE — 2500000003 HC RX 250 WO HCPCS: Performed by: NURSE ANESTHETIST, CERTIFIED REGISTERED

## 2020-12-29 PROCEDURE — 6360000004 HC RX CONTRAST MEDICATION: Performed by: NEUROLOGICAL SURGERY

## 2020-12-29 PROCEDURE — 2500000003 HC RX 250 WO HCPCS: Performed by: NEUROLOGICAL SURGERY

## 2020-12-29 PROCEDURE — 3700000000 HC ANESTHESIA ATTENDED CARE

## 2020-12-29 PROCEDURE — 3700000001 HC ADD 15 MINUTES (ANESTHESIA)

## 2020-12-29 PROCEDURE — 6370000000 HC RX 637 (ALT 250 FOR IP): Performed by: NEUROLOGICAL SURGERY

## 2020-12-29 PROCEDURE — 6360000002 HC RX W HCPCS: Performed by: NEUROLOGICAL SURGERY

## 2020-12-29 RX ORDER — 0.9 % SODIUM CHLORIDE 0.9 %
500 INTRAVENOUS SOLUTION INTRAVENOUS ONCE
Status: COMPLETED | OUTPATIENT
Start: 2020-12-29 | End: 2020-12-29

## 2020-12-29 RX ORDER — BACITRACIN, NEOMYCIN, POLYMYXIN B 400; 3.5; 5 [USP'U]/G; MG/G; [USP'U]/G
OINTMENT TOPICAL ONCE
Status: COMPLETED | OUTPATIENT
Start: 2020-12-29 | End: 2020-12-29

## 2020-12-29 RX ORDER — PROPOFOL 10 MG/ML
INJECTION, EMULSION INTRAVENOUS PRN
Status: DISCONTINUED | OUTPATIENT
Start: 2020-12-29 | End: 2020-12-29 | Stop reason: SDUPTHER

## 2020-12-29 RX ORDER — TRAMADOL HYDROCHLORIDE 50 MG/1
25 TABLET ORAL EVERY 6 HOURS PRN
Status: ON HOLD | COMMUNITY
End: 2021-05-04

## 2020-12-29 RX ORDER — LIDOCAINE HYDROCHLORIDE 10 MG/ML
30 INJECTION, SOLUTION EPIDURAL; INFILTRATION; INTRACAUDAL; PERINEURAL ONCE
Status: COMPLETED | OUTPATIENT
Start: 2020-12-29 | End: 2020-12-29

## 2020-12-29 RX ORDER — DEXAMETHASONE SODIUM PHOSPHATE 4 MG/ML
4 INJECTION, SOLUTION INTRA-ARTICULAR; INTRALESIONAL; INTRAMUSCULAR; INTRAVENOUS; SOFT TISSUE ONCE
Status: DISCONTINUED | OUTPATIENT
Start: 2020-12-29 | End: 2020-12-30 | Stop reason: HOSPADM

## 2020-12-29 RX ORDER — LIDOCAINE HYDROCHLORIDE 20 MG/ML
INJECTION, SOLUTION INFILTRATION; PERINEURAL PRN
Status: DISCONTINUED | OUTPATIENT
Start: 2020-12-29 | End: 2020-12-29 | Stop reason: SDUPTHER

## 2020-12-29 RX ORDER — ONDANSETRON 2 MG/ML
4 INJECTION INTRAMUSCULAR; INTRAVENOUS EVERY 6 HOURS PRN
Status: DISCONTINUED | OUTPATIENT
Start: 2020-12-29 | End: 2020-12-30 | Stop reason: HOSPADM

## 2020-12-29 RX ORDER — SODIUM CHLORIDE 9 MG/ML
INJECTION, SOLUTION INTRAVENOUS CONTINUOUS PRN
Status: DISCONTINUED | OUTPATIENT
Start: 2020-12-29 | End: 2020-12-29 | Stop reason: SDUPTHER

## 2020-12-29 RX ORDER — BUPIVACAINE HYDROCHLORIDE 5 MG/ML
30 INJECTION, SOLUTION EPIDURAL; INTRACAUDAL ONCE
Status: COMPLETED | OUTPATIENT
Start: 2020-12-29 | End: 2020-12-29

## 2020-12-29 RX ORDER — SODIUM BICARBONATE 42 MG/ML
1.5 INJECTION, SOLUTION INTRAVENOUS ONCE
Status: COMPLETED | OUTPATIENT
Start: 2020-12-29 | End: 2020-12-29

## 2020-12-29 RX ADMIN — BUPIVACAINE HYDROCHLORIDE 150 MG: 5 INJECTION, SOLUTION EPIDURAL; INTRACAUDAL; PERINEURAL at 08:00

## 2020-12-29 RX ADMIN — GADOTERIDOL 26 ML: 279.3 INJECTION, SOLUTION INTRAVENOUS at 10:32

## 2020-12-29 RX ADMIN — ONDANSETRON HYDROCHLORIDE 4 MG: 2 SOLUTION INTRAMUSCULAR; INTRAVENOUS at 06:39

## 2020-12-29 RX ADMIN — SODIUM CHLORIDE: 9 INJECTION, SOLUTION INTRAVENOUS at 07:53

## 2020-12-29 RX ADMIN — PROPOFOL 50 MG: 10 INJECTION, EMULSION INTRAVENOUS at 07:55

## 2020-12-29 RX ADMIN — LIDOCAINE HYDROCHLORIDE 30 ML: 10 INJECTION, SOLUTION EPIDURAL; INFILTRATION; INTRACAUDAL; PERINEURAL at 08:00

## 2020-12-29 RX ADMIN — LIDOCAINE HYDROCHLORIDE 100 MG: 20 INJECTION, SOLUTION INFILTRATION; PERINEURAL at 07:53

## 2020-12-29 RX ADMIN — SODIUM CHLORIDE 500 ML: 9 INJECTION, SOLUTION INTRAVENOUS at 06:38

## 2020-12-29 RX ADMIN — PROPOFOL 50 MG: 10 INJECTION, EMULSION INTRAVENOUS at 07:59

## 2020-12-29 RX ADMIN — PROPOFOL 50 MG: 10 INJECTION, EMULSION INTRAVENOUS at 08:03

## 2020-12-29 RX ADMIN — SODIUM CHLORIDE: 9 INJECTION, SOLUTION INTRAVENOUS at 08:09

## 2020-12-29 RX ADMIN — SODIUM BICARBONATE 1.5 MEQ: 42 INJECTION, SOLUTION INTRAVENOUS at 08:00

## 2020-12-29 RX ADMIN — BACITRACIN ZINC, NEOMYCIN SULFATE, POLYMYXIN B SULFATE: 3.5; 5000; 4 OINTMENT TOPICAL at 11:35

## 2020-12-29 NOTE — H&P
There have been no changes in the patient's condition since the history and physical.    Daniella Burnett.  Jace Putnam MD

## 2020-12-29 NOTE — PROGRESS NOTES
4586  Patient arrived to [facility] department alert and oriented x 4. Patient is neurologically intact. PIV established without difficulty. Initial vitals WNL, and patient denies pain. Hallie Miles, RN       1321  Gamma Knife RN Pre-Procedure Checklist     1. Has the patient ever had any surgery on the head or neck? Yes    -If yes, is the surgery site marked? N/A    2. Has the patient ever received radiation treatment to the head or neck? No       -If yes, is the treatment summary and/or disk of treatment plan available? N/A      -If the patient has had previous Gamma Knife radiosurgery has the most recent imaging been reviewed in the Gamma Plan? N/A      3. Has the patient received chemotherapy or immunotherapy in the past month? Yes      -If yes, list the agent and date of last treatment.       -Durvalumab, 12/17/2020    4. . List the procedure-specific medications taken by the patient this morning:   -Medrol Dose Pack   -Keppra 500mg    5. What is the patients GFR? 89 on 12/3/2020    -For GFR 0-30 => no contrast at MRI    -For GFR 31-59 => single dose contrast at MRI    -For GFR >60 => double dose contrast at MRI    6. Does the patient require a pregnancy test per [address]?  no     -If yes, the result is: na    7. What is the patient's baseline CO2? 33    Liudmila Pacheco, RN      4665  Gamma Knife Frame Placement Time Out     1. Patient states name and birthdate correctly? Yes    2. Procedure listed on consent:  G-Frame placement and Gamma Knife radiosurgery for left parietal brain metastasis    3. Is this the correct procedure? Yes    4. Are the consents signed? Yes    5. Does the patient have only one benign target or lesion? No     -If yes, what side are we treating:  N/A     -Is the side marked for laterality? N/A    6. Have films been reviewed today? Yes    7. Has the interim History and Physical form been completed?  yes 8.  Has the neurosurgeon reviewed the Gamma Knife RN Pre-Procedure Checklist?  Yes    9. Does the patient require a pregnancy test per [address]? no     -If yes, the result was:  na    10. Are all present in agreement? Yes    Those present for time out:  Susan Shaikh RN, ANA Amor RN    8014  Patient received MAC under the supervision of Dr. Laura Mesa and Madeline Escalera CRNA. This RN was present throughout [place] [address] and assumed care from anesthesia for Phase II recovery. The patient is awake and breathing easily. Patient denies pain. See Flow Sheet for vitals and Alejandrina Score. Fredrick Odonnell RN     7957  Pt assisted to restroom with standby assist and back to room. Denies any pain or needs. Awaiting MRI. 1030  After G-frame placement, patient was taken to MRI by this RN where SpO2 and pulse were monitored and remained stable throughout. Patient tolerated the study well. Danita Swain RN     8165  After reviewing the pt's MRI, the lesion that was planned to be treated was unable to be found. The decision was made by Dr. Alvino George and team to no longer proceed with [facility]. Pt was notified by Dr. Alvino George and all details reviewed. Susan Shaikh RN    4422  The G-frame was removed by Dr. Alvino George and Juvencio Lira RN and fixation pin sites were observed for bleeding. None was noted. Pin sites were cleaned with alcohol and povidone-iodine. Dr. Alvino George then placed sutures at all four pin sites. Patient met Phase II discharge criteria. Baseline neurological status unchanged. See discharge Alejandrina score and vitals. Pt was instructed to stop taking steroids and to begin decreasing her Keppra 500mg to one time a day, for 3 days- and then stop. Pt verbalized understanding. Discharge instructions were reviewed with patient who verbalized understanding using teach back method. A written copy of the instructions was given. Patient has follow up appointments to be scheduled. Patient was accompanied to transportation by this RN.     Fredrick Odonnell RN

## 2020-12-29 NOTE — ANESTHESIA POSTPROCEDURE EVALUATION
Department of Anesthesiology  Postprocedure Note    Patient: Neha Gutiérrez  MRN: [id number]  YOB: 1950  Date of evaluation: 12/29/2020  Time:  8:32 AM     Procedure Summary     Date: 12/29/20 Room / Location: [facility]    Anesthesia Start: 0753 Anesthesia Stop:     Procedure: GAMMAKNIFE W ANESTHESIA Diagnosis: Secondary malignant neoplasm of brain    Scheduled Providers:  Responsible Provider: Patricia Aragon MD    Anesthesia Type: general ASA Status: 3          Anesthesia Type: general    Alejandrina Phase I:      Alejandrina Phase II:      Last vitals: Reviewed and per EMR flowsheets.        Anesthesia Post Evaluation    Patient location during evaluation: bedside  Patient participation: complete - patient participated  Level of consciousness: awake  Airway patency: patent  Nausea & Vomiting: no nausea and no vomiting  Complications: no  Cardiovascular status: hemodynamically stable  Respiratory status: acceptable  Hydration status: stable

## 2020-12-29 NOTE — ANESTHESIA PRE PROCEDURE
Department of Anesthesiology  Preprocedure Note       Name:  Gucci Martin   Age:  65 y.o.  :  1950                                          MRN:  [id number]         Date:  2020      Surgeon: * No surgeons listed *    Procedure: * No procedures listed *    Medications prior to admission:   Prior to Admission medications    Medication Sig Start Date End Date Taking? Authorizing Provider   traMADol (ULTRAM) 50 MG tablet Take 25 mg by mouth every 6 hours as needed for Pain. Yes Historical Provider, MD   methylPREDNISolone (MEDROL DOSEPACK) 4 MG tablet Take 4 mg by mouth See Admin Instructions Take by mouth.    Yes Historical Provider, MD   levETIRAcetam (KEPPRA) 500 MG tablet Take 500 mg by mouth 2 times daily   Yes Historical Provider, MD   acetaminophen (TYLENOL) 500 MG tablet Take 500 mg by mouth every 6 hours as needed for Pain   Yes Historical Provider, MD   atorvastatin (LIPITOR) 20 MG tablet TAKE ONE TABLET BY MOUTH DAILY 20  Yes Jorden Spatz, MD   citalopram (CELEXA) 20 MG tablet TAKE ONE TABLET BY MOUTH DAILY 20  Yes Jorden Spatz, MD   propranolol (INDERAL) 20 MG tablet TAKE ONE TABLET BY MOUTH THREE TIMES A DAY 20  Yes Jorden Spatz, MD   Durvalumab (IMFINZI IV) Infuse intravenously 1 hr every other week for 1 yr beginning 2020   Yes Historical Provider, MD   calcium carbonate (OSCAL) 500 MG TABS tablet Take 500 mg by mouth daily   Yes Historical Provider, MD   magnesium gluconate (MAGONATE) 500 MG tablet Take 250 mg by mouth daily as needed   Yes Historical Provider, MD   Multiple Vitamins-Minerals (THERAPEUTIC MULTIVITAMIN-MINERALS) tablet Take 1 tablet by mouth daily   Yes Historical Provider, MD   vitamin C (ASCORBIC ACID) 500 MG tablet Take 1,000 mg by mouth daily   Yes Historical Provider, MD   Psyllium (METAMUCIL FIBER PO) Take 3 capsules by mouth daily    Yes Historical Provider, MD Phenylephrine-Aspirin (EVITA-SELTZER PLUS SINUS PO) Take 2 tablets by mouth as needed. Yes Historical Provider, MD       Current medications:    Current Outpatient Medications   Medication Sig Dispense Refill    traMADol (ULTRAM) 50 MG tablet Take 25 mg by mouth every 6 hours as needed for Pain.  methylPREDNISolone (MEDROL DOSEPACK) 4 MG tablet Take 4 mg by mouth See Admin Instructions Take by mouth.  levETIRAcetam (KEPPRA) 500 MG tablet Take 500 mg by mouth 2 times daily      acetaminophen (TYLENOL) 500 MG tablet Take 500 mg by mouth every 6 hours as needed for Pain      atorvastatin (LIPITOR) 20 MG tablet TAKE ONE TABLET BY MOUTH DAILY 90 tablet 3    citalopram (CELEXA) 20 MG tablet TAKE ONE TABLET BY MOUTH DAILY 90 tablet 3    propranolol (INDERAL) 20 MG tablet TAKE ONE TABLET BY MOUTH THREE TIMES A  tablet 3    Durvalumab (IMFINZI IV) Infuse intravenously 1 hr every other week for 1 yr beginning April 9, 2020      calcium carbonate (OSCAL) 500 MG TABS tablet Take 500 mg by mouth daily      magnesium gluconate (MAGONATE) 500 MG tablet Take 250 mg by mouth daily as needed      Multiple Vitamins-Minerals (THERAPEUTIC MULTIVITAMIN-MINERALS) tablet Take 1 tablet by mouth daily      vitamin C (ASCORBIC ACID) 500 MG tablet Take 1,000 mg by mouth daily      Psyllium (METAMUCIL FIBER PO) Take 3 capsules by mouth daily       Phenylephrine-Aspirin (EVITA-SELTZER PLUS SINUS PO) Take 2 tablets by mouth as needed.        Current Facility-Administered Medications   Medication Dose Route Frequency Provider Last Rate Last Admin    sodium bicarbonate 4.2 % injection 1.5 mEq  1.5 mEq Intradermal Once Marely Day MD        0.9 % sodium chloride bolus  500 mL Intravenous Once Marely Day  mL/hr at 12/29/20 0638 500 mL at 12/29/20 0638    bupivacaine (PF) (MARCAINE) 0.5 % injection 150 mg  30 mL Intradermal Once Marely Day MD  lidocaine PF 1 % injection 30 mL  30 mL Intradermal Once Anette Singh MD        ondansetron [facility]) injection 4 mg  4 mg Intravenous Q6H PRN Anette Sinhg MD   4 mg at 12/29/20 1685    Dexamethasone Sodium Phosphate injection 4 mg  4 mg Intravenous Once Anette Singh MD        neomycin-bacitracin-polymyxin (NEOSPORIN) ointment   Topical Once Anette Singh MD           Allergies:     Allergies   Allergen Reactions    Nsaids      Causes itching and swelling       Problem List:    Patient Active Problem List   Diagnosis Code    Essential hypertension, benign I10    Hyperlipidemia E78.5    Chronic headaches R51.9, G89.29    Anxiety F41.9    Depression F32.9    Mediastinal adenopathy R59.0    Mass of upper lobe of right lung R91.8    Metastatic primary lung cancer, right ([address] C34.91       Past Medical History:        Diagnosis Date    Aneurysm ([address]     cerebellar    Cancer ([facility]     NSCLC    Depression     Hypertension     MVP (mitral valve prolapse)     does not follow with cardiology    Pituitary adenoma [facility])        Past Surgical History:        Procedure Laterality Date    BREAST BIOPSY  1966, 1988    BRONCHOSCOPY N/A 12/6/2019    BRONCHOSCOPY ELECTROMAGNETIC performed by Fortino Trejo MD at [address] N/A 12/6/2019    BRONCHOSCOPY W/EBUS FNA performed by Fortino Trejo MD at [address]  12/6/2019    BRONCHOSCOPY BIOPSY BRONCHUS performed by Fortino Trejo MD at [address]  12/6/2019    BRONCHOSCOPY ALVEOLAR LAVAGE performed by Fortino Trejo MD at [address]  12/6/2019    BRONCHOSCOPY BRUSHINGS performed by Fortino Trejo MD at [address] [place] Bilateral 2014. 2009, 1990's   [address]    MEDIASTINOSCOPY N/A 1/17/2020 CERVICAL MEDIASTINOSCOPY WITH LYMPH NODE BIOPSY WITH FROZEN SECTION. performed by Timothy Draper MD at [address] [facility]      emolization of right posterior inferior cerebellar artery aneurysm       Social History:    Social History     Tobacco Use    Smoking status: Former Smoker     Packs/day: 1.50     Years: 40.00     Pack years: 60.00     Types: Cigarettes     Quit date: 2007     Years since quittin.5    Smokeless tobacco: Never Used   Substance Use Topics    Alcohol use: No     Comment: none                                 Counseling given: Not Answered      Vital Signs (Current):   Vitals:    20 0611   BP: 136/73   Pulse: 79   Resp: 18   Temp: 98.6 °F (37 °C)   TempSrc: Skin   SpO2: 96%   Weight: 142 lb (64.4 kg)   Height: 5' 8\" (1.727 m)                                              BP Readings from Last 3 Encounters:   20 136/73   20 110/78   20 110/70       NPO Status:                                                                                 BMI:   Wt Readings from Last 3 Encounters:   20 142 lb (64.4 kg)   20 143 lb 9.6 oz (65.1 kg)   20 190 lb (86.2 kg)     Body mass index is 21.59 kg/m².     CBC:   Lab Results   Component Value Date    WBC 6.2 01/15/2020    RBC 4.98 01/15/2020    HGB 14.7 01/15/2020    HCT 44.3 01/15/2020    MCV 89.0 01/15/2020    RDW 13.2 01/15/2020     01/15/2020       CMP:   Lab Results   Component Value Date     2020    K 4.3 2020     2020    CO2 26 2020    BUN 13 2020    CREATININE 0.6 2020    GFRAA >60 2020    AGRATIO 2.1 11/10/2017    LABGLOM >60 2020    LABGLOM 79 06/15/2015    GLUCOSE 96 2020    PROT 6.5 11/10/2017    CALCIUM 9.8 2020    BILITOT 0.6 11/10/2017    ALKPHOS 67 11/10/2017    AST 27 11/10/2017    ALT 18 11/10/2017 POC Tests: No results for input(s): POCGLU, POCNA, POCK, POCCL, POCBUN, POCHEMO, POCHCT in the last 72 hours. Coags:   Lab Results   Component Value Date    PROTIME 11.4 01/15/2020    INR 0.98 01/15/2020    APTT 34.1 01/15/2020       HCG (If Applicable): No results found for: PREGTESTUR, PREGSERUM, HCG, HCGQUANT     ABGs: No results found for: PHART, PO2ART, DRF6EXX, DWB6VKJ, BEART, T9JGJUAA     Type & Screen (If Applicable):  No results found for: LABABO, LABRH    Drug/Infectious Status (If Applicable):  No results found for: HIV, HEPCAB    COVID-19 Screening (If Applicable): No results found for: COVID19      Anesthesia Evaluation  Patient summary reviewed no history of anesthetic complications:   Airway: Mallampati: II  TM distance: >3 FB   Neck ROM: full  Mouth opening: > = 3 FB Dental: normal exam         Pulmonary:                             ROS comment: Lung cancer  60 pack yr smoking   Cardiovascular:    (+) hypertension:, valvular problems/murmurs: MVP,                   Neuro/Psych:   (+) headaches:, depression/anxiety              ROS comment: Aneurysm ([address] cerebellar  GI/Hepatic/Renal:             Endo/Other:                      ROS comment: Pituitary adenoma (HCC) Abdominal:           Vascular:                                        Anesthesia Plan      general     ASA 3       Induction: intravenous. Anesthetic plan and risks discussed with patient.                       Amanda Gilliland MD   12/29/2020

## 2020-12-29 NOTE — OP NOTE
The stereotactic frame was removed uneventfully. The patient was instructed on pin site care and medications. We will schedule an MRI scan in three months.     SPECIMENS REMOVED: None    ESTIMATED BLOOD LOSS: None    TOTAL TIME SPENT WITH PATIENT: In conformance with CMS regulations, I affirm that I was present for the entire neurosurgical portion of the procedure including stereotactic frame placement, review of imaging, discussion with the neuroradiologist, and frame removal.     Chetan Mukherjee MD

## 2021-02-22 ENCOUNTER — HOSPITAL ENCOUNTER (OUTPATIENT)
Dept: MRI IMAGING | Age: 71
Discharge: HOME | End: 2021-02-22
Payer: MEDICARE

## 2021-02-22 DIAGNOSIS — M54.6 THORACIC BACK PAIN, UNSPECIFIED BACK PAIN LATERALITY, UNSPECIFIED CHRONICITY: ICD-10-CM

## 2021-02-22 DIAGNOSIS — C34.91 METASTATIC PRIMARY LUNG CANCER, RIGHT (HCC): ICD-10-CM

## 2021-02-22 DIAGNOSIS — M54.50 LUMBAR BACK PAIN: ICD-10-CM

## 2021-02-22 PROCEDURE — 72158 MRI LUMBAR SPINE W/O & W/DYE: CPT

## 2021-02-22 PROCEDURE — 72157 MRI CHEST SPINE W/O & W/DYE: CPT

## 2021-02-22 PROCEDURE — 2580000003 HC RX 258: Performed by: INTERNAL MEDICINE

## 2021-02-22 PROCEDURE — A9577 INJ MULTIHANCE: HCPCS

## 2021-02-22 PROCEDURE — 6360000004 HC RX CONTRAST MEDICATION

## 2021-02-22 RX ORDER — SODIUM CHLORIDE 0.9 % (FLUSH) 0.9 %
10 SYRINGE (ML) INJECTION ONCE
Status: COMPLETED | OUTPATIENT
Start: 2021-02-22 | End: 2021-02-22

## 2021-02-22 RX ADMIN — Medication 10 ML: at 10:48

## 2021-03-24 ENCOUNTER — HOSPITAL ENCOUNTER (OUTPATIENT)
Dept: MRI IMAGING | Age: 71
Discharge: HOME | End: 2021-03-24
Payer: MEDICARE

## 2021-03-24 DIAGNOSIS — C79.49 SECONDARY MALIGNANT NEOPLASM OF BRAIN AND SPINAL CORD (HCC): ICD-10-CM

## 2021-03-24 DIAGNOSIS — C79.31 SECONDARY MALIGNANT NEOPLASM OF BRAIN AND SPINAL CORD (HCC): ICD-10-CM

## 2021-03-24 PROCEDURE — A9577 INJ MULTIHANCE: HCPCS | Performed by: NEUROLOGICAL SURGERY

## 2021-03-24 PROCEDURE — 70553 MRI BRAIN STEM W/O & W/DYE: CPT

## 2021-03-24 PROCEDURE — 6360000004 HC RX CONTRAST MEDICATION: Performed by: NEUROLOGICAL SURGERY

## 2021-03-24 PROCEDURE — 2580000003 HC RX 258: Performed by: NEUROLOGICAL SURGERY

## 2021-03-24 RX ORDER — SODIUM CHLORIDE 0.9 % (FLUSH) 0.9 %
10 SYRINGE (ML) INJECTION PRN
Status: DISCONTINUED | OUTPATIENT
Start: 2021-03-24 | End: 2021-03-25 | Stop reason: HOSPADM

## 2021-03-24 RX ADMIN — Medication 10 ML: at 10:36

## 2021-03-24 RX ADMIN — GADOBENATE DIMEGLUMINE 13 ML: 529 INJECTION, SOLUTION INTRAVENOUS at 10:36

## 2021-04-07 ENCOUNTER — TELEPHONE (OUTPATIENT)
Dept: PULMONOLOGY | Age: 71
End: 2021-04-07

## 2021-04-16 ENCOUNTER — HOSPITAL ENCOUNTER (OUTPATIENT)
Dept: CT IMAGING | Age: 71
Discharge: HOME | End: 2021-04-16
Payer: MEDICARE

## 2021-04-16 ENCOUNTER — OFFICE VISIT (OUTPATIENT)
Dept: PULMONOLOGY | Age: 71
End: 2021-04-16
Payer: MEDICARE

## 2021-04-16 VITALS
BODY MASS INDEX: 21.22 KG/M2 | SYSTOLIC BLOOD PRESSURE: 154 MMHG | DIASTOLIC BLOOD PRESSURE: 92 MMHG | HEART RATE: 92 BPM | HEIGHT: 68 IN | WEIGHT: 140 LBS | OXYGEN SATURATION: 97 %

## 2021-04-16 DIAGNOSIS — C34.91 METASTATIC PRIMARY LUNG CANCER, RIGHT (HCC): ICD-10-CM

## 2021-04-16 DIAGNOSIS — J93.12 SECONDARY SPONTANEOUS PNEUMOTHORAX: Primary | ICD-10-CM

## 2021-04-16 DIAGNOSIS — R91.8 MASS OF UPPER LOBE OF RIGHT LUNG: ICD-10-CM

## 2021-04-16 PROCEDURE — 1036F TOBACCO NON-USER: CPT | Performed by: INTERNAL MEDICINE

## 2021-04-16 PROCEDURE — 74177 CT ABD & PELVIS W/CONTRAST: CPT

## 2021-04-16 PROCEDURE — 6360000004 HC RX CONTRAST MEDICATION: Performed by: NURSE PRACTITIONER

## 2021-04-16 PROCEDURE — 99214 OFFICE O/P EST MOD 30 MIN: CPT | Performed by: INTERNAL MEDICINE

## 2021-04-16 PROCEDURE — G8427 DOCREV CUR MEDS BY ELIG CLIN: HCPCS | Performed by: INTERNAL MEDICINE

## 2021-04-16 PROCEDURE — G8420 CALC BMI NORM PARAMETERS: HCPCS | Performed by: INTERNAL MEDICINE

## 2021-04-16 PROCEDURE — 4040F PNEUMOC VAC/ADMIN/RCVD: CPT | Performed by: INTERNAL MEDICINE

## 2021-04-16 PROCEDURE — 1090F PRES/ABSN URINE INCON ASSESS: CPT | Performed by: INTERNAL MEDICINE

## 2021-04-16 PROCEDURE — 3017F COLORECTAL CA SCREEN DOC REV: CPT | Performed by: INTERNAL MEDICINE

## 2021-04-16 PROCEDURE — 1123F ACP DISCUSS/DSCN MKR DOCD: CPT | Performed by: INTERNAL MEDICINE

## 2021-04-16 PROCEDURE — G8399 PT W/DXA RESULTS DOCUMENT: HCPCS | Performed by: INTERNAL MEDICINE

## 2021-04-16 RX ADMIN — IOHEXOL 50 ML: 240 INJECTION, SOLUTION INTRATHECAL; INTRAVASCULAR; INTRAVENOUS; ORAL at 06:57

## 2021-04-16 RX ADMIN — IOPAMIDOL 75 ML: 755 INJECTION, SOLUTION INTRAVENOUS at 06:58

## 2021-04-16 NOTE — PROGRESS NOTES
PULMONARY OFFICE FOLLOW UP NOTE    REASON FOR VISIT:   Chief Complaint   Patient presents with    Other     abnormal CT       DATE OF VISIT: 4/16/2021     HISTORY OF PRESENT ILLNESS: 65y.o. year old female was diagnosed to have T2N2 non-small cell lung cancer which has  metastasized comes in to the pulmonary office for follow-up. Patient has been undergoing immunotherapy due to metastatic disease. She has responded well to this therapy. She did report right-sided chest pain which was more neuropathic in characteristics and she underwent nerve block 2 weeks ago. For the last 2 weeks she has been having some increased shortness of breath and right-sided chest heaviness. Symptoms are minimal and have not impacted her day-to-day functioning. This morning she had a surveillance CT chest done which showed right-sided pneumothorax. At the time of interview patient is sitting in chair in no apparent respiratory distress. Reports no significant symptoms. Denies any wheezing, fevers, night sweats. Used to smoke more than 1 pack/day for 40 years before quitting 20 years ago. REVIEW OF SYSTEMS: 14 point ROS is negative beside mentioned in [address]        PAST MEDICAL HISTORY:   Past Medical History:   Diagnosis Date    Aneurysm ([facility]     cerebellar    Cancer ([facility]     NSCLC    Depression     Hypertension     MVP (mitral valve prolapse)     does not follow with cardiology    Pituitary adenoma [facility])        PAST SURGICAL HISTORY:   Past Surgical History:   Procedure Laterality Date    BREAST BIOPSY  1966, 1988    BRONCHOSCOPY N/A 12/6/2019    BRONCHOSCOPY ELECTROMAGNETIC performed by Prince Obregon MD at [address] [place] [address] N/A 12/6/2019    BRONCHOSCOPY W/EBUS FNA performed by Prince Obregon MD at [address] [place] [address]  12/6/2019    BRONCHOSCOPY BIOPSY BRONCHUS performed by Prince Obregon MD at [address] [place] [address] 2019    BRONCHOSCOPY ALVEOLAR LAVAGE performed by Xenia Clark MD at [address]  2019    BRONCHOSCOPY BRUSHINGS performed by Xenia Clark MD at [address] [place] Bilateral 2014. ,     HYSTERECTOMY  1987    MEDIASTINOSCOPY N/A 2020    CERVICAL MEDIASTINOSCOPY WITH LYMPH NODE BIOPSY WITH FROZEN SECTION. 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       SOCIAL HISTORY:   Social History     Tobacco Use    Smoking status: Former Smoker     Packs/day: 1.50     Years: 40.00     Pack years: 60.00     Types: Cigarettes     Quit date: 2007     Years since quittin.8    Smokeless tobacco: Never Used   Substance Use Topics    Alcohol use: No     Comment: none     Drug use: No       FAMILY HISTORY:   Family History   Problem Relation Age of Onset    Cancer Mother 68        bone     Arthritis Father     High Blood Pressure Father     Parkinsonism Father     Colon Cancer Sister 61       MEDICATIONS:     Current Outpatient Medications on File Prior to Visit   Medication Sig Dispense Refill    traMADol (ULTRAM) 50 MG tablet Take 25 mg by mouth every 6 hours as needed for Pain.  methylPREDNISolone (MEDROL DOSEPACK) 4 MG tablet Take 4 mg by mouth See Admin Instructions Take by mouth.       levETIRAcetam (KEPPRA) 500 MG tablet Take 500 mg by mouth 2 times daily      acetaminophen (TYLENOL) 500 MG tablet Take 500 mg by mouth every 6 hours as needed for Pain      atorvastatin (LIPITOR) 20 MG tablet TAKE ONE TABLET BY MOUTH DAILY 90 tablet 3    citalopram (CELEXA) 20 MG tablet TAKE ONE TABLET BY MOUTH DAILY 90 tablet 3    propranolol (INDERAL) 20 MG tablet TAKE ONE TABLET BY MOUTH THREE TIMES A  tablet 3    Durvalumab (IMFINZI IV) Infuse intravenously 1 hr every other week for 1 yr beginning 2020     [address] [place] [address] Labs:    Lab Summary Latest Ref Rng & Units 12/14/2020 11/17/2020   Sodium 136 - 145 mmol/L 138 144   Potassium 3.5 - 5.1 mmol/L 4.3 5.2(H)   BUN 7 - 20 mg/dL 13 15   Creatinine 0.6 - 1.2 mg/dL 0.6 0.6   Glucose 70 - 99 mg/dL 96 103(H)   Calcium 8.3 - 10.6 mg/dL 9.8 10.1   Phosphorus 2.5 - 4.9 mg/dL 4.0 4.3   Albumin 3.4 - 5.0 g/dL 4.3 4.7   HDL cholesterol 40 - 60 mg/dL - 62(H)   Triglycerides 0 - 150 mg/dL - 111   LDL calc <100 mg/dL - 87   VLDL calc Not Established mg/dL - 22   Some recent data might be hidden         IMAGING:    Narrative   EXAMINATION:   CT OF THE CHEST, ABDOMEN, AND PELVIS WITH CONTRAST 4/16/2021 6:50 am           FINDINGS:       Chest:       Mediastinum: The central airways are patent.  There is no hilar or   mediastinal adenopathy.       Lungs/pleura: There has been interval development of a small right   pneumothorax with approximately 2.1 cm separation of apical lung edge from   apical chest wall. .  Soft tissue mass within the right upper lobe extending   to the right hilum measures 3 x 3.6 cm on the current exam compared with 3.1   x 3 cm prior.  The lungs are otherwise clear.  There is no pleural effusion.       Soft Tissues/Bones: There is no acute fracture or aggressive osseous lesion.           Abdomen/Pelvis:       Organs: Small 7 mm hypodense metastasis within the inferior right hepatic   lobe is unchanged from prior.  Several small simple cysts within the left   hepatic lobe are stable in size and appearance from prior.  The remainder of   the solid abdominal organs are unremarkable.       GI/Bowel: There is no bowel dilatation, wall thickening or obstruction. Diverticular changes are noted throughout the left hemicolon.       Pelvis: The uterus is surgically absent.  The bladder is unremarkable.       Peritoneum/Retroperitoneum: There is no free air, free fluid or   intraperitoneal inflammatory change.  There is no adenopathy.       Bones/Soft Tissues:  There is no acute fracture or aggressive osseous lesion.           Impression   1. Small right apical pneumothorax. 2. Slight interval enlargement of the patient's right upper lobe bronchogenic   carcinoma. 3. Stable solitary right inferior hepatic metastasis. Findings were discussed with Dr. Aidan Shahid at 8:15 am on 4/16/2021.               Pulmonary Functions Testing Results:    IMPRESSION AND RECOMMENDATIONS:     1. Secondary spontaneous pneumothorax  -Unclear etiology and timeline for this right-sided small pneumothorax. Possible differentials include secondary spontaneous pneumothorax due to visceral pleural encroachment of the right upper lobe lung mass or iatrogenic due to nerve block done 2 weeks ago. -Patient has minimal symptoms and is not in any respiratory distress or requiring oxygen supplementation.  -She is hesitant to undergo any invasive procedure today.  -I will order for repeat CT chest in 5 days time to assess for any changes to this right apical pneumothorax. -If the pneumothorax has increased in size or patient has developed more symptoms, she may benefit from a percutaneous thoracostomy procedure to drain this pneumothorax. She may need a short hospitalization at that time. Patient is agreeable with this plan. -Repeat CT chest without contrast on 4/21/2021.    2. Mass of upper lobe of right lung  -Right upper lobe lung mass is proven to be adenocarcinoma which is metastatic. Patient is currently undergoing immunotherapy. Lung mass has increased in size since the last imaging done at in 2020.  -Metastatic lesion in the liver and the brain have resolved. 3. Metastatic primary lung cancer, right ([address]  -Management as per medical oncology. Return in about 5 days (around 4/21/2021). Ceasar Wang MD  Pulmonary Critical Care and Sleep Medicine  4/16/2021, 11:31 AM    This note was completed using dragon medical speech recognition software.  Grammatical errors, random word insertions, pronoun errors and incomplete sentences are occasional consequences of this technology due to software limitations. If there are questions or concerns about the content of this note of information contained within the body of this dictation they should be addressed with the provider for clarification.

## 2021-04-21 ENCOUNTER — HOSPITAL ENCOUNTER (OUTPATIENT)
Dept: CT IMAGING | Age: 71
Discharge: HOME | End: 2021-04-21
Payer: MEDICARE

## 2021-04-21 ENCOUNTER — OFFICE VISIT (OUTPATIENT)
Dept: PULMONOLOGY | Age: 71
End: 2021-04-21
Payer: MEDICARE

## 2021-04-21 VITALS — HEART RATE: 88 BPM | DIASTOLIC BLOOD PRESSURE: 78 MMHG | SYSTOLIC BLOOD PRESSURE: 126 MMHG | OXYGEN SATURATION: 97 %

## 2021-04-21 DIAGNOSIS — J93.12 SECONDARY SPONTANEOUS PNEUMOTHORAX: ICD-10-CM

## 2021-04-21 DIAGNOSIS — C34.91 METASTATIC PRIMARY LUNG CANCER, RIGHT (HCC): ICD-10-CM

## 2021-04-21 DIAGNOSIS — J93.9 PNEUMOTHORAX ON RIGHT: Primary | ICD-10-CM

## 2021-04-21 PROCEDURE — 1123F ACP DISCUSS/DSCN MKR DOCD: CPT | Performed by: INTERNAL MEDICINE

## 2021-04-21 PROCEDURE — 3017F COLORECTAL CA SCREEN DOC REV: CPT | Performed by: INTERNAL MEDICINE

## 2021-04-21 PROCEDURE — 4040F PNEUMOC VAC/ADMIN/RCVD: CPT | Performed by: INTERNAL MEDICINE

## 2021-04-21 PROCEDURE — G8399 PT W/DXA RESULTS DOCUMENT: HCPCS | Performed by: INTERNAL MEDICINE

## 2021-04-21 PROCEDURE — 1036F TOBACCO NON-USER: CPT | Performed by: INTERNAL MEDICINE

## 2021-04-21 PROCEDURE — G8427 DOCREV CUR MEDS BY ELIG CLIN: HCPCS | Performed by: INTERNAL MEDICINE

## 2021-04-21 PROCEDURE — 1090F PRES/ABSN URINE INCON ASSESS: CPT | Performed by: INTERNAL MEDICINE

## 2021-04-21 PROCEDURE — 71250 CT THORAX DX C-: CPT

## 2021-04-21 PROCEDURE — 99214 OFFICE O/P EST MOD 30 MIN: CPT | Performed by: INTERNAL MEDICINE

## 2021-04-21 PROCEDURE — G8420 CALC BMI NORM PARAMETERS: HCPCS | Performed by: INTERNAL MEDICINE

## 2021-04-21 RX ORDER — HYDROCODONE BITARTRATE AND ACETAMINOPHEN 5; 325 MG/1; MG/1
1 TABLET ORAL EVERY 4 HOURS PRN
COMMUNITY
End: 2021-05-17 | Stop reason: ALTCHOICE

## 2021-04-21 RX ORDER — GABAPENTIN 300 MG/1
300 CAPSULE ORAL 3 TIMES DAILY
COMMUNITY

## 2021-04-21 NOTE — PROGRESS NOTES
PULMONARY OFFICE FOLLOW UP NOTE    REASON FOR VISIT:   Chief Complaint   Patient presents with    Abnormal CT     had her repeat CT this morning       DATE OF VISIT: 4/21/2021    HISTORY OF PRESENT ILLNESS: 65y.o. year old female was diagnosed to have T2N2 non-small cell lung cancer which has  metastasized comes in to the pulmonary office for follow-up. Patient has been undergoing immunotherapy due to metastatic disease. She has responded well to this therapy. She did report right-sided chest pain which was more neuropathic in characteristics and she underwent nerve block 2 weeks ago after which she started noticing some increased shortness of breath and right-sided chest heaviness. Symptoms are minimal and have not impacted her day-to-day functioning. On April, 16 2020 she had a surveillance CT chest done which showed right-sided pneumothorax. Patient was managed conservatively. She comes for a follow up. Re[eat CT chest from today showed decrease in size of right pneumothorax.      At the time of interview patient is sitting in chair in no apparent respiratory distress. Reports no significant symptoms. Denies any wheezing, fevers, night sweats. Used to smoke more than 1 pack/day for 40 years before quitting 20 years ago. REVIEW OF SYSTEMS:  CONSTITUTIONAL SYMPTOMS: The patient denies fever, fatigue, night sweats, weight loss or weight gain. HEENT: No vision changes. No tinnitus, Denies sinus pain. No hoarseness, or dysphagia. NECK: Patient denies swelling in the neck. CARDIOVASCULAR: Denies chest pain, palpitation, syncope. RESPIRATORY: Denies shortness of breath or cough. GASTROINTESTINAL: Denies nausea, abdominal pain or change in bowel function. GENITOURINARY: Denies obstructive symptoms. No history of incontinence. BREASTS: No masses or lumps in the breasts. SKIN: No rashes or itching. MUSCULOSKELETAL: Denies weakness or bone pain. NEUROLOGICAL: No headaches or seizures. PSYCHIATRIC: Denies mood swings or depression. ENDOCRINE: Denies heat or cold intolerance or excessive thirst.  HEMATOLOGIC/LYMPHATIC: Denies easy bruising or lymph node swelling. ALLERGIC/IMMUNOLOGIC: No environmental allergies. PAST MEDICAL HISTORY:   Past Medical History:   Diagnosis Date    [facility]neur[facility]m [facility]     cerebellar    Cancer ([facility]     NSCLC    Depression     Hypertension     MVP (mitral valve prolapse)     does not follow with cardiology    Pituitary adenoma [facility])        PAST SURGICAL HISTORY:   Past Surgical History:   Procedure Laterality Date    BREAST BIOPSY  1988    BRONCHOSCOPY N/A 2019    [facility] N[facility]    BRONCHOSCOPY W/EBUS FNA performed by Umberto Singer MD at [facility]  2019    BRONCHOSCOPY BIOPSY BRONCHUS performed by Umberto Singer MD at [facility]  2019    BRONCHOSCOPY ALVEOLAR LAVAGE performed by Umberto Singer MD at [facility]  2019    BRONCHOSCOPY BRUSHINGS performed by Umberto Singer MD at [facility] Bilateral 2009,    [facility]    MEDIASTINOSCOPY N/A 2020    CERVICAL MEDIASTINOSCOPY WITH LYMPH NODE BIOPSY WITH FROZEN SECTION.  [facility]       SOCIAL HISTORY:   Social History     Tobacco Use    Smoking status: Former Smoker     Packs/day: 1.50     Years: 40.00     Pack years: 60.00     Types: Cigarettes     Quit date: 2007     Years since quittin.8    Smokeless tobacco: Never Used   Substance Use Topics    Alcohol use: No     Comment: none     Drug use: No       FAMILY HISTORY:   Family History   Problem Relation Age of Onset    Cancer Mother 68        bone     Arthritis Father     High Blood Pressure Father     Parkinsonism Father     Colon Cancer Sister 61       MEDICATIONS:     Current Outpatient Medications on File Prior to Visit   Medication Sig Dispense Refill    HYDROcodone-acetaminophen (NORCO) 5-325 MG per tablet Take 1 tablet by mouth every 4 hours as needed for Pain.  gabapentin (NEURONTIN) 300 MG capsule Take 300 mg by mouth 3 times daily.  traMADol (ULTRAM) 50 MG tablet Take 25 mg by mouth every 6 hours as needed for Pain.  methylPREDNISolone (MEDROL DOSEPACK) 4 MG tablet Take 4 mg by mouth See Admin Instructions Take by mouth.  levETIRAcetam (KEPPRA) 500 MG tablet Take 500 mg by mouth 2 times daily      atorvastatin (LIPITOR) 20 MG tablet TAKE ONE TABLET BY MOUTH DAILY 90 tablet 3    citalopram (CELEXA) 20 MG tablet TAKE ONE TABLET BY MOUTH DAILY 90 tablet 3    propranolol (INDERAL) 20 MG tablet TAKE ONE TABLET BY MOUTH THREE TIMES A  tablet 3    Durvalumab (IMFINZI IV) Infuse intravenously 1 hr every other week for 1 yr beginning April 9, 2020      calcium carbonate (OSCAL) 500 MG TABS tablet Take 500 mg by mouth daily      magnesium gluconate (MAGONATE) 500 MG tablet Take 250 mg by mouth daily as needed      Multiple Vitamins-Minerals (THERAPEUTIC MULTIVITAMIN-MINERALS) tablet Take 1 tablet by mouth daily      vitamin C (ASCORBIC ACID) 500 MG tablet Take 1,000 mg by mouth daily      Psyllium (METAMUCIL FIBER PO) Take 3 capsules by mouth daily       Phenylephrine-Aspirin (EVITA-SELTZER PLUS SINUS PO) Take 2 tablets by mouth as needed. No current facility-administered medications on file prior to visit. ALLERGIES:   Allergies as of 04/21/2021 - Review Complete 04/21/2021   Allergen Reaction Noted    Nsaids  06/06/2014      OBJECTIVE:   blood pressure is 126/78 and her pulse is 88. Her oxygen saturation is 97%.        PHYSICAL EXAM:    CONSTITUTIONAL: She is a 65y.o.-year-old who appears her stated age. She is alert and oriented x 3 and in no acute distress. HEENT: PERRL. No scleral icterus. No thrush, atraumatic, normocephalic. NECK: Supple, without cervical or supraclavicular lymphadenopathy:  CARDIOVASCULAR: S1 S2 RRR. Without murmer  RESPIRATORY & CHEST: Lungs are clear to auscultation and percussion. No wheezing, no crackles. Good air movement  GASTROINTESTINAL & ABDOMEN: Soft, nontender, positive bowel sounds in all quadrants, non-distended, without hepatosplenomegaly. GENITOURINARY: Deferred. MUSCULOSKELETAL: No tenderness to palpation of the axial skeleton. There is no clubbing. No cyanosis. No edema of the lower extremities. SKIN OF BODY: No rash or jaundice. PSYCHIATRIC EVALUATION: Normal affect. Patient answers questions appropriately. HEMATOLOGIC/LYMPHATIC/ IMMUNOLOGIC: No palpable lymphadenopathy. NEUROLOGIC: Alert and oriented x 3. Groslly non-focal. Motor strength is 5+/5 in all muscle groups. The patient has a normal sensorium globally. ASSESSMENT AND PLAN:     1. Secondary spontaneous pneumothorax on right  -Unclear etiology and timeline for this right-sided small pneumothorax. Possible differentials include secondary spontaneous pneumothorax due to visceral pleural encroachment of the right upper lobe lung mass or iatrogenic due to nerve block done few weeks ago. -Repeat CT from today showed improvement in right pneumothorax with conservative management.   -Patient has minimal symptoms and is not in any respiratory distress or requiring oxygen supplementation.  -Pneumothorax will likely resolve over time on it own. I have advised the patient to seek medical attention if she develops shortness of breath, chest pain or chest pressure. 2. Metastatic primary lung cancer, right ([address]  -Right upper lobe lung mass is proven to be adenocarcinoma which is metastatic. Patient is currently undergoing immunotherapy.   Lung mass has increased in size since the last imaging done at in 2020.  -Metastatic lesion in the liver and the brain have resolved. Follow up in 3 months      Liliya Morrow MD  Pulmonary Critical Care and Sleep Medicine  Electronically signed by Liliya Morrow MD on 4/21/2021 at 12:07 PM     This note was completed using dragon medical speech recognition software. Grammatical errors, random word insertions, pronoun errors and incomplete sentences are occasional consequences of this technology due to software limitations. If there are questions or concerns about the content of this note of information contained within the body of this dictation they should be addressed with the provider for clarification.

## 2021-05-03 NOTE — PROGRESS NOTES
PATIENT REACHED   YES____NO__X__    PREOP INSTRUCTIONS LEFT ON VM [phone]______________      DATE__5/4/21_______ TIME__1015_______ARRIVAL_0915_______PLACE__masc__________  NOTHING TO EAT OR DRINK  AFTER MIDNIGHT THE EVENING PRIOR OR AS INSTRUCTED BY YOUR DR.  Kathia Doshi NEED A RESPONSIBLE ADULT AGE 18 OR OLDER TO DRIVE YOU HOME  PLEASE BRING INSURANCE CARD. PICTURE ID AND COMPLETE LIST OF MEDS  WEAR LOOSE COMFORTABLE CLOTHING  FOLLOW ANY INSTRUCTIONS YOUR DRS OFFICE HAS GIVEN YOU,INCLUDING WHAT MEDICATIONS TO TAKE THE AM OF PROCEDURE AND WHEN AND IF YOU NEED TO STOP ANY BLOOD THINNERS. IF YOU HAVE QUESTIONS REGARDING THIS CALL THE OFFICE  THE GOAL BLOOD SUGAR THE AM OF PROCEDURE  OR LESS ABOVE THAT THE PROCEDURE MAY BE CANCELLED  ANY QUESTIONS CALL YOUR DOCTOR. ALSO,PLEASE READ THE INSTRUCTION PACKET FROM YOUR DR IF YOU RECEIVED ONE. SPINE INTERVENTION NUMBER [phone]      OTHER___________________________________      VISITOR POLICY(subject to change)      There is a one visitor policy at [facility] for all surgeries and endoscopies. Whether the visitor can stay or will be asked to wait in the car will depend on the current policy and if social distancing can be maintained. The policy is subject to change at any time. Please make sure the visitor has a cell phone that is on,charged and able to accept calls, as this may be the way that the staff communicates with them. Pain management is NO VISITOR policyThe patients ride is expected to remain in the car with a cell phone for communication. If the ride is leaving the hospital grounds please make sure they are back in time for pickup. Have the patient inform the staff on arrival what their rides plans are while the patient is in the facility. At the MAIN there is one visitor allowed. Please note that the visitor policy is subject to change.

## 2021-05-04 ENCOUNTER — APPOINTMENT (OUTPATIENT)
Dept: GENERAL RADIOLOGY | Age: 71
End: 2021-05-04
Attending: ANESTHESIOLOGY
Payer: MEDICARE

## 2021-05-04 ENCOUNTER — HOSPITAL ENCOUNTER (OUTPATIENT)
Age: 71
Setting detail: OUTPATIENT SURGERY
Discharge: HOME | End: 2021-05-04
Attending: ANESTHESIOLOGY | Admitting: ANESTHESIOLOGY
Payer: MEDICARE

## 2021-05-04 VITALS
HEART RATE: 80 BPM | DIASTOLIC BLOOD PRESSURE: 68 MMHG | TEMPERATURE: 98.9 F | SYSTOLIC BLOOD PRESSURE: 139 MMHG | OXYGEN SATURATION: 96 % | RESPIRATION RATE: 14 BRPM

## 2021-05-04 PROCEDURE — 99152 MOD SED SAME PHYS/QHP 5/>YRS: CPT | Performed by: ANESTHESIOLOGY

## 2021-05-04 PROCEDURE — 3610000059 HC PAIN LEVEL 5 ADDL 15 MIN (NON-OR): Performed by: ANESTHESIOLOGY

## 2021-05-04 PROCEDURE — 3610000058 HC PAIN LEVEL 5 BASE (NON-OR): Performed by: ANESTHESIOLOGY

## 2021-05-04 PROCEDURE — 3209999900 FLUORO FOR SURGICAL PROCEDURES

## 2021-05-04 PROCEDURE — 2500000003 HC RX 250 WO HCPCS: Performed by: ANESTHESIOLOGY

## 2021-05-04 PROCEDURE — 6360000002 HC RX W HCPCS: Performed by: ANESTHESIOLOGY

## 2021-05-04 PROCEDURE — 99153 MOD SED SAME PHYS/QHP EA: CPT | Performed by: ANESTHESIOLOGY

## 2021-05-04 PROCEDURE — 2709999900 HC NON-CHARGEABLE SUPPLY: Performed by: ANESTHESIOLOGY

## 2021-05-04 RX ORDER — BUPIVACAINE HYDROCHLORIDE 2.5 MG/ML
INJECTION, SOLUTION INFILTRATION; PERINEURAL
Status: COMPLETED | OUTPATIENT
Start: 2021-05-04 | End: 2021-05-04

## 2021-05-04 RX ORDER — FENTANYL CITRATE 50 UG/ML
INJECTION, SOLUTION INTRAMUSCULAR; INTRAVENOUS
Status: COMPLETED | OUTPATIENT
Start: 2021-05-04 | End: 2021-05-04

## 2021-05-04 RX ORDER — MIDAZOLAM HYDROCHLORIDE 1 MG/ML
INJECTION INTRAMUSCULAR; INTRAVENOUS
Status: COMPLETED | OUTPATIENT
Start: 2021-05-04 | End: 2021-05-04

## 2021-05-04 RX ORDER — LIDOCAINE HYDROCHLORIDE 10 MG/ML
INJECTION, SOLUTION EPIDURAL; INFILTRATION; INTRACAUDAL; PERINEURAL
Status: COMPLETED | OUTPATIENT
Start: 2021-05-04 | End: 2021-05-04

## 2021-05-04 ASSESSMENT — PAIN - FUNCTIONAL ASSESSMENT: PAIN_FUNCTIONAL_ASSESSMENT: 0-10

## 2021-05-04 ASSESSMENT — PAIN SCALES - GENERAL: PAINLEVEL_OUTOF10: 0

## 2021-05-04 ASSESSMENT — PAIN DESCRIPTION - DESCRIPTORS: DESCRIPTORS: SHARP;CONSTANT;ACHING

## 2021-05-05 NOTE — H&P
Update History & Physical    The patient's History and Physical of April 26,  was reviewed with the patient and I examined the patient. There was no change. The surgical site was confirmed by the patient and me. Plan: The risks, benefits, expected outcome, and alternative to the recommended procedure have been discussed with the patient. Patient understands and wants to proceed with the procedure.      Electronically signed by Jonathan Jesus MD on 5/5/2021 at 3:21 PM

## 2021-05-05 NOTE — OP NOTE
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 After obtaining written informed consent, the patient had a 20 g IV placed, and NS was running at 30 ml/hour, the patient was placed in the supine position with the leg slightly flexed. Versed and Fentanyl was given to the patient intravenous, a NC at 4 l was placed and monitors attached. Pre-procedure blood pressure and pulse were stable and recorded in patients clinic chart. Medical Necessity:  The patient has a history of chest pain for the last 2 years, was not able to get any relief from the oral medications, she still continue to have pain in between the ribs, but now the only I can do the block with a minimal risk for injury to the lung is to do the block with ultrasound guidance    PROCEDURE NOTE:  After obtaining written informed consent patient was taken to the procedure room. Pre-procedure blood pressure and pulse were stable and recorded in patients clinic chart. The patient was placed in a prone position and the Right intercostals space Rib 9, 10, 11 were identified about 1 inch from midline thoracic spine, the area was prepped with chloraprep and draped in the usual sterile fashion.   The skin over the entry point was scanned using the Sono Site ultrasound machine at the 9th rib, after identifying the best access to the intersocal space via ultrasound a 25-gauge, 1.5-inch needle was inserted into the intercostals muscle under the ultrasound guidance at all time to avoid lung injury. Following placement of the needle and negative aspiration a total of 2ml of 0.25% Bupivacaine    Following placement of the needle sensory stimulation at 50 Hz and motor stimulation at 2 Hz was carried out. After confirmation of needle placement by the patient reporting reproduction of pain or sensation in the area of symptoms and denying extremity motor sensations RFL was carried out in lesion mode. The settings were 80 °C, and 180 seconds for lesion mode at each level. The identical procedure was performed on T10 and T11    The patient tolerated the procedure well. The needle was flushed and withdrawn, and a Band-Aid was applied. Following the procedure the patients vital signs were stable.  The patient was discharged home in good condition after being given discharge instructions     Electronically signed by Viola Mason MD on 5/5/2021 at 3:19 PM

## 2021-05-12 ENCOUNTER — HOSPITAL ENCOUNTER (OUTPATIENT)
Age: 71
Discharge: HOME | End: 2021-05-12
Payer: MEDICARE

## 2021-05-12 ENCOUNTER — HOSPITAL ENCOUNTER (OUTPATIENT)
Dept: GENERAL RADIOLOGY | Age: 71
Discharge: HOME | End: 2021-05-12
Payer: MEDICARE

## 2021-05-12 DIAGNOSIS — C34.91 METASTATIC PRIMARY LUNG CANCER, RIGHT (HCC): ICD-10-CM

## 2021-05-12 DIAGNOSIS — J93.9 PNEUMOTHORAX, UNSPECIFIED TYPE: ICD-10-CM

## 2021-05-12 PROCEDURE — 71048 X-RAY EXAM CHEST 4+ VIEWS: CPT

## 2021-05-17 ENCOUNTER — OFFICE VISIT (OUTPATIENT)
Dept: INTERNAL MEDICINE CLINIC | Age: 71
End: 2021-05-17
Payer: MEDICARE

## 2021-05-17 VITALS
DIASTOLIC BLOOD PRESSURE: 64 MMHG | HEART RATE: 84 BPM | SYSTOLIC BLOOD PRESSURE: 124 MMHG | HEIGHT: 68 IN | WEIGHT: 133 LBS | BODY MASS INDEX: 20.16 KG/M2

## 2021-05-17 DIAGNOSIS — E78.00 PURE HYPERCHOLESTEROLEMIA: ICD-10-CM

## 2021-05-17 DIAGNOSIS — C34.91 METASTATIC PRIMARY LUNG CANCER, RIGHT (HCC): ICD-10-CM

## 2021-05-17 DIAGNOSIS — F41.9 ANXIETY: ICD-10-CM

## 2021-05-17 DIAGNOSIS — I10 ESSENTIAL HYPERTENSION, BENIGN: Primary | ICD-10-CM

## 2021-05-17 PROCEDURE — 1090F PRES/ABSN URINE INCON ASSESS: CPT | Performed by: INTERNAL MEDICINE

## 2021-05-17 PROCEDURE — 1036F TOBACCO NON-USER: CPT | Performed by: INTERNAL MEDICINE

## 2021-05-17 PROCEDURE — 99214 OFFICE O/P EST MOD 30 MIN: CPT | Performed by: INTERNAL MEDICINE

## 2021-05-17 PROCEDURE — G8420 CALC BMI NORM PARAMETERS: HCPCS | Performed by: INTERNAL MEDICINE

## 2021-05-17 PROCEDURE — G8427 DOCREV CUR MEDS BY ELIG CLIN: HCPCS | Performed by: INTERNAL MEDICINE

## 2021-05-17 PROCEDURE — 4040F PNEUMOC VAC/ADMIN/RCVD: CPT | Performed by: INTERNAL MEDICINE

## 2021-05-17 PROCEDURE — 1123F ACP DISCUSS/DSCN MKR DOCD: CPT | Performed by: INTERNAL MEDICINE

## 2021-05-17 PROCEDURE — G8399 PT W/DXA RESULTS DOCUMENT: HCPCS | Performed by: INTERNAL MEDICINE

## 2021-05-17 PROCEDURE — 3017F COLORECTAL CA SCREEN DOC REV: CPT | Performed by: INTERNAL MEDICINE

## 2021-05-17 RX ORDER — POLYETHYLENE GLYCOL 3350 17 G/17G
17 POWDER, FOR SOLUTION ORAL DAILY PRN
COMMUNITY

## 2021-05-17 RX ORDER — CITALOPRAM 20 MG/1
30 TABLET ORAL DAILY
Qty: 135 TABLET | Refills: 1 | Status: SHIPPED | OUTPATIENT
Start: 2021-05-17 | End: 2021-11-22 | Stop reason: SDUPTHER

## 2021-05-17 RX ORDER — OXYCODONE HYDROCHLORIDE AND ACETAMINOPHEN 5; 325 MG/1; MG/1
1 TABLET ORAL EVERY 4 HOURS PRN
COMMUNITY
End: 2021-07-14

## 2021-05-17 SDOH — ECONOMIC STABILITY: FOOD INSECURITY: WITHIN THE PAST 12 MONTHS, THE FOOD YOU BOUGHT JUST DIDN'T LAST AND YOU DIDN'T HAVE MONEY TO GET MORE.: NEVER TRUE

## 2021-05-17 SDOH — ECONOMIC STABILITY: FOOD INSECURITY: WITHIN THE PAST 12 MONTHS, YOU WORRIED THAT YOUR FOOD WOULD RUN OUT BEFORE YOU GOT MONEY TO BUY MORE.: NEVER TRUE

## 2021-05-17 ASSESSMENT — PATIENT HEALTH QUESTIONNAIRE - PHQ9
SUM OF ALL RESPONSES TO PHQ QUESTIONS 1-9: 2
SUM OF ALL RESPONSES TO PHQ9 QUESTIONS 1 & 2: 2
SUM OF ALL RESPONSES TO PHQ QUESTIONS 1-9: 2
1. LITTLE INTEREST OR PLEASURE IN DOING THINGS: 1

## 2021-05-17 ASSESSMENT — SOCIAL DETERMINANTS OF HEALTH (SDOH): HOW HARD IS IT FOR YOU TO PAY FOR THE VERY BASICS LIKE FOOD, HOUSING, MEDICAL CARE, AND HEATING?: NOT HARD AT ALL

## 2021-05-17 NOTE — PROGRESS NOTES
Chief Complaint   Patient presents with    Hypertension    Hyperlipidemia    Anxiety        HTN: The patient is tolerating blood pressure medication well and taking as directed. BP control outside of the office is reported as not monitored. No symptoms concerning for end organ damage are present. HLD: She continues to take atorvastatin daily. She tolerates this well. Anxiety: She is taking citalopram for this chronic condition. She uses alprazolam infrequently when her anxiety is more active. Symptoms have been worse due to COVID and cancer. Lung ca- she has completed a year of Imfinzi. Sees Dr. Rangel Zavala. She reports she is doing well.                Social History     Tobacco Use    Smoking status: Former Smoker     Packs/day: 1.50     Years: 40.00     Pack years: 60.00     Types: Cigarettes     Quit date: 2007     Years since quittin.9    Smokeless tobacco: Never Used   Vaping Use    Vaping Use: Never used   Substance Use Topics    Alcohol use: No     Comment: none     Drug use: No        ROS:  Neg for cough or wheezing  Neg for dyspnea  She has a BM every other day             EXAM:  /64   Pulse 84   Ht 5' 8\" (1.727 m)   Wt 133 lb (60.3 kg)   BMI 20.22 kg/m²    GEN: WN/WD, NAD  CV: regular rate and rhythm, no murmurs rubs or gallops  Resp: normal effort, clear auscultation bilaterally  No peripheral edema                Lab Results   Component Value Date    CREATININE 0.6 2020    BUN 13 2020     2020    K 4.3 2020     2020    CO2 26 2020      Lab Results   Component Value Date    CHOL 171 2020    CHOL 170 2019    CHOL 183 2017     Lab Results   Component Value Date    TRIG 111 2020    TRIG 113 2019    TRIG 122 2017     Lab Results   Component Value Date    HDL 62 (H) 2020    HDL 65 (H) 2019    HDL 65 (H) 2018     Lab Results   Component Value Date    LDLCALC 87 11/17/2020    LDLCALC 82 03/14/2019    LDLCALC 79 03/09/2018     Lab Results   Component Value Date    LABVLDL 22 11/17/2020    LABVLDL 23 03/14/2019    LABVLDL 28 03/09/2018     Lab Results   Component Value Date    CHOLHDLRATIO 2.6 06/15/2015      A/P  1. Essential hypertension, benign  Blood pressure control is excellent. Blood testing is up-to-date. Continue propranolol. 2. Pure hypercholesterolemia  Chronic and well controlled. Continue atorvastatin. 3. Anxiety  Chronic with suboptimal control. We discussed potential risk of combining opioids and benzodiazepines. She will avoid combining these medications. Increase citalopram to 30 mg daily. 4. Metastatic primary lung cancer, right ([address]  This is chronic and stable. There are no acute issues at this time. She will continue follow-up and management with Dr. Matias Dietz.           RTO 6 months

## 2021-05-27 ENCOUNTER — HOSPITAL ENCOUNTER (OUTPATIENT)
Dept: GENERAL RADIOLOGY | Age: 71
Discharge: HOME | End: 2021-05-27
Payer: MEDICARE

## 2021-05-27 ENCOUNTER — HOSPITAL ENCOUNTER (OUTPATIENT)
Age: 71
Discharge: HOME | End: 2021-05-27
Payer: MEDICARE

## 2021-05-27 DIAGNOSIS — M54.14 RADICULOPATHY OF THORACIC REGION: ICD-10-CM

## 2021-05-27 PROCEDURE — 71101 X-RAY EXAM UNILAT RIBS/CHEST: CPT

## 2021-05-27 PROCEDURE — 72070 X-RAY EXAM THORAC SPINE 2VWS: CPT

## 2021-06-29 ENCOUNTER — TELEPHONE (OUTPATIENT)
Dept: INTERNAL MEDICINE CLINIC | Age: 71
End: 2021-06-29

## 2021-06-29 NOTE — TELEPHONE ENCOUNTER
Pain related to shingles as somewhat unlikely since she has had the vaccine, but some people do have long-lasting pain following shingles. I would recommend she have an appointment to address this and determine the best next step.

## 2021-06-29 NOTE — TELEPHONE ENCOUNTER
----- Message from Sigifredo Russo sent at 6/29/2021 10:18 AM EDT -----  Subject: Message to Provider    QUESTIONS  Information for Provider? The patient has been experiencing pain for   several month. The patient states that she has radiating pain from front   to back. The patient is concerned that she may have an internal shingles   virus infection. Is this possible? Is there testing that can be done to   rule out Shingles virus? Please call the patient to advise.  ---------------------------------------------------------------------------  --------------  CALL BACK INFO  What is the best way for the office to contact you? OK to leave message on   voicemail  Preferred Call Back Phone Number? [phone]  ---------------------------------------------------------------------------  --------------  SCRIPT ANSWERS  Relationship to Patient?  Self

## 2021-06-29 NOTE — TELEPHONE ENCOUNTER
Pain is located underneath her armpit and radiates to right up under her rib. Started around thanksgiving of last year. She has had all 3 shingles vaccines. Tried ice packs, rachel vergara, aspercreme. Just not sure what to do and gets no relief. Wants to know if there is something else she can try or will she need to be seen.

## 2021-06-30 ENCOUNTER — OFFICE VISIT (OUTPATIENT)
Dept: INTERNAL MEDICINE CLINIC | Age: 71
End: 2021-06-30
Payer: MEDICARE

## 2021-06-30 VITALS
BODY MASS INDEX: 19.31 KG/M2 | WEIGHT: 127 LBS | SYSTOLIC BLOOD PRESSURE: 124 MMHG | DIASTOLIC BLOOD PRESSURE: 70 MMHG | HEART RATE: 98 BPM | OXYGEN SATURATION: 97 %

## 2021-06-30 DIAGNOSIS — C34.91 METASTATIC PRIMARY LUNG CANCER, RIGHT (HCC): ICD-10-CM

## 2021-06-30 DIAGNOSIS — M54.50 ACUTE RIGHT-SIDED LOW BACK PAIN WITHOUT SCIATICA: Primary | ICD-10-CM

## 2021-06-30 PROCEDURE — 4040F PNEUMOC VAC/ADMIN/RCVD: CPT | Performed by: NURSE PRACTITIONER

## 2021-06-30 PROCEDURE — G8420 CALC BMI NORM PARAMETERS: HCPCS | Performed by: NURSE PRACTITIONER

## 2021-06-30 PROCEDURE — 1090F PRES/ABSN URINE INCON ASSESS: CPT | Performed by: NURSE PRACTITIONER

## 2021-06-30 PROCEDURE — 99214 OFFICE O/P EST MOD 30 MIN: CPT | Performed by: NURSE PRACTITIONER

## 2021-06-30 PROCEDURE — G8427 DOCREV CUR MEDS BY ELIG CLIN: HCPCS | Performed by: NURSE PRACTITIONER

## 2021-06-30 PROCEDURE — 1123F ACP DISCUSS/DSCN MKR DOCD: CPT | Performed by: NURSE PRACTITIONER

## 2021-06-30 PROCEDURE — 3017F COLORECTAL CA SCREEN DOC REV: CPT | Performed by: NURSE PRACTITIONER

## 2021-06-30 PROCEDURE — 1036F TOBACCO NON-USER: CPT | Performed by: NURSE PRACTITIONER

## 2021-06-30 PROCEDURE — G8399 PT W/DXA RESULTS DOCUMENT: HCPCS | Performed by: NURSE PRACTITIONER

## 2021-06-30 RX ORDER — VALACYCLOVIR HYDROCHLORIDE 1 G/1
1000 TABLET, FILM COATED ORAL 3 TIMES DAILY
Qty: 21 TABLET | Refills: 0 | Status: SHIPPED | OUTPATIENT
Start: 2021-06-30 | End: 2021-07-07

## 2021-06-30 NOTE — PROGRESS NOTES
6/30/21     Chief Complaint   Patient presents with    Pain     Under right scapula - ongoing for months    Rash     back right side over the ongoing pain listed above- just noticed rash a couple days ago though     Other     AWV scheduled for 11/2021 with PCP     HPI     Pt is having back pain (right scapula and radiating to right side) that has been ongoing since November - has been seeing Dr. Xochitl Salazar and seeing Dr. Damir Vasquez - planning on doing a pet scan. Pain is getting worse. Taking oxycodone and gabapentin with little relief. Tried aspercream roll on - has trouble reaching. Niece noticed a very light rash to the area a couple days ago. Pt is not sure if she had a rash previously, as she cannot see the area. Denies drainage. Concern for shingles. Allergies   Allergen Reactions    Nsaids      Causes itching and swelling     Current Outpatient Medications   Medication Sig Dispense Refill    valACYclovir (VALTREX) 1 g tablet Take 1 tablet by mouth 3 times daily for 7 days 21 tablet 0    oxyCODONE-acetaminophen (PERCOCET) 5-325 MG per tablet Take 1 tablet by mouth every 4 hours as needed for Pain.  polyethylene glycol (GLYCOLAX) 17 g packet Take 17 g by mouth daily as needed for Constipation      citalopram (CELEXA) 20 MG tablet Take 1.5 tablets by mouth daily 135 tablet 1    gabapentin (NEURONTIN) 300 MG capsule Take 300 mg by mouth 3 times daily.  atorvastatin (LIPITOR) 20 MG tablet TAKE ONE TABLET BY MOUTH DAILY 90 tablet 3    propranolol (INDERAL) 20 MG tablet TAKE ONE TABLET BY MOUTH THREE TIMES A  tablet 3    calcium carbonate (OSCAL) 500 MG TABS tablet Take 500 mg by mouth daily      Multiple Vitamins-Minerals (THERAPEUTIC MULTIVITAMIN-MINERALS) tablet Take 1 tablet by mouth daily      vitamin C (ASCORBIC ACID) 500 MG tablet Take 1,000 mg by mouth daily      Phenylephrine-Aspirin (EVITA-SELTZER PLUS SINUS PO) Take 2 tablets by mouth as needed.        No current facility-administered medications for this visit. Review of Systems  Negative other than HPI    Vitals:    06/30/21 0924   BP: 124/70   Pulse: 98   SpO2: 97%   Weight: 127 lb (57.6 kg)      Physical Exam  Constitutional:       General: She is not in acute distress. Appearance: Normal appearance. She is not ill-appearing. HENT:      Head: Normocephalic and atraumatic. Pulmonary:      Effort: Pulmonary effort is normal. No respiratory distress. Skin:     General: Skin is warm and dry. Comments: Scattered erythematous spots, no specific rash    Neurological:      General: No focal deficit present. Mental Status: She is alert and oriented to person, place, and time. Mental status is at baseline. Psychiatric:         Mood and Affect: Mood normal.         Behavior: Behavior normal.       Assessment/Plan:  1. Acute right-sided low back pain without sciatica  Uncontrolled and ongoing   Add in lidocaine patches   Discussed options - really concerned for shingles. Discussed options, covering with valtrex. Continue seeing pain management   Reviewed supportive care     2. Metastatic primary lung cancer, right ([facility] [address]  Seeing Dr. Adriana Hernandez. Agree with follow up pet scan - working on getting approved via oncology     Discussed medications with patient, who voiced understanding of their use and indications. All questions answered.     Reviewed follow up criteria   Keep AWV appt     Electronically signed by NATALIE Washington CNP on 6/30/2021 at 9:46 AM

## 2021-07-14 ENCOUNTER — OFFICE VISIT (OUTPATIENT)
Dept: PULMONOLOGY | Age: 71
End: 2021-07-14
Payer: MEDICARE

## 2021-07-14 VITALS
BODY MASS INDEX: 19.4 KG/M2 | DIASTOLIC BLOOD PRESSURE: 60 MMHG | OXYGEN SATURATION: 96 % | SYSTOLIC BLOOD PRESSURE: 98 MMHG | HEART RATE: 100 BPM | HEIGHT: 68 IN | WEIGHT: 128 LBS

## 2021-07-14 DIAGNOSIS — C34.91 METASTATIC PRIMARY LUNG CANCER, RIGHT (HCC): ICD-10-CM

## 2021-07-14 DIAGNOSIS — J93.9 PNEUMOTHORAX ON RIGHT: Primary | ICD-10-CM

## 2021-07-14 PROCEDURE — 1123F ACP DISCUSS/DSCN MKR DOCD: CPT | Performed by: INTERNAL MEDICINE

## 2021-07-14 PROCEDURE — 3017F COLORECTAL CA SCREEN DOC REV: CPT | Performed by: INTERNAL MEDICINE

## 2021-07-14 PROCEDURE — G8420 CALC BMI NORM PARAMETERS: HCPCS | Performed by: INTERNAL MEDICINE

## 2021-07-14 PROCEDURE — G8399 PT W/DXA RESULTS DOCUMENT: HCPCS | Performed by: INTERNAL MEDICINE

## 2021-07-14 PROCEDURE — 4040F PNEUMOC VAC/ADMIN/RCVD: CPT | Performed by: INTERNAL MEDICINE

## 2021-07-14 PROCEDURE — G8427 DOCREV CUR MEDS BY ELIG CLIN: HCPCS | Performed by: INTERNAL MEDICINE

## 2021-07-14 PROCEDURE — 99214 OFFICE O/P EST MOD 30 MIN: CPT | Performed by: INTERNAL MEDICINE

## 2021-07-14 PROCEDURE — 1036F TOBACCO NON-USER: CPT | Performed by: INTERNAL MEDICINE

## 2021-07-14 PROCEDURE — 1090F PRES/ABSN URINE INCON ASSESS: CPT | Performed by: INTERNAL MEDICINE

## 2021-07-14 RX ORDER — OXYCODONE AND ACETAMINOPHEN 7.5; 325 MG/1; MG/1
1 TABLET ORAL EVERY 6 HOURS PRN
COMMUNITY
End: 2022-05-23 | Stop reason: DRUGHIGH

## 2021-07-14 NOTE — PROGRESS NOTES
PULMONARY OFFICE FOLLOW UP NOTE    REASON FOR VISIT:   Chief Complaint   Patient presents with    Follow-up     pneumothorax, having rib pain       DATE OF VISIT: 7/14/2021     HISTORY OF PRESENT ILLNESS: 66y.o. year old female was diagnosed to have T2N2 non-small cell lung cancer which has  metastasized comes in to the pulmonary office for follow-up. Patient has been undergoing immunotherapy due to metastatic disease. She has responded well to this therapy. Patient had  right-sided pneumothorax in April 2021. This did not require any intervention and spontaneously resolved. Currently her breathing is stable. Continues to report right-sided rib pain which is disturbing her sleep. She is using lidocaine patches and oxycodone to relieve the pain. Is frustrated that she is not able to get any relief. Planning to go to pain control center again. Previously: She did report right-sided chest pain which was more neuropathic in characteristics and she underwent nerve block 2 weeks ago. For the last 2 weeks she has been having some increased shortness of breath and right-sided chest heaviness. Symptoms are minimal and have not impacted her day-to-day functioning. This morning she had a surveillance CT chest done which showed right-sided pneumothorax. At the time of interview patient is sitting in chair in no apparent respiratory distress. Reports no significant symptoms. Denies any wheezing, fevers, night sweats. Used to smoke more than 1 pack/day for 40 years before quitting 20 years ago. REVIEW OF SYSTEMS: 14 point ROS is negative beside mentioned in [address]        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    Cancer ([facility] [address]     NSCLC    Depression     Hypertension     MVP (mitral valve prolapse)     does not follow with cardiology    Pituitary adenoma [facility])        PAST SURGICAL HISTORY:   Past Surgical History:   Procedure Laterality Date    BREAST BIOPSY  , [address] N/A 2019    BRONCHOSCOPY ELECTROMAGNETIC performed by Willie Leslie MD at [address] [place] [address] N/A 2019    BRONCHOSCOPY W/EBUS FNA performed by Willie Leslie MD at [address] [place] [address]  2019    BRONCHOSCOPY BIOPSY BRONCHUS performed by Willie Leslie MD at [address] [place] [address]  2019    BRONCHOSCOPY ALVEOLAR LAVAGE performed by Willie Leslie MD at [address] [place] [address]  2019    BRONCHOSCOPY BRUSHINGS performed by Willie Leslie MD at [address] [place] Bilateral . , 's    HYSTERECTOMY  1987    MEDIASTINOSCOPY N/A 2020    CERVICAL MEDIASTINOSCOPY WITH LYMPH NODE BIOPSY WITH FROZEN SECTION. 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       SOCIAL HISTORY:   Social History     Tobacco Use    Smoking status: Former Smoker     Packs/day: 1.50     Years: 40.00     Pack years: 60.00     Types: Cigarettes     Quit date: 2007     Years since quittin.1    Smokeless tobacco: Never Used   Vaping Use    Vaping Use: Never used   Substance Use Topics    Alcohol use: No     Comment: none     Drug use: No       FAMILY HISTORY:   Family History   Problem Relation Age of Onset    Cancer Mother 68        bone     Arthritis Father     High Blood Pressure Father     Parkinsonism Father     Colon Cancer Sister 61       MEDICATIONS:     Current Outpatient Medications on File Prior to Visit   Medication Sig Dispense Refill    oxyCODONE-acetaminophen (PERCOCET) 7.5-325 MG per tablet Take 1 tablet by mouth every 4 hours as needed for Pain.       polyethylene affect. Patient answers questions appropriately. HEMATOLOGIC/LYMPHATIC/ IMMUNOLOGIC: No palpable lymphadenopathy. NEUROLOGIC: Alert and oriented x 3. Groslly non-focal. Motor strength is 5+/5 in all muscle groups. The patient has a normal sensorium globally. Labs:    Lab Summary Latest Ref Rng & Units 12/14/2020   Sodium 136 - 145 mmol/L 138   Potassium 3.5 - 5.1 mmol/L 4.3   BUN 7 - 20 mg/dL 13   Creatinine 0.6 - 1.2 mg/dL 0.6   Glucose 70 - 99 mg/dL 96   Calcium 8.3 - 10.6 mg/dL 9.8   Phosphorus 2.5 - 4.9 mg/dL 4.0   Albumin 3.4 - 5.0 g/dL 4.3   Some recent data might be hidden         IMAGING:    Narrative   EXAMINATION:   CT OF THE CHEST, ABDOMEN, AND PELVIS WITH CONTRAST 4/16/2021 6:50 am           FINDINGS:       Chest:       Mediastinum: The central airways are patent.  There is no hilar or   mediastinal adenopathy.       Lungs/pleura: There has been interval development of a small right   pneumothorax with approximately 2.1 cm separation of apical lung edge from   apical chest wall. .  Soft tissue mass within the right upper lobe extending   to the right hilum measures 3 x 3.6 cm on the current exam compared with 3.1   x 3 cm prior.  The lungs are otherwise clear.  There is no pleural effusion.       Soft Tissues/Bones: There is no acute fracture or aggressive osseous lesion.           Abdomen/Pelvis:       Organs: Small 7 mm hypodense metastasis within the inferior right hepatic   lobe is unchanged from prior.  Several small simple cysts within the left   hepatic lobe are stable in size and appearance from prior.  The remainder of   the solid abdominal organs are unremarkable.       GI/Bowel: There is no bowel dilatation, wall thickening or obstruction. Diverticular changes are noted throughout the left hemicolon.       Pelvis: The uterus is surgically absent.  The bladder is unremarkable.       Peritoneum/Retroperitoneum: There is no free air, free fluid or   intraperitoneal inflammatory change.  There is no adenopathy.       Bones/Soft Tissues: There is no acute fracture or aggressive osseous lesion.           Impression   1. Small right apical pneumothorax. 2. Slight interval enlargement of the patient's right upper lobe bronchogenic   carcinoma. 3. Stable solitary right inferior hepatic metastasis. Findings were discussed with Dr. Jeronimo Echevarria at 8:15 am on 4/16/2021.               Pulmonary Functions Testing Results:    IMPRESSION AND RECOMMENDATIONS:     1. Secondary spontaneous pneumothorax  -Patient's right-sided pneumothorax has resolved. Repeat imaging does not show any persistence of pneumothorax. -Right-sided chest pain is most likely due to neuropathy. Patient planning to undergo nerve ablation again.  -No further pulmonary work-up needed. 2. Mass of upper lobe of right lung  -Right upper lobe lung mass is proven to be adenocarcinoma which is metastatic. Patient is currently undergoing immunotherapy. Lung mass has increased in size since the last imaging done at in 2020.  -Metastatic lesion in the liver and the brain have resolved. 3. Metastatic primary lung cancer, right ([address]  -Management as per medical oncology. Return if symptoms worsen or fail to improve. Archie Concepcion MD  Pulmonary Critical Care and Sleep Medicine  7/14/2021, 1:32 PM    This note was completed using dragon medical speech recognition software. Grammatical errors, random word insertions, pronoun errors and incomplete sentences are occasional consequences of this technology due to software limitations. If there are questions or concerns about the content of this note of information contained within the body of this dictation they should be addressed with the provider for clarification.

## 2021-09-28 RX ORDER — PROPRANOLOL HYDROCHLORIDE 20 MG/1
TABLET ORAL
Qty: 270 TABLET | Refills: 3 | Status: SHIPPED | OUTPATIENT
Start: 2021-09-28

## 2021-09-29 ENCOUNTER — HOSPITAL ENCOUNTER (OUTPATIENT)
Dept: MRI IMAGING | Age: 71
Discharge: HOME | End: 2021-09-29
Payer: MEDICARE

## 2021-09-29 DIAGNOSIS — D35.3 BENIGN NEOPLASM OF PITUITARY GLAND AND CRANIOPHARYNGEAL DUCT (POUCH) (HCC): ICD-10-CM

## 2021-09-29 DIAGNOSIS — D35.2 BENIGN NEOPLASM OF PITUITARY GLAND AND CRANIOPHARYNGEAL DUCT (POUCH) (HCC): ICD-10-CM

## 2021-09-29 LAB
CREAT SERPL-MCNC: 0.5 MG/DL (ref 0.6–1.2)
GFR AFRICAN AMERICAN: >60
GFR NON-AFRICAN AMERICAN: >60

## 2021-09-29 PROCEDURE — 82565 ASSAY OF CREATININE: CPT

## 2021-09-29 PROCEDURE — 36415 COLL VENOUS BLD VENIPUNCTURE: CPT

## 2021-09-29 PROCEDURE — A9577 INJ MULTIHANCE: HCPCS | Performed by: NEUROLOGICAL SURGERY

## 2021-09-29 PROCEDURE — 2580000003 HC RX 258: Performed by: NEUROLOGICAL SURGERY

## 2021-09-29 PROCEDURE — 6360000004 HC RX CONTRAST MEDICATION: Performed by: NEUROLOGICAL SURGERY

## 2021-09-29 PROCEDURE — 70553 MRI BRAIN STEM W/O & W/DYE: CPT

## 2021-09-29 RX ORDER — SODIUM CHLORIDE 0.9 % (FLUSH) 0.9 %
5-40 SYRINGE (ML) INJECTION PRN
Status: DISCONTINUED | OUTPATIENT
Start: 2021-09-29 | End: 2021-09-30 | Stop reason: HOSPADM

## 2021-09-29 RX ADMIN — GADOBENATE DIMEGLUMINE 11 ML: 529 INJECTION, SOLUTION INTRAVENOUS at 10:53

## 2021-09-29 RX ADMIN — Medication 10 ML: at 10:54

## 2021-10-14 ENCOUNTER — TELEPHONE (OUTPATIENT)
Dept: INTERNAL MEDICINE CLINIC | Age: 71
End: 2021-10-14

## 2021-10-15 ENCOUNTER — OFFICE VISIT (OUTPATIENT)
Dept: INTERNAL MEDICINE CLINIC | Age: 71
End: 2021-10-15
Payer: MEDICARE

## 2021-10-15 VITALS
BODY MASS INDEX: 18.09 KG/M2 | SYSTOLIC BLOOD PRESSURE: 128 MMHG | DIASTOLIC BLOOD PRESSURE: 74 MMHG | WEIGHT: 119 LBS | HEART RATE: 95 BPM | OXYGEN SATURATION: 99 %

## 2021-10-15 DIAGNOSIS — M54.14 THORACIC RADICULOPATHY: Primary | ICD-10-CM

## 2021-10-15 DIAGNOSIS — C34.91 METASTATIC PRIMARY LUNG CANCER, RIGHT (HCC): ICD-10-CM

## 2021-10-15 PROCEDURE — 99214 OFFICE O/P EST MOD 30 MIN: CPT | Performed by: NURSE PRACTITIONER

## 2021-10-15 PROCEDURE — G8399 PT W/DXA RESULTS DOCUMENT: HCPCS | Performed by: NURSE PRACTITIONER

## 2021-10-15 PROCEDURE — G8484 FLU IMMUNIZE NO ADMIN: HCPCS | Performed by: NURSE PRACTITIONER

## 2021-10-15 PROCEDURE — 3017F COLORECTAL CA SCREEN DOC REV: CPT | Performed by: NURSE PRACTITIONER

## 2021-10-15 PROCEDURE — G8419 CALC BMI OUT NRM PARAM NOF/U: HCPCS | Performed by: NURSE PRACTITIONER

## 2021-10-15 PROCEDURE — 1036F TOBACCO NON-USER: CPT | Performed by: NURSE PRACTITIONER

## 2021-10-15 PROCEDURE — G8427 DOCREV CUR MEDS BY ELIG CLIN: HCPCS | Performed by: NURSE PRACTITIONER

## 2021-10-15 PROCEDURE — 4040F PNEUMOC VAC/ADMIN/RCVD: CPT | Performed by: NURSE PRACTITIONER

## 2021-10-15 PROCEDURE — 1090F PRES/ABSN URINE INCON ASSESS: CPT | Performed by: NURSE PRACTITIONER

## 2021-10-15 PROCEDURE — 1123F ACP DISCUSS/DSCN MKR DOCD: CPT | Performed by: NURSE PRACTITIONER

## 2021-10-15 RX ORDER — OXYCODONE HYDROCHLORIDE 20 MG/1
20 TABLET ORAL EVERY 12 HOURS
COMMUNITY
End: 2022-02-15 | Stop reason: ALTCHOICE

## 2021-10-15 NOTE — PROGRESS NOTES
(THERAPEUTIC MULTIVITAMIN-MINERALS) tablet Take 1 tablet by mouth daily      vitamin C (ASCORBIC ACID) 500 MG tablet Take 1,000 mg by mouth daily      Phenylephrine-Aspirin (EVITA-SELTZER PLUS SINUS PO) Take 2 tablets by mouth as needed. No current facility-administered medications for this visit. Review of Systems  Negative other than HPI     Vitals:    10/15/21 0919   BP: 128/74   Pulse: 95   SpO2: 99%   Weight: 119 lb (54 kg)      Physical Exam  Constitutional:       General: She is not in acute distress. Appearance: Normal appearance. She is ill-appearing (chronically). HENT:      Head: Normocephalic and atraumatic. Cardiovascular:      Rate and Rhythm: Normal rate and regular rhythm. Heart sounds: Normal heart sounds. Pulmonary:      Effort: Pulmonary effort is normal. No respiratory distress. Breath sounds: Normal breath sounds. No wheezing. Chest:      Chest wall: No lacerations, deformity or tenderness. Abdominal:      General: Bowel sounds are normal. There is no distension. Palpations: Abdomen is soft. Tenderness: There is no abdominal tenderness. There is no right CVA tenderness or left CVA tenderness. Musculoskeletal:      Thoracic back: Tenderness present. No swelling, deformity or spasms. Decreased range of motion. Back:       Right lower leg: No edema. Left lower leg: No edema. Comments: No flank pain   Skin:     General: Skin is warm and dry. Neurological:      General: No focal deficit present. Mental Status: She is alert and oriented to person, place, and time. Mental status is at baseline.    Psychiatric:         Mood and Affect: Mood normal.         Behavior: Behavior normal.       Assessment/Plan:  Thoracic radiculopathy  Uncontrolled   reviewed rib xray complete for this pain in May with pt in detail   Reviewed other imaging   Pt has further imaging scheduled  Pt encouraged to schedule with PT   Continue with plan per pain management  No red flags today     Metastatic lung CA  Continue with planned imaging   Continue with follow up with [facility]     Discussed medications with patient, who voiced understanding of their use and indications. All questions answered. Return if symptoms worsen or fail to improve.   Keep AWV appt     Electronically signed by NATALIE Gandara CNP on 10/15/2021 at 12:17 PM

## 2021-10-20 ENCOUNTER — HOSPITAL ENCOUNTER (OUTPATIENT)
Dept: PHYSICAL THERAPY | Age: 71
Setting detail: THERAPIES SERIES
Discharge: HOME | End: 2021-10-20
Payer: MEDICARE

## 2021-10-20 PROCEDURE — 97162 PT EVAL MOD COMPLEX 30 MIN: CPT

## 2021-10-20 PROCEDURE — 97110 THERAPEUTIC EXERCISES: CPT

## 2021-10-20 NOTE — PLAN OF CARE
Ramirez, [facility], [address]  Phone: [phone]   Fax:     [phone]                                                       Physical Therapy Certification    Dear Referring Practitioner: David Ruelas MD,    We had the pleasure of evaluating the following patient for physical therapy services at [address] [place] [address]. A summary of our findings can be found in the initial assessment below. This includes our plan of care. If you have any questions or concerns regarding these findings, please do not hesitate to contact me at the office phone number checked above. Thank you for the referral.       Physician Signature:_______________________________Date:__________________  By signing above (or electronic signature), therapists plan is approved by physician      Patient: Nurys Ott   : 1950   MRN: [id number]  Referring Physician: Referring Practitioner: David Ruelas MD      Evaluation Date: 10/20/2021      Medical Diagnosis Information:  Diagnosis: M51.34 (ICD-10-CM) - Other intervertebral disc degeneration, thoracic region   Treatment Diagnosis: midback pain, low back pain, core weakness, decreased activity tolerance                                         Insurance information: PT Insurance Information: medicare & [facility] aarp    Precautions/ Contra-indications: R lung tumor  Latex Allergy:  [x]NO      []YES  Preferred Language for Healthcare:   [x]English       []other:    C-SSRS Triggered by Intake questionnaire (Past 2 wk assessment ):   [] No, Questionnaire did not trigger screening. [x] Yes, Patient intake triggered C-SSRS Screening      [x] C-SSRS Screening completed  [] PCP notified via Epic    SUBJECTIVE: Patient stated complaint: the patient has  Lung tumor () currently and has been through chemo, radiation, etc. And then last august she started to have pain in her side.  During immunotherapy, she pushed off any therapy, and symptoms kept getting worse and worse. She was referred to Sharon Lopez MD, and had T7,8,9 nerve blocks (3x), ablations (2x) - and she reports there are times she feels like it helps and time that is hasn't. She is independent, and lives alone. She notices postural changes and tries to address this, but sits in right sidebend. She reports she is very diligent about not falling, and had a near fall yesterday, but did not and was able to catch herself. She reports increased tightness in low back today. She reports her low back feels tired today. She comments she feels like she is just using pain medication now, and cannot discern if she is really getting relief. She reports she may spend about 2-3 days a week in bed, and not get out much. She reports appetite is less; she has lost about 20 lbs over the last 2 years since her cancer started. She has a CT scan next Tuesday and will follow up with oncologist to assess condition of tumor. She communicates with friends in town and her sisters every week. She reports she feels like her activity tolerance is low. She noticed that in the past few years her posture has degraded considerably. She comments that she has seen a massage therapist for years, and the therapist told her that the patient's posture was not scoliotic before her cancer diagnosis. Relevant Medical History: anxiety, depression, OA, lung cancer, HTN  Functional Outcome: DEENA: raw score = 27/50; dysfunction = 54%    Pain Scale: 7/10  Easing factors: pain medication, heating pad on R ribcage, aspercream  Provocative factors: leaning over to brush teeth, do dishes increases chest pain    Type: [x]Constant   []Intermittent  []Radiating []Localized []other:     Numbness/Tingling: denies    Occupation/School: disability -> retired    Living Status/Prior Level of Function: Prior to this injury / incident, pt was independent with ADLs and IADLs. She remains independent.  She is now paying someone to mow her yard and uses [employer].    OBJECTIVE:     Palpation: +2 TTP right thoracic paraspinals, R intercostals    Functional Mobility/Transfers: great difficulty in left sidelying    Posture: dextroscoliosis; flexed, right sidebent, left rotation at thoracic spine    Inspection: left shoulder sits higher than right shoulder; patient with rounded, forward shoulders; difficulty with rising from chair  Patient unable to lie prone    Gait: (include devices/WB status): requires further assessment at next visit    Bandages/Dressings/Incisions: NA    Dermatomes Normal Abnormal Comments   Top of head (C1) x     Posterior occipital region (C2) x     Side of neck (C3) x     Top of shoulder (C4) x     Lateral deltoid (C5) x     Tip of thumb (C6) x     Distal middle finger (C7) x     Distal fifth finger (C8) x     Medial forearm (T1) x         Myotomes Normal Abnormal Comments   Neck flexion (C1-C2) x     Neck sidebending (C3) x     Shoulder elevation (C4) x     Shoulder abduction (C5) x     Elbow flexion/wrist extension (C6) x     Elbow extension/wrist flexion (C7) x     Thumb abduction (C8) x     Finger abduction (T1) x     Hip flexion (L1-L2) x     Knee extension (L2-L4) x     Dorsiflexion (L4-L5) x     Great Toe Ext (L5)   NT   Ankle Eversion (S1-S2) x     Ankle PF(S1-S2) x       Reflexes - NT this date Normal Abnormal Comments   C5-6 Biceps      C5-6 Brachioradialis      C7-8 Triceps      Lings      S1-2 Seated achilles      S1-2 Prone knee bend      L3-4 Patellar tendon      Clonus      Babinski        ROM  Comments   T/S flexion 10    L/S sidebend 8cm L, 18cm R Pain on R w/ left sidebend   T/S rotation L > R Pain w/ L rotation   Lumbar Flexion 30    Lumbar Extension 10      ROM LEFT RIGHT Comments         Cervical Side Bend   NT this date   Cervical Rotation   NT this date   Shoulder Flex   NT this date   Shoulder Abd   NT this date   Shoulder ER      Shoulder IR            Hip Flexion 125 125    Hip Abd      Hip ER      Hip IR 38 38    Hip Extension      Knee Ext [facility] WFL    Knee Flex [facility] WFL          Hamstring Flex   NT this date   Piriformis                      Strength LEFT RIGHT Comments   Shoulder flexion      Shoulder scaption      Shoulder ER      Shoulder IR      Biceps      Triceps                  Multifidus   Unable to perform   Rectus Abdominis   3/5   Hip Flexors 4 4-    Hip Abductors 4 4-    Hip Extensors      Hip Internal Rotators      Hip External Rotators        TA Muscle Contraction Scale    Criteria                                               Score  Quality of Contraction   Not Present      [] 0   Rapid, Superificial     [x] 1   Just Perceptible     [] 2     Gentle, Slow      [] 3    Substitution   Resting       [] 0   Moderate to Strong     [x] 1    Subtle Perceptible     [] 2   None       [] 3    Symmetry of Contraction   Unilateral       [] 0   Bilateral/Asymmetrical     [] 1   Symmetrical       [x] 2    Breathing     Inability/Difficulty Breathing during contraction [x] 0   Able to hold contraction while Breathing  [] 1    Holding   Able to Hold Contraction <10 s   [x] 0   Able to Hold Contraction >10 s   [] 1      _4_/10  Adapted from Edgar Richter al, Copyright 2009    Thoracic Joint mobility: (inferred through movement assessment)   []Normal    [x]Hypo   []Hyper    Lumbar Joint mobility:  (inferred through movement assessment)    []Normal    [x]Hypo   []Hyper      Neural dynamic tension testing Normal Abnormal Comments   ULTT            Slump Test  - Degree of knee flexion:       SLR       0-30 x     30-70 x     Femoral nerve (L2-4)          Orthopaedic Special Tests  Normal Abnormal NT Comments    Cervical:       Hautard's        Rhomberg       Sharps-Tejal       Cervical Torsion / Body Rotation        C2 Kick       Modified Shear       Compression       Distraction               Lumbar:        Toe walk       Heel walk       Fwd Bend-aberrant or innominate mvmt  x Scoliotic posture; T/S pain   Trendelenburg   x  R/L   Kemps/Quadrant       Stork  x  R/L   DERREK/Chapito       Hip scour       SLR x      Crossed SLR       Supine to sit       Hip thrust       SI distraction/compression   x    PA/Spring    Unable to tolerate prone   Prone Instability test   x    Prone knee bend   x    Sacral Spring/thrust   x                                         [x] Patient history, allergies, meds reviewed. Medical chart reviewed. See intake form. Review Of Systems (ROS):  [x]Performed Review of systems (Integumentary, CardioPulmonary, Neurological) by intake and observation. Intake form has been scanned into medical record. Patient has been instructed to contact their primary care physician regarding ROS issues if not already being addressed at this time.       Co-morbidities/Complexities (which will affect course of rehabilitation):   []None        []Hx of COVID   Arthritic conditions   []Rheumatoid arthritis (M05.9)  [x]Osteoarthritis (M19.91)  []Gout   Cardiovascular conditions   [x]Hypertension (I10)  []Hyperlipidemia (E78.5)  []Angina pectoris (I20)  []Atherosclerosis (I70)  []Pacemaker  []Hx of CABG/stent/  cardiac surgeries   Musculoskeletal conditions   []Disc pathology   []Congenital spine pathologies   []Osteoporosis (M81.8)  []Osteopenia (M85.8)  []Scoliosis       Endocrine conditions   []Hypothyroid (E03.9)  []Hyperthyroid Gastrointestinal conditions   []Constipation (N67.18)   Metabolic conditions   []Morbid obesity (E66.01)  []Diabetes type 1(E10.65) or 2 (E11.65)   []Neuropathy (G60.9)     Cardio/Pulmonary conditions   []Asthma (J45)  []Coughing   []COPD (J44.9)  []CHF  []A-fib   Psychological Disorders  []Anxiety (F41.9)  [x]Depression (F32.9)   []Other:   Developmental Disorders  []Autism (F84.0)  []CP (G80)  []Down Syndrome (Q90.9)  []Developmental delay     Neurological conditions  []Prior Stroke (I69.30)  []Parkinson's (G20)  []Encephalopathy (G93.40)  []MS (G35)  []Post-polio (G14)  []SCI  []TBI  []ALS Other conditions  []Fibromyalgia (M79.7)  []Vertigo  []Syncope  []Kidney Failure  [x]Cancer      []currently undergoing                treatment  []Pregnancy  []Incontinence   Prior surgeries  []involved limb  []previous spinal surgery  [] section birth  []hysterectomy  []bowel / bladder surgery  []other relevant surgeries   []Other:              Barriers to/and or personal factors that will affect rehab potential:              []Age  []Sex   []Smoker              []Motivation/Lack of Motivation                        [x]Co-Morbidities              []Cognitive Function, education/learning barriers              []Environmental, home barriers              []profession/work barriers  []past PT/medical experience  []other:  Justification: may negatively impact potential    Falls Risk Assessment (30 days): despite near fall yesterday,  [x] Falls Risk assessed and no intervention required. [] Falls Risk assessed and Patient requires intervention due to being higher risk   TUG score (>12s at risk):     [] Falls education provided, including       ASSESSMENT: The patient is a 66year old female who presents with dextroscoliosis and spinal degeneration resulting in thoracic, lumbar and right rib cage pain. The patient's activity tolerance, posture, cardiopulmonary endurance, muscle endurance and strength has suffered tremendously since being diagnosed with lung cancer in 2019 and while undergoing cancer treatment. She will benefit from skilled PT services to promote improved posture, core strength, muscle power and endurance as well as reduce pain from spinal positioning and degradation in order to promote greater independence and activity tolerance.      Functional Impairments:     [x]Noted cervical/thoracic/lumbar/GHJ/proximal hip hypomobility   []Noted cervical/thoracic/lumbosacral and/or generalized hypermobility   [x]Decreased cervical/UE and/or lumbosacral/hip/LE functional ROM   []Noted Headache pain aggravated by neck movements with/without dizziness   []Abnormal reflexes/sensation/myotomal/dermatomal deficits   []Decreased DCF control or ability to hold head up   [x]Decreased core/proximal hip strength and neuromuscular control    [x]Decreased RC/scapular/core strength and neuromuscular control    [x]Decreased UE and/or LE functional strength  []Reduced balance/proprioceptive control    []other:      Functional Activity Limitations (from functional questionnaire and intake)   [x]Reduced ability to tolerate prolonged functional positions   [x]Reduced ability or difficulty with changes of positions or transfers between positions   [x]Reduced ability to maintain good posture and demonstrate good body mechanics with sitting, bending, and lifting   [] Reduced ability or tolerance with driving and/or computer work   [x]Reduced ability to perform lifting, reaching, carrying tasks   [x]Reduced ability to concentrate   [x]Reduced ability to sleep    [x]Reduced ability to tolerate any impact through UE or spine   [x]Reduced ability to ambulate prolonged functional periods/distances/surfaces   [x]Reduced ability to squat   [x]Reduced ability to forward bend   [x]Reduced ability to ascend/descend stairs   []other:    Participation Restrictions   [x]Reduced participation in self care activities   [x]Reduced participation in home management activities   []Reduced participation in work activities   [x]Reduced participation in social activities. []Reduced participation in sport/recreational activities. Classification/Subgrouping:   []Signs/symptoms consistent with spinal instability/stabilization subgroup. []Signs/symptoms consistent with spinal mobilization/manipulation subgroup, myotomes and dermatomes intact. Meets manipulation criteria.     []signs/symptoms consistent with neck pain with mobility deficits     []signs/symptoms consistent with neck pain with movement coordinated impairments    []signs/symptoms consistent with neck pain with radiating pain    []signs/symptoms consistent with neck pain with headaches (cervicogenic)    []Signs/symptoms consistent with nerve root involvement including myotome & dermatome dysfunction   [x]sign/symptoms consistent with spinal facet dysfunction   []signs/symptoms consistent suggesting central cord compression/UMN syndromes   []Signs/symptoms consistent with Lumbar direction specific/centralization subgroup   []Signs/symptoms consistent with Cervical and/or Lumbar traction subgroup   []signs/symptoms consistent with discogenic cervical pain   []signs/symptoms consistent with rib dysfunction   [x]signs/symptoms consistent with postural dysfunction   []Signs/symptoms consistent with lumbar stenosis type dysfunction   []signs/symptoms consistent with shoulder pathology    []signs/symptoms consistent with post-surgical status including decreased ROM, strength and function.    []signs/symptoms consistent with pathology which may benefit from Dry Needling   [x]signs/symptoms which may limit the use of advanced manual therapy techniques: (intolerance to end range positions)        Prognosis/Rehab Potential:      []Excellent   [x]Good    [x]Fair   []Poor    Tolerance of evaluation/treatment:    []Excellent   [x]Good    [x]Fair   []Poor    Physical Therapy Evaluation Complexity Justification  [x] A history of present problem with:  [] no personal factors and/or comorbidities that impact the plan of care;  []1-2 personal factors and/or comorbidities that impact the plan of care  [x]3 personal factors and/or comorbidities that impact the plan of care  [x] An examination of body systems using standardized tests and measures addressing any of the following: body structures and functions (impairments), activity limitations, and/or participation restrictions;:  [] a total of 1-2 or more elements   [] a total of 3 or more elements   [x] a total of 4 or more elements   [x] A clinical presentation with:  [] stable and/or uncomplicated characteristics   [x] evolving clinical presentation with changing characteristics  [] unstable and unpredictable characteristics;   [x] Clinical decision making of [] low, [x] moderate, [] high complexity using standardized patient assessment instrument and/or measurable assessment of functional outcome. [] EVAL (LOW) 80735 (typically 20 minutes face-to-face)  [x] EVAL (MOD) 27934 (typically 30 minutes face-to-face)  [] EVAL (HIGH) 40223 (typically 45 minutes face-to-face)  [] RE-EVAL     PLAN:   Frequency/Duration:  2 days per week for 4 Weeks:  Interventions:  [x]  Therapeutic exercise including: strength training, ROM, for cervical spine,scapula, core and Upper extremity, including postural re-education. [x]  NMR activation and proprioception for Deep cervical flexors, periscapular and RC muscles and Core, including postural re-education. [x]  Manual therapy as indicated for C/T spine, ribs, Soft tissue to include: IASTM, STM, PROM, Gr I-IV mobilizations. [x] Modalities as needed that may include: thermal agents, E-stim, Biofeedback, US, iontophoresis as indicated  [x] Patient education on joint protection, postural re-education, activity modification, progression of HEP. HEP instruction   Access Code: [email or web address]  URL: [email or web address]  Date: 10/20/2021  Prepared by: Renate Pitts    Exercises  Seated Thoracic Flexion and Rotation with Arms Crossed - 2-3 x daily - 6 x weekly - 1 sets - 8 reps - 3 hold  Standing Scapular Retraction - 2-3 x daily - 6 x weekly - 1 sets - 8 reps - 3 hold  Seated Anterior Pelvic Tilt - 2-3 x daily - 6 x weekly - 1 sets - 8 reps - 3 hold  Seated Sidebending - 2-3 x daily - 6 x weekly - 1 sets - 8 reps - 3 hold    GOALS:  Patient stated goal: posture training and some strength  [] Progressing: [] Met: [] Not Met: [] Adjusted    Therapist goals for Patient:   Short Term Goals:  To be achieved in: 2 weeks  1. Independent in HEP and progression per patient tolerance, in order to prevent re-injury. [] Progressing: [] Met: [] Not Met: [] Adjusted  2. Patient will have a decrease in pain to <5/10 on average facilitate improvement in movement, function, and ADLs as indicated by Functional Deficits. [] Progressing: [] Met: [] Not Met: [] Adjusted    Long Term Goals: To be achieved in: 4 weeks  1. Disability index score of 44% or less for the NDI and/or DEENA to assist with reaching prior level of function. [] Progressing: [] Met: [] Not Met: [] Adjusted  2. Patient will demonstrate increased AROM to FUNCTIONAL (FOR THE PATIENT) and WITHOUT PAIN of thoracic & lumbar spine for proper joint functioning as indicated by patients Functional Deficits. [] Progressing: [] Met: [] Not Met: [] Adjusted  3. Patient will demonstrate an increase in postural awareness and control IN STANDING FOR UP TO 60 SECONDS to allow for proper functional mobility as indicated by patients Functional Deficits. [] Progressing: [] Met: [] Not Met: [] Adjusted  4. Patient will demonstrate an increase in Strength to >4/5 proximal hip and core activation >6/10 tvAbd to allow for proper functional mobility as indicated by patients Functional Deficits. [] Progressing: [] Met: [] Not Met: [] Adjusted  5. Patient will return to functional activities including bending over sink to wash dishes or brush teeth without increased symptoms or restriction.    [] Progressing: [] Met: [] Not Met: [] Adjusted    Electronically signed by:  Raymond Lyons, PT, DPT, OMT-C

## 2021-10-20 NOTE — FLOWSHEET NOTE
[address] [facility] Physical Therapy  Phone: [phone]   Fax: [phone]    Physical Therapy Daily Treatment Note  Date:  10/20/2021    Patient Name:  Rufino Patricio    :  1950  MRN: [id number]  Medical/Treatment Diagnosis Information:  · Diagnosis: M51.34 (ICD-10-CM) - Other intervertebral disc degeneration, thoracic region  · Treatment Diagnosis: midback pain, low back pain, core weakness, decreased activity tolerance, impaired posture  Insurance/Certification information:  PT Insurance Information: medicare & [facility] aarp  Physician Information:  Referring Practitioner: Jessica Lu MD  Plan of care signed (Y/N): []  Yes [x]  No     Date of Patient follow up with Physician:      Progress Report: [x]  Yes  []  No     Date Range for reporting period:  Beginning: 10/20/21  Ending: TBD    Progress report due (10 Rx/or 30 days whichever is less): visit #8 or 59/42    Recertification due (POC duration/ or 90 days whichever is less): visit #8    Visit # Insurance Allowable Auth required? Date Range   1 PMN []  Yes  [x]  No        Latex Allergy:  [x]NO      []YES  Preferred Language for Healthcare:   [x]English       []other:    Functional Scale:        Date assessed:  DEENA: raw score = 27/50; dysfunction = 54%   10/20/21    Pain level:  7/10     SUBJECTIVE:  See eval    OBJECTIVE: See eval      RESTRICTIONS/PRECAUTIONS: R lung tumor    Exercises/Interventions:     Therapeutic Exercises (33864) Resistance / level Sets/sec Reps Notes          SEATED T/S ROTATION R/L   8x    STANDING SCAP RETRACTION   8x    SEATED T/L SIDEBEND   8x     SEATED ANTERIOR PELVIC TILT   8x                                Therapeutic Activities (37183)                                          Neuromuscular Re-ed (52808)                                                 Manual Intervention (90303)                                                   Modalities: MHP for relief if desired    Pt. Education:  -patient educated on diagnosis, prognosis and expectations for rehab  -all patient questions were answered    Home Exercise Program:  Access Code: [email or web address]  URL: [email or web address]  Date: 10/20/2021  Prepared by: Army Bond     Exercises  Seated Thoracic Flexion and Rotation with Arms Crossed - 2-3 x daily - 6 x weekly - 1 sets - 8 reps - 3 hold  Standing Scapular Retraction - 2-3 x daily - 6 x weekly - 1 sets - 8 reps - 3 hold  Seated Anterior Pelvic Tilt - 2-3 x daily - 6 x weekly - 1 sets - 8 reps - 3 hold  Seated Sidebending - 2-3 x daily - 6 x weekly - 1 sets - 8 reps - 3 hold    Therapeutic Exercise and NMR EXR  [x] (82041) Provided verbal/tactile cueing for activities related to strengthening, flexibility, endurance, ROM for improvements in  [x] LE / Lumbar: LE, proximal hip, and core control with self care, mobility, lifting, ambulation. [] UE / Cervical: cervical, postural, scapular, scapulothoracic and UE control with self care, reaching, carrying, lifting, house/yardwork, driving, computer work.  [] (37684) Provided verbal/tactile cueing for activities related to improving balance, coordination, kinesthetic sense, posture, motor skill, proprioception to assist with   [] LE / lumbar: LE, proximal hip, and core control in self care, mobility, lifting, ambulation and eccentric single leg control. [] UE / cervical: cervical, scapular, scapulothoracic and UE control with self care, reaching, carrying, lifting, house/yardwork, driving, computer work.   [] (58433) Therapist is in constant attendance of 2 or more patients providing skilled therapy interventions, but not providing any significant amount of measurable one-on-one time to either patient, for improvements in  [] LE / lumbar: LE, proximal hip, and core control in self care, mobility, lifting, ambulation and eccentric single leg control.    [] UE / cervical: cervical, scapular, scapulothoracic and UE control with self care, reaching, carrying, lifting, house/yardwork, driving, computer work. NMR and Therapeutic Activities:    [] (33843 or 03687) Provided verbal/tactile cueing for activities related to improving balance, coordination, kinesthetic sense, posture, motor skill, proprioception and motor activation to allow for proper function of   [] LE: / Lumbar core, proximal hip and LE with self care and ADLs  [] UE / Cervical: cervical, postural, scapular, scapulothoracic and UE control with self care, carrying, lifting, driving, computer work.   [] (41380) Gait Re-education- Provided training and instruction to the patient for proper LE, core and proximal hip recruitment and positioning and eccentric body weight control with ambulation re-education including up and down stairs     Home Management Training / Self Care:  [] (02853) Provided self-care/home management training related to activities of daily living and compensatory training, and/or use of adaptive equipment for improvement with: ADLs and compensatory training, meal preparation, safety procedures and instruction in use of adaptive equipment, including bathing, grooming, dressing, personal hygiene, basic household cleaning and chores.      Home Exercise Program:    [x] (93340) Reviewed/Progressed HEP activities related to strengthening, flexibility, endurance, ROM of   [x] LE / Lumbar: core, proximal hip and LE for functional self-care, mobility, lifting and ambulation/stair navigation   [] UE / Cervical: cervical, postural, scapular, scapulothoracic and UE control with self care, reaching, carrying, lifting, house/yardwork, driving, computer work  [] (76670)Reviewed/Progressed HEP activities related to improving balance, coordination, kinesthetic sense, posture, motor skill, proprioception of   [] LE: core, proximal hip and LE for self care, mobility, lifting, and ambulation/stair navigation    [] UE / Cervical: cervical, postural,  scapular, scapulothoracic and UE facilitate improvement in movement, function, and ADLs as indicated by Functional Deficits. []? Progressing: []? Met: []? Not Met: []? Adjusted     Long Term Goals: To be achieved in: 4 weeks  1. Disability index score of 44% or less for the NDI and/or DEENA to assist with reaching prior level of function. []? Progressing: []? Met: []? Not Met: []? Adjusted  2. Patient will demonstrate increased AROM to FUNCTIONAL (FOR THE PATIENT) and WITHOUT PAIN of thoracic & lumbar spine for proper joint functioning as indicated by patients Functional Deficits. []? Progressing: []? Met: []? Not Met: []? Adjusted  3. Patient will demonstrate an increase in postural awareness and control IN STANDING FOR UP TO 60 SECONDS to allow for proper functional mobility as indicated by patients Functional Deficits. []? Progressing: []? Met: []? Not Met: []? Adjusted  4. Patient will demonstrate an increase in Strength to >4/5 proximal hip and core activation >6/10 tvAbd to allow for proper functional mobility as indicated by patients Functional Deficits. []? Progressing: []? Met: []? Not Met: []? Adjusted  5. Patient will return to functional activities including bending over sink to wash dishes or brush teeth without increased symptoms or restriction. []? Progressing: []? Met: []? Not Met: []? Adjusted    Overall Progression Towards Functional goals/ Treatment Progress Update:  [] Patient is progressing as expected towards functional goals listed. [] Progression is slowed due to complexities/Impairments listed. [] Progression has been slowed due to co-morbidities.   [x] Plan just implemented, too soon to assess goals progression <30days   [] Goals require adjustment due to lack of progress  [] Patient is not progressing as expected and requires additional follow up with physician  [] Other    Persisting Functional Limitations/Impairments:  [x]Sleeping []Sitting               [x]Standing [x]Transfers        [x]Walking []Kneeling               [x]Stairs [x]Squatting / bending   [x]ADLs [x]Reaching  [x]Lifting  [x]Housework  []Driving []Job related tasks  []Sports/Recreation []Other:        ASSESSMENT:  See eval  Treatment/Activity Tolerance:  [] Patient able to complete tx  [] Patient limited by fatigue  [x] Patient limited by pain  [] Patient limited by other medical complications  [] Other:     Prognosis: [x] Good [x] Fair  [] Poor    Patient Requires Follow-up: [x] Yes  [] No    Plan for next treatment session: assess repsonse to HEP. Begin with simple stretches and mobility for T/S, BUEs, L/S, BLEs. BEGIN GENTLE ACTIVATION OF POSTERIOR RTC & POSTERIOR SCAPULAR & THORACIC  MUSCULATURE   ASSESS STRENGTH OF BUEs, MT, RHOMBOIDS. ASSESS FLEXIBILITY OF HAMSTRING AND LATS. PLAN: See eval. PT 2x / week for 4 weeks. [] Continue per plan of care [] Alter current plan (see comments)  [x] Plan of care initiated [] Hold pending MD visit [] Discharge    Electronically signed by: Luis Johns, PT PT, DPT, OMT-C    Note: If patient does not return for scheduled/ recommended follow up visits, this note will serve as a discharge from care along with most recent update on progress.

## 2021-10-22 ENCOUNTER — HOSPITAL ENCOUNTER (OUTPATIENT)
Dept: PHYSICAL THERAPY | Age: 71
Setting detail: THERAPIES SERIES
Discharge: HOME | End: 2021-10-22
Payer: MEDICARE

## 2021-10-22 PROCEDURE — 97530 THERAPEUTIC ACTIVITIES: CPT

## 2021-10-22 PROCEDURE — 97110 THERAPEUTIC EXERCISES: CPT

## 2021-10-22 PROCEDURE — 97112 NEUROMUSCULAR REEDUCATION: CPT

## 2021-10-22 NOTE — FLOWSHEET NOTE
[address] [facility]  Phone: [phone]   Fax: [phone]    Physical Therapy Daily Treatment Note  Date:  10/22/2021    Patient Name:  Mahad Echevarria    :  1950  MRN: [id number]  Medical/Treatment Diagnosis Information:  · Diagnosis: M51.34 (ICD-10-CM) - Other intervertebral disc degeneration, thoracic region  · Treatment Diagnosis: midback pain, low back pain, core weakness, decreased activity tolerance, impaired posture  Insurance/Certification information:  PT Insurance Information: medicare & [facility] aarp  Physician Information:  Referring Practitioner: Denisse Wong MD  Plan of care signed (Y/N): []  Yes [x]  No     Date of Patient follow up with Physician:      Progress Report: []  Yes  [x]  No     Date Range for reporting period:  Beginning: 10/20/21  Ending: TBD    Progress report due (10 Rx/or 30 days whichever is less): visit #8 or     Recertification due (POC duration/ or 90 days whichever is less): visit #8    Visit # Insurance Allowable Auth required? Date Range    PMN []  Yes  [x]  No        Latex Allergy:  [x]NO      []YES  Preferred Language for Healthcare:   [x]English       []other:    Functional Scale:        Date assessed:  DEENA: raw score = 27/50; dysfunction = 54%   10/20/21    Pain level:  5-7/10     SUBJECTIVE: Just got out of 2nd hot sower and put [place] on her torso. Most pain is R sided along her low ribs.        OBJECTIVE:     RESTRICTIONS/PRECAUTIONS: R lung tumor    Exercises/Interventions:     Therapeutic Exercises (42718) Resistance / level Sets/sec Reps Notes   UBE   2 min forward, 2 min retro     SEATED T/S ROTATION R/L      STANDING SCAP RETRACTION      SEATED T/L SIDEBEND      SEATED ANTERIOR PELVIC TILT             SB flexion roll ups with PPT  Thor ext over SB on wall   1  10 sec x10  x10                  Therapeutic Activities (48234)       Sit to side lying  Side lying to supine  Supine to side lying  Side core control with self care, mobility, lifting, ambulation. [] UE / Cervical: cervical, postural, scapular, scapulothoracic and UE control with self care, reaching, carrying, lifting, house/yardwork, driving, computer work.  [] (09174) Provided verbal/tactile cueing for activities related to improving balance, coordination, kinesthetic sense, posture, motor skill, proprioception to assist with   [] LE / lumbar: LE, proximal hip, and core control in self care, mobility, lifting, ambulation and eccentric single leg control. [] UE / cervical: cervical, scapular, scapulothoracic and UE control with self care, reaching, carrying, lifting, house/yardwork, driving, computer work.   [] (20531) Therapist is in constant attendance of 2 or more patients providing skilled therapy interventions, but not providing any significant amount of measurable one-on-one time to either patient, for improvements in  [] LE / lumbar: LE, proximal hip, and core control in self care, mobility, lifting, ambulation and eccentric single leg control. [] UE / cervical: cervical, scapular, scapulothoracic and UE control with self care, reaching, carrying, lifting, house/yardwork, driving, computer work.      NMR and Therapeutic Activities:    [x] (22091 or 64302) Provided verbal/tactile cueing for activities related to improving balance, coordination, kinesthetic sense, posture, motor skill, proprioception and motor activation to allow for proper function of   [x] LE: / Lumbar core, proximal hip and LE with self care and ADLs  [] UE / Cervical: cervical, postural, scapular, scapulothoracic and UE control with self care, carrying, lifting, driving, computer work.   [] (38667) Gait Re-education- Provided training and instruction to the patient for proper LE, core and proximal hip recruitment and positioning and eccentric body weight control with ambulation re-education including up and down stairs     Home Management Training / Self Care:  [] (24973) Provided self-care/home management training related to activities of daily living and compensatory training, and/or use of adaptive equipment for improvement with: ADLs and compensatory training, meal preparation, safety procedures and instruction in use of adaptive equipment, including bathing, grooming, dressing, personal hygiene, basic household cleaning and chores. Home Exercise Program:    [x] (19117) Reviewed/Progressed HEP activities related to strengthening, flexibility, endurance, ROM of   [x] LE / Lumbar: core, proximal hip and LE for functional self-care, mobility, lifting and ambulation/stair navigation   [] UE / Cervical: cervical, postural, scapular, scapulothoracic and UE control with self care, reaching, carrying, lifting, house/yardwork, driving, computer work  [] (41609)Reviewed/Progressed HEP activities related to improving balance, coordination, kinesthetic sense, posture, motor skill, proprioception of   [] LE: core, proximal hip and LE for self care, mobility, lifting, and ambulation/stair navigation    [] UE / Cervical: cervical, postural,  scapular, scapulothoracic and UE control with self care, reaching, carrying, lifting, house/yardwork, driving, computer work    Manual Treatments:  PROM / STM / Oscillations-Mobs:  G-I, II, III, IV (PA's, Inf., Post.)  [] (59106) Provided manual therapy to mobilize LE, proximal hip and/or LS spine soft tissue/joints for the purpose of modulating pain, promoting relaxation,  increasing ROM, reducing/eliminating soft tissue swelling/inflammation/restriction, improving soft tissue extensibility and allowing for proper ROM for normal function with   [] LE / lumbar: self care, mobility, lifting and ambulation. [] UE / Cervical: self care, reaching, carrying, lifting, house/yardwork, driving, computer work.      Modalities:  [] (18389) Vasopneumatic compression: Utilized vasopneumatic compression to decrease edema / swelling for the purpose of improving mobility and quad tone / recruitment which will allow for increased overall function including but not limited to self-care, transfers, ambulation, and ascending / descending stairs. Charges:  Timed Code Treatment Minutes: 45   Total Treatment Minutes: 45     [] EVAL - LOW (12819)   [] EVAL - MOD (02482)  [] EVAL - HIGH (99491)  [] RE-EVAL (50171)  [x] XB(20508) x 1       [] Ionto  [x] NMR (08277) x1       [] Vaso  [] Manual (27358) x       [] Ultrasound  [x] TA x   1              [] Mech Traction (16396)  [] Aquatic Therapy x     [] ES (un) (75474):   [] Home Management Training x  [] ES(attended) (21942)   [] Dry Needling 1-2 muscles (03692):  [] Dry Needling 3+ muscles (847156)  [] Group:      [] Other:     GOALS:  Patient stated goal: posture training and some strength  []? Progressing: []? Met: []? Not Met: []? Adjusted     Therapist goals for Patient:   Short Term Goals: To be achieved in: 2 weeks  1. Independent in HEP and progression per patient tolerance, in order to prevent re-injury. []? Progressing: []? Met: []? Not Met: []? Adjusted  2. Patient will have a decrease in pain to <5/10 on average facilitate improvement in movement, function, and ADLs as indicated by Functional Deficits. []? Progressing: []? Met: []? Not Met: []? Adjusted     Long Term Goals: To be achieved in: 4 weeks  1. Disability index score of 44% or less for the NDI and/or DEENA to assist with reaching prior level of function. []? Progressing: []? Met: []? Not Met: []? Adjusted  2. Patient will demonstrate increased AROM to FUNCTIONAL (FOR THE PATIENT) and WITHOUT PAIN of thoracic & lumbar spine for proper joint functioning as indicated by patients Functional Deficits. []? Progressing: []? Met: []? Not Met: []? Adjusted  3. Patient will demonstrate an increase in postural awareness and control IN STANDING FOR UP TO 60 SECONDS to allow for proper functional mobility as indicated by patients Functional Deficits. []? Progressing: []? Met: []? Not Met: []? Adjusted  4. Patient will demonstrate an increase in Strength to >4/5 proximal hip and core activation >6/10 tvAbd to allow for proper functional mobility as indicated by patients Functional Deficits. []? Progressing: []? Met: []? Not Met: []? Adjusted  5. Patient will return to functional activities including bending over sink to wash dishes or brush teeth without increased symptoms or restriction. []? Progressing: []? Met: []? Not Met: []? Adjusted    Overall Progression Towards Functional goals/ Treatment Progress Update:  [] Patient is progressing as expected towards functional goals listed. [] Progression is slowed due to complexities/Impairments listed. [] Progression has been slowed due to co-morbidities. [x] Plan just implemented, too soon to assess goals progression <30days   [] Goals require adjustment due to lack of progress  [] Patient is not progressing as expected and requires additional follow up with physician  [] Other    Persisting Functional Limitations/Impairments:  [x]Sleeping []Sitting               [x]Standing [x]Transfers        [x]Walking []Kneeling               [x]Stairs [x]Squatting / bending   [x]ADLs [x]Reaching  [x]Lifting  [x]Housework  []Driving []Job related tasks  []Sports/Recreation []Other:        ASSESSMENT: Pt responded well to VCs and TCs for neutral posture. Able to maintain neutral for short periods of time. Pt asked for hand outs of exercises. Also reviewed rolling in bed, laying on L side and in supine for sleeping. Will continue to stretch, stabilize and work on neutral postioning. Treatment/Activity Tolerance:  [] Patient able to complete tx  [] Patient limited by fatigue  [x] Patient limited by pain  [] Patient limited by other medical complications  [] Other:     Prognosis: [x] Good [x] Fair  [] Poor    Patient Requires Follow-up: [x] Yes  [] No    Plan for next treatment session: assess repsonse to HEP.  Begin with simple stretches and mobility for T/S, BUEs, L/S, BLEs. BEGIN GENTLE ACTIVATION OF POSTERIOR RTC & POSTERIOR SCAPULAR & THORACIC  MUSCULATURE   ASSESS STRENGTH OF BUEs, MT, RHOMBOIDS. ASSESS FLEXIBILITY OF HAMSTRING AND LATS. PLAN: See eval. PT 2x / week for 4 weeks. [x] Continue per plan of care [] Alter current plan (see comments)  [] Plan of care initiated [] Hold pending MD visit [] Discharge    Electronically signed by: Carol Julien, PT, DPT    Note: If patient does not return for scheduled/ recommended follow up visits, this note will serve as a discharge from care along with most recent update on progress.

## 2021-10-26 ENCOUNTER — HOSPITAL ENCOUNTER (OUTPATIENT)
Dept: CT IMAGING | Age: 71
Discharge: HOME | End: 2021-10-26
Payer: MEDICARE

## 2021-10-26 DIAGNOSIS — C34.91 METASTATIC PRIMARY LUNG CANCER, RIGHT (HCC): ICD-10-CM

## 2021-10-26 PROCEDURE — 74177 CT ABD & PELVIS W/CONTRAST: CPT

## 2021-10-26 PROCEDURE — 6360000004 HC RX CONTRAST MEDICATION: Performed by: INTERNAL MEDICINE

## 2021-10-26 RX ADMIN — IOHEXOL 50 ML: 240 INJECTION, SOLUTION INTRATHECAL; INTRAVASCULAR; INTRAVENOUS; ORAL at 13:58

## 2021-10-26 RX ADMIN — IOPAMIDOL 75 ML: 755 INJECTION, SOLUTION INTRAVENOUS at 13:58

## 2021-10-29 ENCOUNTER — HOSPITAL ENCOUNTER (OUTPATIENT)
Dept: PHYSICAL THERAPY | Age: 71
Setting detail: THERAPIES SERIES
Discharge: HOME | End: 2021-10-29
Payer: MEDICARE

## 2021-10-29 PROCEDURE — 97112 NEUROMUSCULAR REEDUCATION: CPT

## 2021-10-29 PROCEDURE — 97110 THERAPEUTIC EXERCISES: CPT

## 2021-10-29 PROCEDURE — 97140 MANUAL THERAPY 1/> REGIONS: CPT

## 2021-10-29 NOTE — FLOWSHEET NOTE
[address] [facility] Physical Therapy  Phone: [phone]   Fax: [phone]    Physical Therapy Daily Treatment Note  Date:  10/29/2021    Patient Name:  Charley Guillermo    :  1950  MRN: [id number]  Medical/Treatment Diagnosis Information:  · Diagnosis: M51.34 (ICD-10-CM) - Other intervertebral disc degeneration, thoracic region  · Treatment Diagnosis: midback pain, low back pain, core weakness, decreased activity tolerance, impaired posture  Insurance/Certification information:  PT Insurance Information: medicare & [facility] aarp  Physician Information:  Referring Practitioner: Krista Edward MD  Plan of care signed (Y/N): []  Yes [x]  No     Date of Patient follow up with Physician:      Progress Report: []  Yes  [x]  No     Date Range for reporting period:  Beginning: 10/20/21  Ending: TBD    Progress report due (10 Rx/or 30 days whichever is less): visit #8 or /    Recertification due (POC duration/ or 90 days whichever is less): visit #8    Visit # Insurance Allowable Auth required? Date Range   3/8 PMN []  Yes  [x]  No        Latex Allergy:  [x]NO      []YES  Preferred Language for Healthcare:   [x]English       []other:    Functional Scale:        Date assessed:  DEENA: raw score = 27/50; dysfunction = 54%   10/20/21    Pain level:  5/10     SUBJECTIVE: Pt reports her pain was high in the middle of the night so she got up and took a hot shower which helped some. Went to [employer] and put away groceries this morning as well. Had a CT scan - results were good.       OBJECTIVE:     RESTRICTIONS/PRECAUTIONS: R lung tumor    Exercises/Interventions:     Therapeutic Exercises (93852) Resistance / level Sets/sec Reps Notes   UBE Level 1   2 min forward, 2 min retro     SEATED T/S ROTATION R/L      STANDING SCAP RETRACTION      SEATED T/L SIDEBEND      SEATED ANTERIOR PELVIC TILT             SB flexion roll ups with PPT  SB rainbows on wall   Thor ext over SB on wall  Red  Red    10 sec x10           Cables:  Mid row  LPD   1.5 pl  2.5 pl   1  1   x15  x15                                Therapeutic Activities (27133)       Sit to side lying  Side lying to supine  Supine to side lying  Side lying to sit       Reviewed pillow position for laying supine and on her L side                            Neuromuscular Re-ed (38349)       Thor ext over small bolster in sitting  Scap retraction around small bolster in sitting   B ER with scap retract in sitting        Orange   1  1    1 x15  x15    x15    pec stretch in door frame   10 sec x5    1/2 foam roll along spine with PPT and chin tuck      1/2 foam roll along spine with CT and UE flexion   10 sec      1 x10      x15                         Manual Intervention (42232)       DTM to L UT in sitting    X 10 min                                          Modalities:   10/29: MHP to UTs B in sitting x 10 min     Pt. Education:  -patient educated on diagnosis, prognosis and expectations for rehab  -all patient questions were answered    Home Exercise Program:  Access Code: [email or web address]  URL: [email or web address]  Date: 10/22/2021  Prepared by: Jen Mackenzie    Exercises  Thor ext with SB on wall  - 1 x daily - 7 x weekly - 3 sets - 10 reps  BS roll up on wall - 1 x daily - 7 x weekly - 3 sets - 10 reps  Seated Thoracic Lumbar Extension - 1 x daily - 7 x weekly - 3 sets - 10 reps  Doorway Pec Stretch at 120 Degrees Abduction - 1 x daily - 7 x weekly - 3 sets - 10 reps  Seated Scapular Retraction - 1 x daily - 7 x weekly - 3 sets - 10 reps  Seated Bilateral Shoulder External Rotation with Resistance - 1 x daily - 7 x weekly - 3 sets - 10 reps      Access Code: [email or web address]  URL: [email or web address]  Date: 10/20/2021  Prepared by: Army Bond     Exercises  Seated Thoracic Flexion and Rotation with Arms Crossed - 2-3 x daily - 6 x weekly - 1 sets - 8 reps - 3 hold  Standing Scapular Retraction - 2-3 x daily - 6 x weekly - 1 sets - 8 reps - 3 hold  Seated Anterior Pelvic Tilt - 2-3 x daily - 6 x weekly - 1 sets - 8 reps - 3 hold  Seated Sidebending - 2-3 x daily - 6 x weekly - 1 sets - 8 reps - 3 hold    Therapeutic Exercise and NMR EXR  [x] (49877) Provided verbal/tactile cueing for activities related to strengthening, flexibility, endurance, ROM for improvements in  [] LE / Lumbar: LE, proximal hip, and core control with self care, mobility, lifting, ambulation. [x] UE / Cervical: cervical, postural, scapular, scapulothoracic and UE control with self care, reaching, carrying, lifting, house/yardwork, driving, computer work.  [] (98418) Provided verbal/tactile cueing for activities related to improving balance, coordination, kinesthetic sense, posture, motor skill, proprioception to assist with   [] LE / lumbar: LE, proximal hip, and core control in self care, mobility, lifting, ambulation and eccentric single leg control. [] UE / cervical: cervical, scapular, scapulothoracic and UE control with self care, reaching, carrying, lifting, house/yardwork, driving, computer work.   [] (26746) Therapist is in constant attendance of 2 or more patients providing skilled therapy interventions, but not providing any significant amount of measurable one-on-one time to either patient, for improvements in  [] LE / lumbar: LE, proximal hip, and core control in self care, mobility, lifting, ambulation and eccentric single leg control. [] UE / cervical: cervical, scapular, scapulothoracic and UE control with self care, reaching, carrying, lifting, house/yardwork, driving, computer work.      NMR and Therapeutic Activities:    [x] (76129 or 74100) Provided verbal/tactile cueing for activities related to improving balance, coordination, kinesthetic sense, posture, motor skill, proprioception and motor activation to allow for proper function of   [] LE: / Lumbar core, proximal hip and LE with self care and ADLs  [x] UE / Cervical: cervical, postural, scapular, scapulothoracic and UE control with self care, carrying, lifting, driving, computer work.   [] (85360) Gait Re-education- Provided training and instruction to the patient for proper LE, core and proximal hip recruitment and positioning and eccentric body weight control with ambulation re-education including up and down stairs     Home Management Training / Self Care:  [] (67516) Provided self-care/home management training related to activities of daily living and compensatory training, and/or use of adaptive equipment for improvement with: ADLs and compensatory training, meal preparation, safety procedures and instruction in use of adaptive equipment, including bathing, grooming, dressing, personal hygiene, basic household cleaning and chores.      Home Exercise Program:    [] (20561) Reviewed/Progressed HEP activities related to strengthening, flexibility, endurance, ROM of   [] LE / Lumbar: core, proximal hip and LE for functional self-care, mobility, lifting and ambulation/stair navigation   [] UE / Cervical: cervical, postural, scapular, scapulothoracic and UE control with self care, reaching, carrying, lifting, house/yardwork, driving, computer work  [] (41242)Reviewed/Progressed HEP activities related to improving balance, coordination, kinesthetic sense, posture, motor skill, proprioception of   [] LE: core, proximal hip and LE for self care, mobility, lifting, and ambulation/stair navigation    [] UE / Cervical: cervical, postural,  scapular, scapulothoracic and UE control with self care, reaching, carrying, lifting, house/yardwork, driving, computer work    Manual Treatments:  PROM / STM / Oscillations-Mobs:  G-I, II, III, IV (PA's, Inf., Post.)  [x] (09554) Provided manual therapy to mobilize LE, proximal hip and/or LS spine soft tissue/joints for the purpose of modulating pain, promoting relaxation,  increasing ROM, reducing/eliminating soft tissue swelling/inflammation/restriction, improving soft tissue extensibility and allowing for proper ROM for normal function with   [] LE / lumbar: self care, mobility, lifting and ambulation. [x] UE / Cervical: self care, reaching, carrying, lifting, house/yardwork, driving, computer work. Modalities:  [] (44864) Vasopneumatic compression: Utilized vasopneumatic compression to decrease edema / swelling for the purpose of improving mobility and quad tone / recruitment which will allow for increased overall function including but not limited to self-care, transfers, ambulation, and ascending / descending stairs. Charges:  Timed Code Treatment Minutes: 43   Total Treatment Minutes: 53     [] EVAL - LOW (15758)   [] EVAL - MOD (84641)  [] EVAL - HIGH (70600)  [] RE-EVAL (61607)  [x] SI(38029) x 1       [] Ionto  [x] NMR (70060) x1       [] Vaso  [x] Manual (95854) x  1     [] Ultrasound  [] TA x                [] Mech Traction (07788)  [] Aquatic Therapy x     [] ES (un) (25331):   [] Home Management Training x  [] ES(attended) (05960)   [] Dry Needling 1-2 muscles (56259):  [] Dry Needling 3+ muscles (402932)  [] Group:      [] Other:     GOALS:  Patient stated goal: posture training and some strength  []? Progressing: []? Met: []? Not Met: []? Adjusted     Therapist goals for Patient:   Short Term Goals: To be achieved in: 2 weeks  1. Independent in HEP and progression per patient tolerance, in order to prevent re-injury. []? Progressing: []? Met: []? Not Met: []? Adjusted  2. Patient will have a decrease in pain to <5/10 on average facilitate improvement in movement, function, and ADLs as indicated by Functional Deficits. []? Progressing: []? Met: []? Not Met: []? Adjusted     Long Term Goals: To be achieved in: 4 weeks  1. Disability index score of 44% or less for the NDI and/or DEENA to assist with reaching prior level of function. []? Progressing: []? Met: []? Not Met: []? Adjusted  2.  Patient will demonstrate increased AROM to FUNCTIONAL (FOR THE PATIENT) and WITHOUT PAIN of thoracic & lumbar spine for proper joint functioning as indicated by patients Functional Deficits. []? Progressing: []? Met: []? Not Met: []? Adjusted  3. Patient will demonstrate an increase in postural awareness and control IN STANDING FOR UP TO 60 SECONDS to allow for proper functional mobility as indicated by patients Functional Deficits. []? Progressing: []? Met: []? Not Met: []? Adjusted  4. Patient will demonstrate an increase in Strength to >4/5 proximal hip and core activation >6/10 tvAbd to allow for proper functional mobility as indicated by patients Functional Deficits. []? Progressing: []? Met: []? Not Met: []? Adjusted  5. Patient will return to functional activities including bending over sink to wash dishes or brush teeth without increased symptoms or restriction. []? Progressing: []? Met: []? Not Met: []? Adjusted    Overall Progression Towards Functional goals/ Treatment Progress Update:  [] Patient is progressing as expected towards functional goals listed. [] Progression is slowed due to complexities/Impairments listed. [] Progression has been slowed due to co-morbidities. [x] Plan just implemented, too soon to assess goals progression <30days   [] Goals require adjustment due to lack of progress  [] Patient is not progressing as expected and requires additional follow up with physician  [] Other    Persisting Functional Limitations/Impairments:  [x]Sleeping []Sitting               [x]Standing [x]Transfers        [x]Walking []Kneeling               [x]Stairs [x]Squatting / bending   [x]ADLs [x]Reaching  [x]Lifting  [x]Housework  []Driving []Job related tasks  []Sports/Recreation []Other:        ASSESSMENT: Pt still holding L scap in elevation and protraction. Great relief felt with DTM to L UT. Struggles with spine neutral, but brian to achieve good chin tuck. Plan to continue as above.        Treatment/Activity Tolerance:  [] Patient able to complete tx  [] Patient limited by fatigue  [x] Patient limited by pain  [] Patient limited by other medical complications  [] Other:     Prognosis: [x] Good [x] Fair  [] Poor    Patient Requires Follow-up: [x] Yes  [] No    Plan for next treatment session: assess repsonse to HEP. Begin with simple stretches and mobility for T/S, BUEs, L/S, BLEs. BEGIN GENTLE ACTIVATION OF POSTERIOR RTC & POSTERIOR SCAPULAR & THORACIC  MUSCULATURE   ASSESS STRENGTH OF BUEs, MT, RHOMBOIDS. ASSESS FLEXIBILITY OF HAMSTRING AND LATS. PLAN: See eval. PT 2x / week for 4 weeks. [x] Continue per plan of care [] Alter current plan (see comments)  [] Plan of care initiated [] Hold pending MD visit [] Discharge    Electronically signed by: Erma Mei PT, DPT    Note: If patient does not return for scheduled/ recommended follow up visits, this note will serve as a discharge from care along with most recent update on progress.

## 2021-11-02 ENCOUNTER — HOSPITAL ENCOUNTER (OUTPATIENT)
Dept: PHYSICAL THERAPY | Age: 71
Setting detail: THERAPIES SERIES
Discharge: HOME | End: 2021-11-02
Payer: MEDICARE

## 2021-11-02 PROCEDURE — 97140 MANUAL THERAPY 1/> REGIONS: CPT

## 2021-11-02 PROCEDURE — 97530 THERAPEUTIC ACTIVITIES: CPT

## 2021-11-02 PROCEDURE — 97110 THERAPEUTIC EXERCISES: CPT

## 2021-11-02 NOTE — FLOWSHEET NOTE
[address] [facility] Physical Therapy  Phone: [phone]   Fax: [phone]    Physical Therapy Daily Treatment Note  Date:  2021    Patient Name:  Rosalia Rivas    :  1950  MRN: [id number]  Medical/Treatment Diagnosis Information:  · Diagnosis: M51.34 (ICD-10-CM) - Other intervertebral disc degeneration, thoracic region  · Treatment Diagnosis: midback pain, low back pain, core weakness, decreased activity tolerance, impaired posture  Insurance/Certification information:  PT Insurance Information: Medicare & [facility] aarp  Physician Information:  Referring Practitioner: Javi Mendoza MD  Plan of care signed (Y/N): []  Yes [x]  No     Date of Patient follow up with Physician:      Progress Report: []  Yes  [x]  No     Date Range for reporting period:  Beginning: 10/20/21  Ending: TBD    Progress report due (10 Rx/or 30 days whichever is less): visit #8 or     Recertification due (POC duration/ or 90 days whichever is less): visit #8    Visit # Insurance Allowable Auth required? Date Range    PMN []  Yes  [x]  No        Latex Allergy:  [x]NO      []YES  Preferred Language for Healthcare:   [x]English       []other:    Functional Scale:        Date assessed:  DEENA: raw score = 27/50; dysfunction = 54%   10/20/21    Pain level:  5/10     SUBJECTIVE: Patient states that she is feeling very sore today and her pain meds are not helping her. Patient states that she took a hot shower and used Aspercreme to try and manage the pain. Patient did not feel sore after last session. OBJECTIVE: Patient ambulates with forward head and rounded shoulders. Kyphosis at T-spine.      RESTRICTIONS/PRECAUTIONS: R lung tumor    Exercises/Interventions:     Therapeutic Exercises (77793) Resistance / level Sets/sec Reps Notes   UBE Level 1   2 min forward, 2 min retro     SEATED T/S ROTATION R/L      STANDING SCAP RETRACTION      SEATED T/L SIDEBEND      SEATED ANTERIOR PELVIC TILT SB flexion roll ups with PPT  SB rainbows on wall   Thor ext over SB on wall  Red  Red   Red 1  10 sec x10x10  2x30\"           Cables:  Mid row  LPD   1.5 pl  2.5 pl   1  1   x15  x15                                Therapeutic Activities (21650)       Sit to side lying  Side lying to supine  Supine to side lying  Side lying to sit       Reviewed pillow position for laying supine and on her L side       Crate carries   Crate squat to chair  4#  4# 160 ft    10x x3 set downs of crate    Cabinet reach  2#  10x B           Neuromuscular Re-ed (18032)       Purdin [place] ext over small bolster in sitting  Scap retraction around small bolster in sitting   B ER with scap retract in sitting     pec stretch in door frame     1/2 foam roll along spine with PPT and chin tuck      1/2 foam roll along spine with CT and UE flexion                          Manual Intervention (30046)       DTM to L UT in sitting    X 10 min                                          Modalities:   10/29: MHP to UTs B in sitting x 10 min     Pt. Education:  -patient educated on diagnosis, prognosis and expectations for rehab  -all patient questions were answered    Home Exercise Program:  Access Code: [email or web address]  URL: [email or web address]  Date: 10/22/2021  Prepared by: Saroj Heart    Exercises  Thor ext with SB on wall  - 1 x daily - 7 x weekly - 3 sets - 10 reps  BS roll up on wall - 1 x daily - 7 x weekly - 3 sets - 10 reps  Seated Thoracic Lumbar Extension - 1 x daily - 7 x weekly - 3 sets - 10 reps  Doorway Pec Stretch at 120 Degrees Abduction - 1 x daily - 7 x weekly - 3 sets - 10 reps  Seated Scapular Retraction - 1 x daily - 7 x weekly - 3 sets - 10 reps  Seated Bilateral Shoulder External Rotation with Resistance - 1 x daily - 7 x weekly - 3 sets - 10 reps      Access Code: [email or web address]  URL: [email or web address]  Date: 10/20/2021  Prepared by: Tessie Go     Exercises  Seated Thoracic Flexion and Rotation with Arms Crossed - 2-3 x daily - 6 x weekly - 1 sets - 8 reps - 3 hold  Standing Scapular Retraction - 2-3 x daily - 6 x weekly - 1 sets - 8 reps - 3 hold  Seated Anterior Pelvic Tilt - 2-3 x daily - 6 x weekly - 1 sets - 8 reps - 3 hold  Seated Sidebending - 2-3 x daily - 6 x weekly - 1 sets - 8 reps - 3 hold    Therapeutic Exercise and NMR EXR  [x] (82346) Provided verbal/tactile cueing for activities related to strengthening, flexibility, endurance, ROM for improvements in  [] LE / Lumbar: LE, proximal hip, and core control with self care, mobility, lifting, ambulation. [x] UE / Cervical: cervical, postural, scapular, scapulothoracic and UE control with self care, reaching, carrying, lifting, house/yardwork, driving, computer work.  [] (48089) Provided verbal/tactile cueing for activities related to improving balance, coordination, kinesthetic sense, posture, motor skill, proprioception to assist with   [] LE / lumbar: LE, proximal hip, and core control in self care, mobility, lifting, ambulation and eccentric single leg control. [] UE / cervical: cervical, scapular, scapulothoracic and UE control with self care, reaching, carrying, lifting, house/yardwork, driving, computer work.   [] (26679) Therapist is in constant attendance of 2 or more patients providing skilled therapy interventions, but not providing any significant amount of measurable one-on-one time to either patient, for improvements in  [] LE / lumbar: LE, proximal hip, and core control in self care, mobility, lifting, ambulation and eccentric single leg control. [] UE / cervical: cervical, scapular, scapulothoracic and UE control with self care, reaching, carrying, lifting, house/yardwork, driving, computer work.      NMR and Therapeutic Activities:    [x] (99918 or 94287) Provided verbal/tactile cueing for activities related to improving balance, coordination, kinesthetic sense, posture, motor skill, proprioception and motor activation to allow for proper function of [] LE: / Lumbar core, proximal hip and LE with self care and ADLs  [x] UE / Cervical: cervical, postural, scapular, scapulothoracic and UE control with self care, carrying, lifting, driving, computer work.   [] (67905) Gait Re-education- Provided training and instruction to the patient for proper LE, core and proximal hip recruitment and positioning and eccentric body weight control with ambulation re-education including up and down stairs     Home Management Training / Self Care:  [] (67900) Provided self-care/home management training related to activities of daily living and compensatory training, and/or use of adaptive equipment for improvement with: ADLs and compensatory training, meal preparation, safety procedures and instruction in use of adaptive equipment, including bathing, grooming, dressing, personal hygiene, basic household cleaning and chores.      Home Exercise Program:    [] (15500) Reviewed/Progressed HEP activities related to strengthening, flexibility, endurance, ROM of   [] LE / Lumbar: core, proximal hip and LE for functional self-care, mobility, lifting and ambulation/stair navigation   [] UE / Cervical: cervical, postural, scapular, scapulothoracic and UE control with self care, reaching, carrying, lifting, house/yardwork, driving, computer work  [] (23988)Reviewed/Progressed HEP activities related to improving balance, coordination, kinesthetic sense, posture, motor skill, proprioception of   [] LE: core, proximal hip and LE for self care, mobility, lifting, and ambulation/stair navigation    [] UE / Cervical: cervical, postural,  scapular, scapulothoracic and UE control with self care, reaching, carrying, lifting, house/yardwork, driving, computer work    Manual Treatments:  PROM / STM / Oscillations-Mobs:  G-I, II, III, IV (PA's, Inf., Post.)  [x] (30094) Provided manual therapy to mobilize LE, proximal hip and/or LS spine soft tissue/joints for the purpose of modulating pain, promoting relaxation,  increasing ROM, reducing/eliminating soft tissue swelling/inflammation/restriction, improving soft tissue extensibility and allowing for proper ROM for normal function with   [] LE / lumbar: self care, mobility, lifting and ambulation. [x] UE / Cervical: self care, reaching, carrying, lifting, house/yardwork, driving, computer work. Modalities:  [] (62447) Vasopneumatic compression: Utilized vasopneumatic compression to decrease edema / swelling for the purpose of improving mobility and quad tone / recruitment which will allow for increased overall function including but not limited to self-care, transfers, ambulation, and ascending / descending stairs. Charges:  Timed Code Treatment Minutes: 44   Total Treatment Minutes: 44     [] EVAL - LOW (01071)   [] EVAL - MOD (95363)  [] EVAL - HIGH (54525)  [] RE-EVAL (27834)  [x] KX(90871) x 1       [] Ionto  [] NMR (79601) x1       [] Vaso  [x] Manual (57443) x  1     [] Ultrasound  [x] TA x  1              [] Mech Traction (65114)  [] Aquatic Therapy x     [] ES (un) (06093):   [] Home Management Training x  [] ES(attended) (57105)   [] Dry Needling 1-2 muscles (83155):  [] Dry Needling 3+ muscles (068983)  [] Group:      [] Other:     GOALS:  Patient stated goal: posture training and some strength  [x]? Progressing: []? Met: []? Not Met: []? Adjusted     Therapist goals for Patient:   Short Term Goals: To be achieved in: 2 weeks  1. Independent in HEP and progression per patient tolerance, in order to prevent re-injury. [x]? Progressing: []? Met: []? Not Met: []? Adjusted  2. Patient will have a decrease in pain to <5/10 on average facilitate improvement in movement, function, and ADLs as indicated by Functional Deficits. [x]? Progressing: []? Met: []? Not Met: []? Adjusted     Long Term Goals: To be achieved in: 4 weeks  1. Disability index score of 44% or less for the NDI and/or DEENA to assist with reaching prior level of function. [x]? maintain appropriate core stability and posture. Patient still holding L scap in elevation and protraction. Added crate carries, crate squats, and cabinet reaches. Patient did well with these exercises and had no additional increase in pain. Patient continues to benefit from skilled physical therapy, will continue to treat per POC in order to improve posture and increase UE strength. Treatment/Activity Tolerance:  [] Patient able to complete tx  [] Patient limited by fatigue  [x] Patient limited by pain  [] Patient limited by other medical complications  [] Other:     Prognosis: [x] Good [x] Fair  [] Poor    Patient Requires Follow-up: [x] Yes  [] No    Plan for next treatment session: assess repsonse to HEP. Begin with simple stretches and mobility for T/S, BUEs, L/S, BLEs. BEGIN GENTLE ACTIVATION OF POSTERIOR RTC & POSTERIOR SCAPULAR & THORACIC  MUSCULATURE   ASSESS STRENGTH OF BUEs, MT, RHOMBOIDS. ASSESS FLEXIBILITY OF HAMSTRING AND LATS. PLAN: See eval. PT 2x / week for 4 weeks. [x] Continue per plan of care [] Alter current plan (see comments)  [] Plan of care initiated [] Hold pending MD visit [] Discharge    Electronically signed by: Shanelle Nava, PT, DPT  Therapist was present, directed the patient's care, made skilled judgement, and was responsible for assessment and treatment of the patient. Hugo Siddiqi    Note: If patient does not return for scheduled/ recommended follow up visits, this note will serve as a discharge from care along with most recent update on progress.

## 2021-11-05 ENCOUNTER — HOSPITAL ENCOUNTER (OUTPATIENT)
Dept: PHYSICAL THERAPY | Age: 71
Setting detail: THERAPIES SERIES
Discharge: HOME | End: 2021-11-05
Payer: MEDICARE

## 2021-11-05 PROCEDURE — 97140 MANUAL THERAPY 1/> REGIONS: CPT

## 2021-11-05 PROCEDURE — 97110 THERAPEUTIC EXERCISES: CPT

## 2021-11-05 PROCEDURE — 97530 THERAPEUTIC ACTIVITIES: CPT

## 2021-11-05 NOTE — FLOWSHEET NOTE
[address] [facility] Physical Therapy  Phone: [phone]   Fax: [phone]    Physical Therapy Daily Treatment Note  Date:  2021    Patient Name:  Devaughn Jorgensen    :  1950  MRN: [id number]  Medical/Treatment Diagnosis Information:  · Diagnosis: M51.34 (ICD-10-CM) - Other intervertebral disc degeneration, thoracic region  · Treatment Diagnosis: midback pain, low back pain, core weakness, decreased activity tolerance, impaired posture  Insurance/Certification information:  PT Insurance Information: Medicare & [place] aarp  Physician Information:  Referring Practitioner: Ge Grewal MD  Plan of care signed (Y/N): []  Yes [x]  No     Date of Patient follow up with Physician:      Progress Report: []  Yes  [x]  No     Date Range for reporting period:  Beginning: 10/20/21  Ending: TBD    Progress report due (10 Rx/or 30 days whichever is less): visit #8 or     Recertification due (POC duration/ or 90 days whichever is less): visit #8    Visit # Insurance Allowable Auth required? Date Range    PMN []  Yes  [x]  No        Latex Allergy:  [x]NO      []YES  Preferred Language for Healthcare:   [x]English       []other:    Functional Scale:        Date assessed:  DEENA: raw score = 27/50; dysfunction = 54%   10/20/21    Pain level:  4/10     SUBJECTIVE: Patient states her right rib cage was very painful the day after last session. \"I left here feeling great. \" She reports she notices improvement in energy levels. OBJECTIVE: Patient ambulates with forward head and rounded shoulders. Kyphosis at T-spine.      RESTRICTIONS/PRECAUTIONS: R lung tumor    Exercises/Interventions:     Therapeutic Exercises (64114) Resistance / level Sets/sec Reps Notes   UBE Level 1        Recumbent Bike L1, S8  4 min    SEATED T/S ROTATION R/L      STANDING SCAP RETRACTION      SEATED T/L SIDEBEND      SEATED ANTERIOR PELVIC TILT             SB flexion roll ups with PPT  SB rainbows on wall Thor ext over SB on wall  Red  Red   Red 1  10 sec x10x10  5x10\"           Cables:  Mid row  LPD   1.5 pl / (10#)  2.5 pl / (15#)   2  2   x10  x10   11/5 - modified sets/reps   TB BUE ER orange 2 10 11/5 - TE                        Therapeutic Activities (15061)       Sit to side lying  Side lying to supine  Supine to side lying  Side lying to sit       Reviewed pillow position for laying supine and on her L side       Crate carries   Crate squat to chair  4#    / 50 ft     x3 set downs of crate    Cabinet reach  2#  10x B 11/5 - head height RUE, chest height LUE          Neuromuscular Re-ed (62628)       Thor ext over small bolster in sitting  Scap retraction around small bolster in sitting       pec stretch in door frame     1/2 foam roll along spine with PPT and chin tuck      1/2 foam roll along spine with CT and UE flexion                          Manual Intervention (65319)       DTM to Bilateral UT, LS, pecs, teres minor in sitting    X 9 min                                          Modalities:   10/29: MHP to UTs B in sitting x 10 min     Pt. Education:  -patient educated on diagnosis, prognosis and expectations for rehab  -all patient questions were answered    Home Exercise Program:  Access Code: [email or web address]  URL: [email or web address]  Date: 10/22/2021  Prepared by: Leah Alan    Exercises  Thor ext with SB on wall  - 1 x daily - 7 x weekly - 3 sets - 10 reps  BS roll up on wall - 1 x daily - 7 x weekly - 3 sets - 10 reps  Seated Thoracic Lumbar Extension - 1 x daily - 7 x weekly - 3 sets - 10 reps  Doorway Pec Stretch at 120 Degrees Abduction - 1 x daily - 7 x weekly - 3 sets - 10 reps  Seated Scapular Retraction - 1 x daily - 7 x weekly - 3 sets - 10 reps  Seated Bilateral Shoulder External Rotation with Resistance - 1 x daily - 7 x weekly - 3 sets - 10 reps      Access Code: [email or web address]  URL: [email or web address]  Date: 10/20/2021  Prepared by: Jose Fat     Exercises  Seated Thoracic Flexion and Rotation with Arms Crossed - 2-3 x daily - 6 x weekly - 1 sets - 8 reps - 3 hold  Standing Scapular Retraction - 2-3 x daily - 6 x weekly - 1 sets - 8 reps - 3 hold  Seated Anterior Pelvic Tilt - 2-3 x daily - 6 x weekly - 1 sets - 8 reps - 3 hold  Seated Sidebending - 2-3 x daily - 6 x weekly - 1 sets - 8 reps - 3 hold    Therapeutic Exercise and NMR EXR  [x] (12041) Provided verbal/tactile cueing for activities related to strengthening, flexibility, endurance, ROM for improvements in  [] LE / Lumbar: LE, proximal hip, and core control with self care, mobility, lifting, ambulation. [x] UE / Cervical: cervical, postural, scapular, scapulothoracic and UE control with self care, reaching, carrying, lifting, house/yardwork, driving, computer work.  [] (67225) Provided verbal/tactile cueing for activities related to improving balance, coordination, kinesthetic sense, posture, motor skill, proprioception to assist with   [] LE / lumbar: LE, proximal hip, and core control in self care, mobility, lifting, ambulation and eccentric single leg control. [] UE / cervical: cervical, scapular, scapulothoracic and UE control with self care, reaching, carrying, lifting, house/yardwork, driving, computer work.   [] (81925) Therapist is in constant attendance of 2 or more patients providing skilled therapy interventions, but not providing any significant amount of measurable one-on-one time to either patient, for improvements in  [] LE / lumbar: LE, proximal hip, and core control in self care, mobility, lifting, ambulation and eccentric single leg control. [] UE / cervical: cervical, scapular, scapulothoracic and UE control with self care, reaching, carrying, lifting, house/yardwork, driving, computer work.      NMR and Therapeutic Activities:    [x] (23399 or 89736) Provided verbal/tactile cueing for activities related to improving balance, coordination, kinesthetic sense, posture, motor skill, proprioception and motor activation to allow for proper function of   [] LE: / Lumbar core, proximal hip and LE with self care and ADLs  [x] UE / Cervical: cervical, postural, scapular, scapulothoracic and UE control with self care, carrying, lifting, driving, computer work.   [] (61638) Gait Re-education- Provided training and instruction to the patient for proper LE, core and proximal hip recruitment and positioning and eccentric body weight control with ambulation re-education including up and down stairs     Home Management Training / Self Care:  [] (57351) Provided self-care/home management training related to activities of daily living and compensatory training, and/or use of adaptive equipment for improvement with: ADLs and compensatory training, meal preparation, safety procedures and instruction in use of adaptive equipment, including bathing, grooming, dressing, personal hygiene, basic household cleaning and chores.      Home Exercise Program:    [] (76455) Reviewed/Progressed HEP activities related to strengthening, flexibility, endurance, ROM of   [] LE / Lumbar: core, proximal hip and LE for functional self-care, mobility, lifting and ambulation/stair navigation   [] UE / Cervical: cervical, postural, scapular, scapulothoracic and UE control with self care, reaching, carrying, lifting, house/yardwork, driving, computer work  [] (67906)Reviewed/Progressed HEP activities related to improving balance, coordination, kinesthetic sense, posture, motor skill, proprioception of   [] LE: core, proximal hip and LE for self care, mobility, lifting, and ambulation/stair navigation    [] UE / Cervical: cervical, postural,  scapular, scapulothoracic and UE control with self care, reaching, carrying, lifting, house/yardwork, driving, computer work    Manual Treatments:  PROM / STM / Oscillations-Mobs:  G-I, II, III, IV (PA's, Inf., Post.)  [x] (05282) Provided manual therapy to mobilize LE, proximal hip and/or LS spine soft tissue/joints for the purpose of modulating pain, promoting relaxation,  increasing ROM, reducing/eliminating soft tissue swelling/inflammation/restriction, improving soft tissue extensibility and allowing for proper ROM for normal function with   [] LE / lumbar: self care, mobility, lifting and ambulation. [x] UE / Cervical: self care, reaching, carrying, lifting, house/yardwork, driving, computer work. Modalities:  [] (46947) Vasopneumatic compression: Utilized vasopneumatic compression to decrease edema / swelling for the purpose of improving mobility and quad tone / recruitment which will allow for increased overall function including but not limited to self-care, transfers, ambulation, and ascending / descending stairs. Charges:  Timed Code Treatment Minutes: 45   Total Treatment Minutes: 45     [] EVAL - LOW (54590)   [] EVAL - MOD (13271)  [] EVAL - HIGH (27064)  [] RE-EVAL (19109)  [x] ZD(13008) x 1       [] Ionto  [] NMR (38655) x1       [] Vaso  [x] Manual (52312) x  1     [] Ultrasound  [x] TA x  1              [] Mech Traction (37912)  [] Aquatic Therapy x      [] ES (un) (85021):   [] Home Management Training x  [] ES(attended) (76222)   [] Dry Needling 1-2 muscles (09226):  [] Dry Needling 3+ muscles (320667)  [] Group:      [] Other:     GOALS:  Patient stated goal: posture training and some strength  [x]? Progressing: []? Met: []? Not Met: []? Adjusted     Therapist goals for Patient:   Short Term Goals: To be achieved in: 2 weeks  1. Independent in HEP and progression per patient tolerance, in order to prevent re-injury. [x]? Progressing: []? Met: []? Not Met: []? Adjusted  2. Patient will have a decrease in pain to <5/10 on average facilitate improvement in movement, function, and ADLs as indicated by Functional Deficits. [x]? Progressing: []? Met: []? Not Met: []? Adjusted     Long Term Goals: To be achieved in: 4 weeks  1.  Disability index score of 44% or less for the NDI and/or DEENA to assist with reaching prior level of function. [x]? Progressing: []? Met: []? Not Met: []? Adjusted  2. Patient will demonstrate increased AROM to FUNCTIONAL (FOR THE PATIENT) and WITHOUT PAIN of thoracic & lumbar spine for proper joint functioning as indicated by patients Functional Deficits. [x]? Progressing: []? Met: []? Not Met: []? Adjusted  3. Patient will demonstrate an increase in postural awareness and control IN STANDING FOR UP TO 60 SECONDS to allow for proper functional mobility as indicated by patients Functional Deficits. [x]? Progressing: []? Met: []? Not Met: []? Adjusted  4. Patient will demonstrate an increase in Strength to >4/5 proximal hip and core activation >6/10 tvAbd to allow for proper functional mobility as indicated by patients Functional Deficits. [x]? Progressing: []? Met: []? Not Met: []? Adjusted  5. Patient will return to functional activities including bending over sink to wash dishes or brush teeth without increased symptoms or restriction. [x]? Progressing: []? Met: []? Not Met: []? Adjusted    Overall Progression Towards Functional goals/ Treatment Progress Update:  [] Patient is progressing as expected towards functional goals listed. [] Progression is slowed due to complexities/Impairments listed. [] Progression has been slowed due to co-morbidities.   [x] Plan just implemented, too soon to assess goals progression <30days   [] Goals require adjustment due to lack of progress  [] Patient is not progressing as expected and requires additional follow up with physician  [] Other    Persisting Functional Limitations/Impairments:  [x]Sleeping []Sitting               [x]Standing [x]Transfers        [x]Walking []Kneeling               [x]Stairs [x]Squatting / bending   [x]ADLs [x]Reaching  [x]Lifting  [x]Housework  []Driving []Job related tasks  []Sports/Recreation []Other:      ASSESSMENT: Interventions were continued this date with emphasis on scapular retraction, upright T/S posturing, promotion of trunk, spinal and BUE mobility. The patient still holds L scap in elevation and protraction, typically at rest (or in sitting). The patient had no additional increase in pain with activities, but did comment on increasing fatigue as session continued. The patient will continue to benefit from skilled physical therapy  in order to improve posture, increase UE strength, endurance and activity tolerance. Treatment/Activity Tolerance:  [x] Patient able to complete tx  [] Patient limited by fatigue  [] Patient limited by pain  [] Patient limited by other medical complications  [] Other:     Prognosis: [x] Good [x] Fair  [] Poor    Patient Requires Follow-up: [x] Yes  [] No    Plan for next treatment session: assess repsonse to HEP. Begin with simple stretches and mobility for T/S, BUEs, L/S, BLEs. CONTINUE GENTLE ACTIVATION OF POSTERIOR RTC & POSTERIOR SCAPULAR & THORACIC  MUSCULATURE   ASSESS STRENGTH OF BUEs, MT, RHOMBOIDS. ASSESS FLEXIBILITY OF HAMSTRING AND LATS. PLAN: See eval. PT 2x / week for 4 weeks. [x] Continue per plan of care [] Alter current plan (see comments)  [] Plan of care initiated [] Hold pending MD visit [] Discharge    Electronically signed by: Jaden Perales PT, DPT, omt-c    Note: If patient does not return for scheduled/ recommended follow up visits, this note will serve as a discharge from care along with most recent update on progress.

## 2021-11-09 ENCOUNTER — HOSPITAL ENCOUNTER (OUTPATIENT)
Dept: PHYSICAL THERAPY | Age: 71
Setting detail: THERAPIES SERIES
Discharge: HOME | End: 2021-11-09
Payer: MEDICARE

## 2021-11-09 PROCEDURE — 97110 THERAPEUTIC EXERCISES: CPT

## 2021-11-09 NOTE — FLOWSHEET NOTE
[address] [facility] Physical Therapy  Phone: [phone]   Fax: [phone]    Physical Therapy Daily Treatment Note  Date:  2021    Patient Name:  Shelia Lee    :  1950  MRN: [id number]  Medical/Treatment Diagnosis Information:  · Diagnosis: M51.34 (ICD-10-CM) - Other intervertebral disc degeneration, thoracic region  · Treatment Diagnosis: midback pain, low back pain, core weakness, decreased activity tolerance, impaired posture  Insurance/Certification information:  PT Insurance Information: medicare & [facility] aarp  Physician Information:  Referring Practitioner: Ginette Delgadillo MD  Plan of care signed (Y/N): [x]  Yes []  No     Date of Patient follow up with Physician:      Progress Report: []  Yes  [x]  No      Date Range for reporting period:  Beginning: 10/20/21  Ending: TBD    Progress report due (10 Rx/or 30 days whichever is less): visit #7/8 - POC NOTE FOR MORE PT VISITS (originally wasn't due until )    Recertification due (POC duration/ or 90 days whichever is less): visit #7/8    Visit # Insurance Allowable Auth required? Date Range    PMN []  Yes  [x]  No        Latex Allergy:  [x]NO      []YES  Preferred Language for Healthcare:   [x]English       []other:    Functional Scale:        Date assessed:  DEENA: raw score = 27/50; dysfunction = 54%   10/20/21    Pain level:  6/10     SUBJECTIVE: Patient states her right rib cage is much more tender today. She struggles with motivation at home in doing the exercises. She felt OK after last visit, but as of this morning, she has more tenderness in right side of ribcage. OBJECTIVE:    - improving tolerance to stretch; +1 TTP R rib 8, 9 intercostals  Patient ambulates with forward head and rounded shoulders. Kyphosis at T-spine.      RESTRICTIONS/PRECAUTIONS: R lung tumor    Exercises/Interventions:     Therapeutic Exercises (26734) Resistance / level Sets/sec Reps Notes   UBE Level 1  RPM 90  2 min forward, 2 min retro     Recumbent Bike    SEATED T/S ROTATION R/L      STANDING SCAP RETRACTION      SEATED T/L SIDEBEND  10'' 5x L    SEATED ANTERIOR PELVIC TILT      Standing PVC T/L:  Rotation R/L  Flexion/Extension    1  1   5  5 11/9 - added   SB flexion roll ups with PPT  SB rainbows on wall   Thor ext over SB on wall  Red  Red   Red 1   x10  5x10\"    Sidelying R Rib/T/L stretch w/ RUE overhead, also w/ open book stretch Table flexed at mid-section approx 20deg  5x 11/9 - added   Cables:  Mid row  LPD  High Row   1.5 pl / (10#)  2.5 pl / (15#)  10#   2  2  1   x10  x10  x10   11/5 - modified sets/reps  11/9 - added   TB BUE ER BUE ER @ WALL 3# dB 2 10 11/9 - added                 Therapeutic Activities (50622)       Sit to side lying  Side lying to supine  Supine to side lying  Side lying to sit       Reviewed pillow position for laying supine and on her L side  Crate carries   Crate squat to chair  Cabinet reach         Neuromuscular Re-ed (02168)       Thor ext over small bolster in sitting  Scap retraction around small bolster in sitting       pec stretch in door frame     1/2 foam roll along spine with PPT and chin tuck      1/2 foam roll along spine with CT and UE flexion                          Manual Intervention (54171)       DTM to Bilateral UT, LS, pecs, teres minor in sitting                                         Modalities:   10/29: MHP to UTs B in sitting x 10 min     Pt. Education:  -patient educated on diagnosis, prognosis and expectations for rehab  -all patient questions were answered    Home Exercise Program:  Access Code: [email or web address]  URL: [email or web address]  Date: 10/22/2021  Prepared by: Brandi Tomlin    Exercises  Thor ext with SB on wall  - 1 x daily - 7 x weekly - 3 sets - 10 reps  BS roll up on wall - 1 x daily - 7 x weekly - 3 sets - 10 reps  Seated Thoracic Lumbar Extension - 1 x daily - 7 x weekly - 3 sets - 10 reps  Doorway Pec Stretch at 120 Degrees Abduction - 1 x daily - 7 x weekly - 3 sets - 10 reps  Seated Scapular Retraction - 1 x daily - 7 x weekly - 3 sets - 10 reps  Seated Bilateral Shoulder External Rotation with Resistance - 1 x daily - 7 x weekly - 3 sets - 10 reps    Access Code: [email or web address]  URL: [email or web address]  Date: 10/20/2021  Prepared by: Tessie Go     Exercises  Seated Thoracic Flexion and Rotation with Arms Crossed - 2-3 x daily - 6 x weekly - 1 sets - 8 reps - 3 hold  Standing Scapular Retraction - 2-3 x daily - 6 x weekly - 1 sets - 8 reps - 3 hold  Seated Anterior Pelvic Tilt - 2-3 x daily - 6 x weekly - 1 sets - 8 reps - 3 hold  Seated Sidebending - 2-3 x daily - 6 x weekly - 1 sets - 8 reps - 3 hold    Therapeutic Exercise and NMR EXR  [x] (31917) Provided verbal/tactile cueing for activities related to strengthening, flexibility, endurance, ROM for improvements in  [] LE / Lumbar: LE, proximal hip, and core control with self care, mobility, lifting, ambulation. [x] UE / Cervical: cervical, postural, scapular, scapulothoracic and UE control with self care, reaching, carrying, lifting, house/yardwork, driving, computer work.  [] (55972) Provided verbal/tactile cueing for activities related to improving balance, coordination, kinesthetic sense, posture, motor skill, proprioception to assist with   [] LE / lumbar: LE, proximal hip, and core control in self care, mobility, lifting, ambulation and eccentric single leg control. [] UE / cervical: cervical, scapular, scapulothoracic and UE control with self care, reaching, carrying, lifting, house/yardwork, driving, computer work.   [] (05061) Therapist is in constant attendance of 2 or more patients providing skilled therapy interventions, but not providing any significant amount of measurable one-on-one time to either patient, for improvements in  [] LE / lumbar: LE, proximal hip, and core control in self care, mobility, lifting, ambulation and eccentric single leg control.    [] UE / cervical: cervical, scapular, scapulothoracic and UE control with self care, reaching, carrying, lifting, house/yardwork, driving, computer work. NMR and Therapeutic Activities:    [] (61706 or 77965) Provided verbal/tactile cueing for activities related to improving balance, coordination, kinesthetic sense, posture, motor skill, proprioception and motor activation to allow for proper function of   [] LE: / Lumbar core, proximal hip and LE with self care and ADLs  [] UE / Cervical: cervical, postural, scapular, scapulothoracic and UE control with self care, carrying, lifting, driving, computer work.   [] (74884) Gait Re-education- Provided training and instruction to the patient for proper LE, core and proximal hip recruitment and positioning and eccentric body weight control with ambulation re-education including up and down stairs     Home Management Training / Self Care:  [] (67441) Provided self-care/home management training related to activities of daily living and compensatory training, and/or use of adaptive equipment for improvement with: ADLs and compensatory training, meal preparation, safety procedures and instruction in use of adaptive equipment, including bathing, grooming, dressing, personal hygiene, basic household cleaning and chores.      Home Exercise Program:    [] (96314) Reviewed/Progressed HEP activities related to strengthening, flexibility, endurance, ROM of   [] LE / Lumbar: core, proximal hip and LE for functional self-care, mobility, lifting and ambulation/stair navigation   [] UE / Cervical: cervical, postural, scapular, scapulothoracic and UE control with self care, reaching, carrying, lifting, house/yardwork, driving, computer work  [] (55445)Reviewed/Progressed HEP activities related to improving balance, coordination, kinesthetic sense, posture, motor skill, proprioception of   [] LE: core, proximal hip and LE for self care, mobility, lifting, and ambulation/stair navigation    [] UE / Cervical: cervical, postural,  scapular, scapulothoracic and UE control with self care, reaching, carrying, lifting, house/yardwork, driving, computer work    Manual Treatments:  PROM / STM / Oscillations-Mobs:  G-I, II, III, IV (PA's, Inf., Post.)  [x] (43320) Provided manual therapy to mobilize LE, proximal hip and/or LS spine soft tissue/joints for the purpose of modulating pain, promoting relaxation,  increasing ROM, reducing/eliminating soft tissue swelling/inflammation/restriction, improving soft tissue extensibility and allowing for proper ROM for normal function with   [] LE / lumbar: self care, mobility, lifting and ambulation. [x] UE / Cervical: self care, reaching, carrying, lifting, house/yardwork, driving, computer work. Modalities:  [] (23409) Vasopneumatic compression: Utilized vasopneumatic compression to decrease edema / swelling for the purpose of improving mobility and quad tone / recruitment which will allow for increased overall function including but not limited to self-care, transfers, ambulation, and ascending / descending stairs. Charges:  Timed Code Treatment Minutes: 45   Total Treatment Minutes: 45     [] EVAL - LOW (20206)   [] EVAL - MOD (19627)  [] EVAL - HIGH (08957)  [] RE-EVAL (89505)  [x] RH(01450) x 3       [] Ionto  [] NMR (11333) x1       [] Vaso  [] Manual (25961) x  1     [] Ultrasound  [x] TA x  1              [] Mech Traction (89710)  [] Aquatic Therapy x      [] ES (un) (40540):   [] Home Management Training x  [] ES(attended) (57934)   [] Dry Needling 1-2 muscles (85479):  [] Dry Needling 3+ muscles (119521)  [] Group:      [] Other:     GOALS:  Patient stated goal: posture training and some strength  [x]? Progressing: []? Met: []? Not Met: []? Adjusted     Therapist goals for Patient:   Short Term Goals: To be achieved in: 2 weeks  1. Independent in HEP and progression per patient tolerance, in order to prevent re-injury. [x]? Progressing: []?  Met: []? Not Met: []? Adjusted  2. Patient will have a decrease in pain to <5/10 on average facilitate improvement in movement, function, and ADLs as indicated by Functional Deficits. [x]? Progressing: []? Met: []? Not Met: []? Adjusted     Long Term Goals: To be achieved in: 4 weeks  1. Disability index score of 44% or less for the NDI and/or DEENA to assist with reaching prior level of function. [x]? Progressing: []? Met: []? Not Met: []? Adjusted  2. Patient will demonstrate increased AROM to FUNCTIONAL (FOR THE PATIENT) and WITHOUT PAIN of thoracic & lumbar spine for proper joint functioning as indicated by patients Functional Deficits. [x]? Progressing: []? Met: []? Not Met: []? Adjusted  3. Patient will demonstrate an increase in postural awareness and control IN STANDING FOR UP TO 60 SECONDS to allow for proper functional mobility as indicated by patients Functional Deficits. [x]? Progressing: []? Met: []? Not Met: []? Adjusted  4. Patient will demonstrate an increase in Strength to >4/5 proximal hip and core activation >6/10 tvAbd to allow for proper functional mobility as indicated by patients Functional Deficits. [x]? Progressing: []? Met: []? Not Met: []? Adjusted  5. Patient will return to functional activities including bending over sink to wash dishes or brush teeth without increased symptoms or restriction. [x]? Progressing: []? Met: []? Not Met: []? Adjusted    Overall Progression Towards Functional goals/ Treatment Progress Update:  [] Patient is progressing as expected towards functional goals listed. [x] Progression is slowed due to complexities/Impairments listed. [] Progression has been slowed due to co-morbidities.   [] Plan just implemented, too soon to assess goals progression <30days   [] Goals require adjustment due to lack of progress  [] Patient is not progressing as expected and requires additional follow up with physician  [] Other    Persisting Functional Limitations/Impairments:  [x]Sleeping []Sitting               [x]Standing [x]Transfers        [x]Walking []Kneeling               [x]Stairs [x]Squatting / bending   [x]ADLs [x]Reaching  [x]Lifting  [x]Housework  []Driving []Job related tasks  []Sports/Recreation []Other:      ASSESSMENT: Interventions were continued this date with emphasis on rib, T/S mobility and stretching to improve posture and resting static positions. The patient still holds L scap in elevation and protraction, typically at rest (or in sitting). The patient had no additional increase in pain with resisted strengthening, and generally felt better after PT session. The patient will continue to benefit from skilled physical therapy  in order to improve posture, increase UE strength, endurance and activity tolerance and will need POC note next visit to ensure approval and schedule visits without break/gap in therapy regimen. Treatment/Activity Tolerance:  [x] Patient able to complete tx  [] Patient limited by fatigue  [] Patient limited by pain  [] Patient limited by other medical complications  [] Other:     Prognosis: [x] Good [x] Fair  [] Poor    Patient Requires Follow-up: [x] Yes  [] No    Plan for next treatment session: assess repsonse to HEP. Begin with simple stretches and mobility for T/S, BUEs, L/S, BLEs. CONTINUE GENTLE ACTIVATION OF POSTERIOR RTC & POSTERIOR SCAPULAR & THORACIC  MUSCULATURE   ASSESS STRENGTH OF BUEs, MT, RHOMBOIDS. ASSESS FLEXIBILITY OF HAMSTRING AND LATS. POC NOTE FOR PT EXTENSION    PLAN: See eval. PT 2x / week for 4 weeks. [x] Continue per plan of care [] Alter current plan (see comments)  [] Plan of care initiated [] Hold pending MD visit [] Discharge    Electronically signed by: Emy Dykes, PT, DPT, omt-c    Note: If patient does not return for scheduled/ recommended follow up visits, this note will serve as a discharge from care along with most recent update on progress.

## 2021-11-12 ENCOUNTER — HOSPITAL ENCOUNTER (OUTPATIENT)
Dept: PHYSICAL THERAPY | Age: 71
Setting detail: THERAPIES SERIES
Discharge: HOME | End: 2021-11-12
Payer: MEDICARE

## 2021-11-12 PROCEDURE — 97110 THERAPEUTIC EXERCISES: CPT

## 2021-11-12 PROCEDURE — 97140 MANUAL THERAPY 1/> REGIONS: CPT

## 2021-11-12 NOTE — PLAN OF CARE
Information:  Referring Practitioner: Tyesha Porter MD  Plan of care signed (Y/N): [x]  Yes []  No     Date of Patient follow up with Physician:      Progress Report: [x]  Yes  []  No      Date Range for reporting period:  Beginning: 10/20/21  POC: 11/12/21  Ending: TBD    Progress report due (10 Rx/or 30 days whichever is less): visit #16 or 72/83/33    Recertification due (POC duration/ or 90 days whichever is less): visit #16 or 2/12/22    Visit # Insurance Allowable Auth required? Date Range   7/8 + 0/8 (pending) PMN []  Yes  [x]  No 2021       Latex Allergy:  [x]NO      []YES  Preferred Language for Healthcare:   [x]English       []other:    Functional Scale:        Date assessed:  DEENA: raw score = 27/50; dysfunction = 54%   10/20/21  DEENA: raw score = 27/50; dysfunction = 54%   11/12/21      Pain level:  6/10     SUBJECTIVE: Patient states she felt good after last visit, but did some yard work afterwards and she has been paying for it for 2 days. Pain is in mid R rib cage. Has been icing and using heat at home. OBJECTIVE:   11/12:   AROM B GHJ-  Able to reach behind head  Able to reach behind back  R GHJ flexion 180 deg  L GHJ flexion 100 deg  R GHJ abd 180 deg   L GHJ abd 90 deg         11/9 - improving tolerance to stretch; +1 TTP R rib 8, 9 intercostals  Patient ambulates with forward head and rounded shoulders. Kyphosis at T-spine.      RESTRICTIONS/PRECAUTIONS: R lung tumor    Exercises/Interventions:     Therapeutic Exercises (79675) Resistance / level Sets/sec Reps Notes   UBE Level 1  RPM 90  2 min forward, 2 min retro  VCs for relax L scap   Recumbent Bike    SEATED T/S ROTATION R/L      STANDING SCAP RETRACTION      SEATED T/L SIDEBEND     SEATED ANTERIOR PELVIC TILT      Standing PVC T/L:  Rotation R/L  Flexion/Extension  Flexion to  90 deg    1    1   10    10 11/9 - added  11/12: PVC behind back   SB flexion roll ups with PPT  SB rainbows on wall   Thor ext over SB on wall     Sidelying R Rib/T/L stretch w/ RUE overhead, also w/ open book stretch 11/9 - added   Cables:  Mid row  LPD  High Row   2 pl / (10#)  2 pl / (10#)     1  1     x10  x10     11/5 - modified sets/reps  11/9 - added   TB BUE ER orange 2 10 11/5    BUE ER @ WALL 11/9 - added          W slides on wall   1 x10  11/12 - added                               Therapeutic Activities (63737)       Sit to side lying  Side lying to supine  Supine to side lying  Side lying to sit       Reviewed pillow position for laying supine and on her L side  Crate carries   Crate squat to chair  Cabinet reach         Neuromuscular Re-ed (55225)       Thor ext over small bolster in sitting  Scap retraction around small bolster in sitting       pec stretch in door frame     1/2 foam roll along spine with PPT and chin tuck      1/2 foam roll along spine with CT and UE flexion                          Manual Intervention (85786)       DTM to Bilateral UT, LS, pecs, teres minor in sitting    L sidelying over 2 pillows - STM to R intercostals from around T4 to T10 anteriorly and posteriorly    X 12 min                                  Modalities:   11/12: MHP x 5 min to L intercostals in L sidelying     10/29: MHP to UTs B in sitting x 10 min     Pt. Education:  11/12: Reassessed goals, discussed progress made and areas remaining for improvement, issued outcome measure and reviewed new score with pt as compared to eval, advised pt to avoid yard work for now since it flared her up so much, also educated pt on massage at intercostals and reminder to stand straight so not compression on R ribs    -patient educated on diagnosis, prognosis and expectations for rehab  -all patient questions were answered    Home Exercise Program:  Access Code: [email or web address]  URL: [email or web address]  Date: 10/22/2021  Prepared by: Elizabeth Pate    Exercises  Thor ext with SB on wall  - 1 x daily - 7 x weekly - 3 sets - 10 reps  BS roll up on wall - 1 x daily - 7 x weekly - 3 sets - 10 reps  Seated Thoracic Lumbar Extension - 1 x daily - 7 x weekly - 3 sets - 10 reps  Doorway Pec Stretch at 120 Degrees Abduction - 1 x daily - 7 x weekly - 3 sets - 10 reps  Seated Scapular Retraction - 1 x daily - 7 x weekly - 3 sets - 10 reps  Seated Bilateral Shoulder External Rotation with Resistance - 1 x daily - 7 x weekly - 3 sets - 10 reps    Access Code: [email or web address]  URL: [email or web address]  Date: 10/20/2021  Prepared by: Trish Edge     Exercises  Seated Thoracic Flexion and Rotation with Arms Crossed - 2-3 x daily - 6 x weekly - 1 sets - 8 reps - 3 hold  Standing Scapular Retraction - 2-3 x daily - 6 x weekly - 1 sets - 8 reps - 3 hold  Seated Anterior Pelvic Tilt - 2-3 x daily - 6 x weekly - 1 sets - 8 reps - 3 hold  Seated Sidebending - 2-3 x daily - 6 x weekly - 1 sets - 8 reps - 3 hold    Therapeutic Exercise and NMR EXR  [x] (00900) Provided verbal/tactile cueing for activities related to strengthening, flexibility, endurance, ROM for improvements in  [] LE / Lumbar: LE, proximal hip, and core control with self care, mobility, lifting, ambulation. [x] UE / Cervical: cervical, postural, scapular, scapulothoracic and UE control with self care, reaching, carrying, lifting, house/yardwork, driving, computer work.  [] (29256) Provided verbal/tactile cueing for activities related to improving balance, coordination, kinesthetic sense, posture, motor skill, proprioception to assist with   [] LE / lumbar: LE, proximal hip, and core control in self care, mobility, lifting, ambulation and eccentric single leg control.    [] UE / cervical: cervical, scapular, scapulothoracic and UE control with self care, reaching, carrying, lifting, house/yardwork, driving, computer work.   [] (31715) Therapist is in constant attendance of 2 or more patients providing skilled therapy interventions, but not providing any significant amount of measurable one-on-one time to either patient, for improvements in  [] LE / lumbar: LE, proximal hip, and core control in self care, mobility, lifting, ambulation and eccentric single leg control. [] UE / cervical: cervical, scapular, scapulothoracic and UE control with self care, reaching, carrying, lifting, house/yardwork, driving, computer work. NMR and Therapeutic Activities:    [] (43366 or 60986) Provided verbal/tactile cueing for activities related to improving balance, coordination, kinesthetic sense, posture, motor skill, proprioception and motor activation to allow for proper function of   [] LE: / Lumbar core, proximal hip and LE with self care and ADLs  [] UE / Cervical: cervical, postural, scapular, scapulothoracic and UE control with self care, carrying, lifting, driving, computer work.   [] (68562) Gait Re-education- Provided training and instruction to the patient for proper LE, core and proximal hip recruitment and positioning and eccentric body weight control with ambulation re-education including up and down stairs     Home Management Training / Self Care:  [] (89865) Provided self-care/home management training related to activities of daily living and compensatory training, and/or use of adaptive equipment for improvement with: ADLs and compensatory training, meal preparation, safety procedures and instruction in use of adaptive equipment, including bathing, grooming, dressing, personal hygiene, basic household cleaning and chores.      Home Exercise Program:    [] (29026) Reviewed/Progressed HEP activities related to strengthening, flexibility, endurance, ROM of   [] LE / Lumbar: core, proximal hip and LE for functional self-care, mobility, lifting and ambulation/stair navigation   [] UE / Cervical: cervical, postural, scapular, scapulothoracic and UE control with self care, reaching, carrying, lifting, house/yardwork, driving, computer work  [] (94428)Reviewed/Progressed HEP activities related to improving balance, coordination, kinesthetic sense, posture, motor skill, proprioception of   [] LE: core, proximal hip and LE for self care, mobility, lifting, and ambulation/stair navigation    [] UE / Cervical: cervical, postural,  scapular, scapulothoracic and UE control with self care, reaching, carrying, lifting, house/yardwork, driving, computer work    Manual Treatments:  PROM / STM / Oscillations-Mobs:  G-I, II, III, IV (PA's, Inf., Post.)  [x] (64787) Provided manual therapy to mobilize LE, proximal hip and/or LS spine soft tissue/joints for the purpose of modulating pain, promoting relaxation,  increasing ROM, reducing/eliminating soft tissue swelling/inflammation/restriction, improving soft tissue extensibility and allowing for proper ROM for normal function with   [] LE / lumbar: self care, mobility, lifting and ambulation. [x] UE / Cervical: self care, reaching, carrying, lifting, house/yardwork, driving, computer work. Modalities:  [] (57982) Vasopneumatic compression: Utilized vasopneumatic compression to decrease edema / swelling for the purpose of improving mobility and quad tone / recruitment which will allow for increased overall function including but not limited to self-care, transfers, ambulation, and ascending / descending stairs. Charges:  Timed Code Treatment Minutes: 45   Total Treatment Minutes: 45     [] EVAL - LOW (34292)   [] EVAL - MOD (03101)  [] EVAL - HIGH (47165)  [] RE-EVAL (64470)  [x] TK(20186) x 3       [] Ionto  [] NMR (79413) x1       [] Vaso  [] Manual (76419) x  1     [] Ultrasound  [x] TA x  1              [] Mech Traction (55468)  [] Aquatic Therapy x      [] ES (un) (73009):   [] Home Management Training x  [] ES(attended) (32742)   [] Dry Needling 1-2 muscles (36850):  [] Dry Needling 3+ muscles (217817)  [] Group:      [] Other:     GOALS:  Patient stated goal: posture training and some strength  [x]? Progressing: []? Met: []? Not Met: []? Adjusted     Therapist goals for Patient:   Short Term Goals:  To be achieved in: 2 weeks  1. Independent in HEP and progression per patient tolerance, in order to prevent re-injury. []? Progressing: [x]? Met: []? Not Met: []? Adjusted  2. Patient will have a decrease in pain to <5/10 on average facilitate improvement in movement, function, and ADLs as indicated by Functional Deficits. - about 6/10 this week   [x]? Progressing: []? Met: []? Not Met: []? Adjusted     Long Term Goals: To be achieved in: 4 weeks  1. Disability index score of 44% or less for the NDI and/or DEENA to assist with reaching prior level of function. - still 54% impaired   [x]? Progressing: []? Met: []? Not Met: []? Adjusted  2. Patient will demonstrate increased AROM to FUNCTIONAL (FOR THE PATIENT) and WITHOUT PAIN of thoracic & lumbar spine for proper joint functioning as indicated by patients Functional Deficits. []? Progressing: [x]? Met: []? Not Met: []? Adjusted  3. Patient will demonstrate an increase in postural awareness and control IN STANDING FOR UP TO 60 SECONDS to allow for proper functional mobility as indicated by patients Functional Deficits. []? Progressing: [x]? Met: []? Not Met: []? Adjusted  4. Patient will demonstrate an increase in Strength to >4/5 proximal hip and core activation >6/10 tvAbd to allow for proper functional mobility as indicated by patients Functional Deficits. - NOT ASSESSED  [x]? Progressing: []? Met: []? Not Met: []? Adjusted  5. Patient will return to functional activities including bending over sink to wash dishes or brush teeth without increased symptoms or restriction. [x]? Progressing: []? Met: []? Not Met: []? Adjusted    Overall Progression Towards Functional goals/ Treatment Progress Update:  [] Patient is progressing as expected towards functional goals listed. [x] Progression is slowed due to complexities/Impairments listed. [] Progression has been slowed due to co-morbidities.   [] Plan just implemented, too soon to assess goals progression <30days   [] Goals require adjustment due to lack of progress  [] Patient is not progressing as expected and requires additional follow up with physician  [] Other    Persisting Functional Limitations/Impairments:  [x]Sleeping []Sitting               [x]Standing [x]Transfers        [x]Walking []Kneeling               [x]Stairs [x]Squatting / bending   [x]ADLs [x]Reaching  [x]Lifting  [x]Housework  []Driving []Job related tasks  []Sports/Recreation []Other:      ASSESSMENT: Pt is a 65 y/o female who presented to therapy with complaints of right sided pain and faulty posture. She has now been seen for 7 visits as is making good progress. She is now much more aware of her posture and is able to find and hold a neutral position for short periods of time. She is also gaining strength and flexibility in GHJs and midback. Pt point tender at intercostals and states this is her main source of pain. Pain has not decreased significantly at this point. Pt would benefit form continued skilled therapy to address remaining deficits, reduce pain, further improve posture, and return to ADLs and house work without pain. Treatment/Activity Tolerance:  [x] Patient able to complete tx  [] Patient limited by fatigue  [] Patient limited by pain  [] Patient limited by other medical complications  [] Other:     Prognosis: [x] Good [x] Fair  [] Poor    Patient Requires Follow-up: [x] Yes  [] No    Plan for next treatment session: assess repsonse to HEP. Begin with simple stretches and mobility for T/S, BUEs, L/S, BLEs. CONTINUE GENTLE ACTIVATION OF POSTERIOR RTC & POSTERIOR SCAPULAR & THORACIC  MUSCULATURE   ASSESS STRENGTH OF BUEs, MT, RHOMBOIDS. ASSESS FLEXIBILITY OF HAMSTRING AND LATS. PLAN: See eval. PT 2x / week for 4 weeks.    [x] Continue per plan of care [] Alter current plan (see comments)  [] Plan of care initiated [] Hold pending MD visit [] Discharge    Electronically signed by: Guy Montejo, PT, DPT    Note: If patient does not return for scheduled/ recommended follow up visits, this note will serve as a discharge from care along with most recent update on progress.

## 2021-11-15 ENCOUNTER — TELEPHONE (OUTPATIENT)
Dept: INTERNAL MEDICINE CLINIC | Age: 71
End: 2021-11-15

## 2021-11-15 NOTE — TELEPHONE ENCOUNTER
----- Message from Adriel Vanegas sent at 11/15/2021  2:31 PM EST -----  Subject: Message to Provider    QUESTIONS  Information for Provider? pt is asking she has the flu shot when she comes   in on 11/22  ---------------------------------------------------------------------------  --------------  [address]  What is the best way for the office to contact you? OK to leave message on   voicemail  Preferred Call Back Phone Number? [phone]  ---------------------------------------------------------------------------  --------------  SCRIPT ANSWERS  Relationship to Patient?  Self

## 2021-11-16 ENCOUNTER — HOSPITAL ENCOUNTER (OUTPATIENT)
Dept: PHYSICAL THERAPY | Age: 71
Setting detail: THERAPIES SERIES
Discharge: HOME | End: 2021-11-16
Payer: MEDICARE

## 2021-11-16 PROCEDURE — 97140 MANUAL THERAPY 1/> REGIONS: CPT

## 2021-11-16 PROCEDURE — 97110 THERAPEUTIC EXERCISES: CPT

## 2021-11-16 NOTE — FLOWSHEET NOTE
[address] [facility] Physical Therapy  Phone: [phone]   Fax: [phone]    Physical Therapy Daily Treatment Note  Date:  2021    Patient Name:  Kelly Rodriguez    :  1950  MRN: [id number]  Medical/Treatment Diagnosis Information:  · Diagnosis: M51.34 (ICD-10-CM) - Other intervertebral disc degeneration, thoracic region  · Treatment Diagnosis: midback pain, low back pain, core weakness, decreased activity tolerance, impaired posture  Insurance/Certification information:  PT Insurance Information: medicare & [facility] aarp  Physician Information:  Referring Practitioner: Tyesha Porter MD  Plan of care signed (Y/N): [x]  Yes []  No     Date of Patient follow up with Physician:      Progress Report: []  Yes  [x]  No      Date Range for reporting period:  Beginning: 10/20/21  POC: 21  Ending: TBD    Progress report due (10 Rx/or 30 days whichever is less): visit #16 or     Recertification due (POC duration/ or 90 days whichever is less): visit #16 or 22    Visit # Insurance Allowable Auth required? Date Range    + 08 PMN []  Yes  [x]  No      Latex Allergy:  [x]NO      []YES  Preferred Language for Healthcare:   [x]English       []other:    Functional Scale:        Date assessed:  DEENA: raw score = 27/50; dysfunction = 54%   10/20/21  DEENA: raw score = 27/50; dysfunction = 54%   21    Pain level:  6/10     SUBJECTIVE: The patient reports she has worked past her soreness from yardwork last week. She comments she started to notice some swelling in right foot yesterday, but is unsure why/how this could be. She will see PCP on Monday to discuss this.     OBJECTIVE:   :   AROM B GHJ-  Able to reach behind head  Able to reach behind back  R GHJ flexion 180 deg  L GHJ flexion 100 deg  R GHJ abd 180 deg   L GHJ abd 90 deg      - improving tolerance to stretch; +1 TTP R rib 8, 9 intercostals  Patient ambulates with forward head and rounded shoulders. Kyphosis at T-spine.      RESTRICTIONS/PRECAUTIONS: R lung tumor    Exercises/Interventions:     Therapeutic Exercises (76005) Resistance / level Sets/sec Reps Notes   UBE Level 1  RPM 90  2 min forward, 2 min retro  VCs for relax L scap   Recumbent Bike    SEATED T/S ROTATION R/L      STANDING SCAP RETRACTION      SEATED T/L SIDEBEND     SEATED ANTERIOR PELVIC TILT      Standing PVC T/L:  Rotation R/L  Flexion/Extension  Flexion to  90 deg  BUE Extension           2         5 11/9 - added  11/12: PVC behind back   SB flexion roll ups with PPT  SB rainbows on wall   Thor ext over SB on wall     Sidelying R Rib/T/L stretch w/ RUE overhead, also w/ open book stretch 11/9 - added   Cables:  Mid row (overhand/underhand)  LPD  High Row   3 pl / (15#)  2 pl / (10#)     1  1     x10  x10     11/5 - modified sets/reps  11/9 - added   TB BUE ER BUE ER @ WALL 11/9 - added   TB BUE Low T's orange 2 10 11/16 - added   W slides on wall   2 5    11/16 - difficult   BUE TB Flexion Wall Slide Orange 2 5 11/16 - added   Pulleys: Flexion & Abduction seated 5'' 10x ea 11/16 - added                 Therapeutic Activities (19255)       Sit to side lying  Side lying to supine  Supine to side lying  Side lying to sit       Reviewed pillow position for laying supine and on her L side  Crate carries   Crate squat to chair  Cabinet reach         Neuromuscular Re-ed (34906)       Thor ext over small bolster in sitting  Scap retraction around small bolster in sitting       pec stretch in door frame     1/2 foam roll along spine with PPT and chin tuck      1/2 foam roll along spine with CT and UE flexion                          Manual Intervention (81831)       DTM to Bilateral UT, LS, pecs, teres minor in sitting    L sidelying over 2 pillows - STM to R intercostals from around T4 to T10 anteriorly and posteriorly; QL stretch, R T/S rotation stretch   X 15 min                                  Modalities:   11/12: MHP x 5 min to L intercostals in L sidelying     10/29: MHP to UTs B in sitting x 10 min     Pt. Education:  11/12: Reassessed goals, discussed progress made and areas remaining for improvement, issued outcome measure and reviewed new score with pt as compared to diana, advised pt to avoid yard work for now since it flared her up so much, also educated pt on massage at intercostals and reminder to stand straight so not compression on R ribs    -patient educated on diagnosis, prognosis and expectations for rehab  -all patient questions were answered    Home Exercise Program:  Access Code: [email or web address]  URL: [email or web address]  Date: 10/22/2021  Prepared by: Simin Cardoso    Exercises  Thor ext with SB on wall  - 1 x daily - 7 x weekly - 3 sets - 10 reps  BS roll up on wall - 1 x daily - 7 x weekly - 3 sets - 10 reps  Seated Thoracic Lumbar Extension - 1 x daily - 7 x weekly - 3 sets - 10 reps  Doorway Pec Stretch at 120 Degrees Abduction - 1 x daily - 7 x weekly - 3 sets - 10 reps  Seated Scapular Retraction - 1 x daily - 7 x weekly - 3 sets - 10 reps  Seated Bilateral Shoulder External Rotation with Resistance - 1 x daily - 7 x weekly - 3 sets - 10 reps    Access Code: [email or web address]  URL: [email or web address]  Date: 10/20/2021  Prepared by: Luis Scales     Exercises  Seated Thoracic Flexion and Rotation with Arms Crossed - 2-3 x daily - 6 x weekly - 1 sets - 8 reps - 3 hold  Standing Scapular Retraction - 2-3 x daily - 6 x weekly - 1 sets - 8 reps - 3 hold  Seated Anterior Pelvic Tilt - 2-3 x daily - 6 x weekly - 1 sets - 8 reps - 3 hold  Seated Sidebending - 2-3 x daily - 6 x weekly - 1 sets - 8 reps - 3 hold    Therapeutic Exercise and NMR EXR  [x] (78940) Provided verbal/tactile cueing for activities related to strengthening, flexibility, endurance, ROM for improvements in  [] LE / Lumbar: LE, proximal hip, and core control with self care, mobility, lifting, ambulation.   [x] UE / Cervical: cervical, postural, scapular, scapulothoracic and UE control with self care, reaching, carrying, lifting, house/yardwork, driving, computer work.  [] (76107) Provided verbal/tactile cueing for activities related to improving balance, coordination, kinesthetic sense, posture, motor skill, proprioception to assist with   [] LE / lumbar: LE, proximal hip, and core control in self care, mobility, lifting, ambulation and eccentric single leg control. [] UE / cervical: cervical, scapular, scapulothoracic and UE control with self care, reaching, carrying, lifting, house/yardwork, driving, computer work.   [] (25926) Therapist is in constant attendance of 2 or more patients providing skilled therapy interventions, but not providing any significant amount of measurable one-on-one time to either patient, for improvements in  [] LE / lumbar: LE, proximal hip, and core control in self care, mobility, lifting, ambulation and eccentric single leg control. [] UE / cervical: cervical, scapular, scapulothoracic and UE control with self care, reaching, carrying, lifting, house/yardwork, driving, computer work.      NMR and Therapeutic Activities:    [] (48677 or 68370) Provided verbal/tactile cueing for activities related to improving balance, coordination, kinesthetic sense, posture, motor skill, proprioception and motor activation to allow for proper function of   [] LE: / Lumbar core, proximal hip and LE with self care and ADLs  [] UE / Cervical: cervical, postural, scapular, scapulothoracic and UE control with self care, carrying, lifting, driving, computer work.   [] (75986) Gait Re-education- Provided training and instruction to the patient for proper LE, core and proximal hip recruitment and positioning and eccentric body weight control with ambulation re-education including up and down stairs     Home Management Training / Self Care:  [] (02184) Provided self-care/home management training related to activities of daily living and compensatory training, and/or use of adaptive equipment for improvement with: ADLs and compensatory training, meal preparation, safety procedures and instruction in use of adaptive equipment, including bathing, grooming, dressing, personal hygiene, basic household cleaning and chores. Home Exercise Program:    [] (52660) Reviewed/Progressed HEP activities related to strengthening, flexibility, endurance, ROM of   [] LE / Lumbar: core, proximal hip and LE for functional self-care, mobility, lifting and ambulation/stair navigation   [] UE / Cervical: cervical, postural, scapular, scapulothoracic and UE control with self care, reaching, carrying, lifting, house/yardwork, driving, computer work  [] (46031)Reviewed/Progressed HEP activities related to improving balance, coordination, kinesthetic sense, posture, motor skill, proprioception of   [] LE: core, proximal hip and LE for self care, mobility, lifting, and ambulation/stair navigation    [] UE / Cervical: cervical, postural,  scapular, scapulothoracic and UE control with self care, reaching, carrying, lifting, house/yardwork, driving, computer work    Manual Treatments:  PROM / STM / Oscillations-Mobs:  G-I, II, III, IV (PA's, Inf., Post.)  [x] (36325) Provided manual therapy to mobilize LE, proximal hip and/or LS spine soft tissue/joints for the purpose of modulating pain, promoting relaxation,  increasing ROM, reducing/eliminating soft tissue swelling/inflammation/restriction, improving soft tissue extensibility and allowing for proper ROM for normal function with   [] LE / lumbar: self care, mobility, lifting and ambulation. [x] UE / Cervical: self care, reaching, carrying, lifting, house/yardwork, driving, computer work.      Modalities:  [] (27870) Vasopneumatic compression: Utilized vasopneumatic compression to decrease edema / swelling for the purpose of improving mobility and quad tone / recruitment which will allow for increased overall function including but not limited to self-care, transfers, ambulation, and ascending / descending stairs. Charges:  Timed Code Treatment Minutes: 45   Total Treatment Minutes: 45     [] EVAL - LOW (60076)   [] EVAL - MOD (97422)  [] EVAL - HIGH (67361)  [] RE-EVAL (22238)  [x] IH(54745) x 2       [] Ionto  [] NMR (26424) x1       [] Vaso  [x] Manual (97686) x  1     [] Ultrasound  [] TA x  1              [] Mech Traction (57091)  [] Aquatic Therapy x      [] ES (un) (87198):   [] Home Management Training x  [] ES(attended) (05843)   [] Dry Needling 1-2 muscles (41335):  [] Dry Needling 3+ muscles (068774)  [] Group:      [] Other:     GOALS:  Patient stated goal: posture training and some strength  [x]? Progressing: []? Met: []? Not Met: []? Adjusted     Therapist goals for Patient:   Short Term Goals: To be achieved in: 2 weeks  1. Independent in HEP and progression per patient tolerance, in order to prevent re-injury. []? Progressing: [x]? Met: []? Not Met: []? Adjusted  2. Patient will have a decrease in pain to <5/10 on average facilitate improvement in movement, function, and ADLs as indicated by Functional Deficits. - about 6/10 this week   [x]? Progressing: []? Met: []? Not Met: []? Adjusted     Long Term Goals: To be achieved in: 4 weeks  1. Disability index score of 44% or less for the NDI and/or DEENA to assist with reaching prior level of function. - still 54% impaired   [x]? Progressing: []? Met: []? Not Met: []? Adjusted  2. Patient will demonstrate increased AROM to FUNCTIONAL (FOR THE PATIENT) and WITHOUT PAIN of thoracic & lumbar spine for proper joint functioning as indicated by patients Functional Deficits. []? Progressing: [x]? Met: []? Not Met: []? Adjusted  3. Patient will demonstrate an increase in postural awareness and control IN STANDING FOR UP TO 60 SECONDS to allow for proper functional mobility as indicated by patients Functional Deficits. []? Progressing: [x]? Met: []? Not Met: []? Adjusted  4.  Patient will demonstrate an increase in Strength to >4/5 proximal hip and core activation >6/10 tvAbd to allow for proper functional mobility as indicated by patients Functional Deficits. - NOT ASSESSED  [x]? Progressing: []? Met: []? Not Met: []? Adjusted  5. Patient will return to functional activities including bending over sink to wash dishes or brush teeth without increased symptoms or restriction. [x]? Progressing: []? Met: []? Not Met: []? Adjusted    Overall Progression Towards Functional goals/ Treatment Progress Update:  [] Patient is progressing as expected towards functional goals listed. [x] Progression is slowed due to complexities/Impairments listed. [] Progression has been slowed due to co-morbidities. [] Plan just implemented, too soon to assess goals progression <30days   [] Goals require adjustment due to lack of progress  [] Patient is not progressing as expected and requires additional follow up with physician  [] Other    Persisting Functional Limitations/Impairments:  [x]Sleeping []Sitting               [x]Standing [x]Transfers        [x]Walking []Kneeling               [x]Stairs [x]Squatting / bending   [x]ADLs [x]Reaching  [x]Lifting  [x]Housework  []Driving []Job related tasks  []Sports/Recreation []Other:      ASSESSMENT: PT notices improved, level shoulders in standing after manual interventions and PT exercises this date. The patient's posture and posterior scapulohumeral strengthening continue to progress steadily with each visit. She continue to push herself functionally at home, especially when motivation is present. Treatment/Activity Tolerance:  [x] Patient able to complete tx  [] Patient limited by fatigue  [] Patient limited by pain  [] Patient limited by other medical complications  [] Other:     Prognosis: [x] Good [x] Fair  [] Poor    Patient Requires Follow-up: [x] Yes  [] No    Plan for next treatment session: assess repsonse to HEP.  Begin with simple stretches and mobility for T/S, BUEs, L/S, BLEs. CONTINUE GENTLE ACTIVATION OF POSTERIOR RTC & POSTERIOR SCAPULAR & THORACIC  MUSCULATURE   ASSESS STRENGTH OF BUEs, MT, RHOMBOIDS. ASSESS FLEXIBILITY OF HAMSTRING AND LATS. PLAN: See eval. PT 2x / week for 4 weeks. [x] Continue per plan of care [] Alter current plan (see comments)  [] Plan of care initiated [] Hold pending MD visit [] Discharge    Electronically signed by: Georgi Pickett, PT, DPT    Note: If patient does not return for scheduled/ recommended follow up visits, this note will serve as a discharge from care along with most recent update on progress.

## 2021-11-22 ENCOUNTER — OFFICE VISIT (OUTPATIENT)
Dept: INTERNAL MEDICINE CLINIC | Age: 71
End: 2021-11-22
Payer: MEDICARE

## 2021-11-22 VITALS
SYSTOLIC BLOOD PRESSURE: 130 MMHG | WEIGHT: 118 LBS | HEART RATE: 80 BPM | HEIGHT: 68 IN | DIASTOLIC BLOOD PRESSURE: 84 MMHG | BODY MASS INDEX: 17.88 KG/M2

## 2021-11-22 DIAGNOSIS — Z23 NEED FOR INFLUENZA VACCINATION: ICD-10-CM

## 2021-11-22 DIAGNOSIS — Z00.00 ROUTINE GENERAL MEDICAL EXAMINATION AT A HEALTH CARE FACILITY: Primary | ICD-10-CM

## 2021-11-22 DIAGNOSIS — E78.00 PURE HYPERCHOLESTEROLEMIA: ICD-10-CM

## 2021-11-22 LAB
ALBUMIN SERPL-MCNC: 4.4 G/DL (ref 3.4–5)
ANION GAP SERPL CALCULATED.3IONS-SCNC: 12 MMOL/L (ref 3–16)
BUN BLDV-MCNC: 10 MG/DL (ref 7–20)
CALCIUM SERPL-MCNC: 9.7 MG/DL (ref 8.3–10.6)
CHLORIDE BLD-SCNC: 100 MMOL/L (ref 99–110)
CHOLESTEROL, TOTAL: 151 MG/DL (ref 0–199)
CO2: 28 MMOL/L (ref 21–32)
CREAT SERPL-MCNC: <0.5 MG/DL (ref 0.6–1.2)
GFR AFRICAN AMERICAN: >60
GFR NON-AFRICAN AMERICAN: >60
GLUCOSE BLD-MCNC: 102 MG/DL (ref 70–99)
HDLC SERPL-MCNC: 54 MG/DL (ref 40–60)
LDL CHOLESTEROL CALCULATED: 73 MG/DL
PHOSPHORUS: 4.2 MG/DL (ref 2.5–4.9)
POTASSIUM SERPL-SCNC: 3.8 MMOL/L (ref 3.5–5.1)
SODIUM BLD-SCNC: 140 MMOL/L (ref 136–145)
TRIGL SERPL-MCNC: 119 MG/DL (ref 0–150)
VLDLC SERPL CALC-MCNC: 24 MG/DL

## 2021-11-22 PROCEDURE — G0008 ADMIN INFLUENZA VIRUS VAC: HCPCS | Performed by: INTERNAL MEDICINE

## 2021-11-22 PROCEDURE — 3017F COLORECTAL CA SCREEN DOC REV: CPT | Performed by: INTERNAL MEDICINE

## 2021-11-22 PROCEDURE — 90694 VACC AIIV4 NO PRSRV 0.5ML IM: CPT | Performed by: INTERNAL MEDICINE

## 2021-11-22 PROCEDURE — G8484 FLU IMMUNIZE NO ADMIN: HCPCS | Performed by: INTERNAL MEDICINE

## 2021-11-22 PROCEDURE — G0402 INITIAL PREVENTIVE EXAM: HCPCS | Performed by: INTERNAL MEDICINE

## 2021-11-22 PROCEDURE — 4040F PNEUMOC VAC/ADMIN/RCVD: CPT | Performed by: INTERNAL MEDICINE

## 2021-11-22 PROCEDURE — 1123F ACP DISCUSS/DSCN MKR DOCD: CPT | Performed by: INTERNAL MEDICINE

## 2021-11-22 RX ORDER — CITALOPRAM 20 MG/1
30 TABLET ORAL DAILY
Qty: 135 TABLET | Refills: 1 | Status: SHIPPED
Start: 2021-11-22 | End: 2022-05-23 | Stop reason: ALTCHOICE

## 2021-11-22 ASSESSMENT — PATIENT HEALTH QUESTIONNAIRE - PHQ9
SUM OF ALL RESPONSES TO PHQ QUESTIONS 1-9: 10
SUM OF ALL RESPONSES TO PHQ QUESTIONS 1-9: 10
2. FEELING DOWN, DEPRESSED OR HOPELESS: 2
7. TROUBLE CONCENTRATING ON THINGS, SUCH AS READING THE NEWSPAPER OR WATCHING TELEVISION: 2
4. FEELING TIRED OR HAVING LITTLE ENERGY: 1
5. POOR APPETITE OR OVEREATING: 3
3. TROUBLE FALLING OR STAYING ASLEEP: 1
SUM OF ALL RESPONSES TO PHQ QUESTIONS 1-9: 10
8. MOVING OR SPEAKING SO SLOWLY THAT OTHER PEOPLE COULD HAVE NOTICED. OR THE OPPOSITE, BEING SO FIGETY OR RESTLESS THAT YOU HAVE BEEN MOVING AROUND A LOT MORE THAN USUAL: 0
SUM OF ALL RESPONSES TO PHQ9 QUESTIONS 1 & 2: 3
10. IF YOU CHECKED OFF ANY PROBLEMS, HOW DIFFICULT HAVE THESE PROBLEMS MADE IT FOR YOU TO DO YOUR WORK, TAKE CARE OF THINGS AT HOME, OR GET ALONG WITH OTHER PEOPLE: 1
9. THOUGHTS THAT YOU WOULD BE BETTER OFF DEAD, OR OF HURTING YOURSELF: 0
1. LITTLE INTEREST OR PLEASURE IN DOING THINGS: 1
6. FEELING BAD ABOUT YOURSELF - OR THAT YOU ARE A FAILURE OR HAVE LET YOURSELF OR YOUR FAMILY DOWN: 0

## 2021-11-22 ASSESSMENT — LIFESTYLE VARIABLES: HOW OFTEN DO YOU HAVE A DRINK CONTAINING ALCOHOL: 0

## 2021-11-22 NOTE — PATIENT INSTRUCTIONS
Personalized Preventive Plan for Neha Pradhan - 11/22/2021  Medicare offers a range of preventive health benefits. Some of the tests and screenings are paid in full while other may be subject to a deductible, co-insurance, and/or copay. Some of these benefits include a comprehensive review of your medical history including lifestyle, illnesses that may run in your family, and various assessments and screenings as appropriate. After reviewing your medical record and screening and assessments performed today your provider may have ordered immunizations, labs, imaging, and/or referrals for you. A list of these orders (if applicable) as well as your Preventive Care list are included within your After Visit Summary for your review. Other Preventive Recommendations:    · A preventive eye exam performed by an eye specialist is recommended every 1-2 years to screen for glaucoma; cataracts, macular degeneration, and other eye disorders. · A preventive dental visit is recommended every 6 months. · Try to get at least 150 minutes of exercise per week or 10,000 steps per day on a pedometer . · Order or download the FREE \"Exercise & Physical Activity: Your Everyday Guide\" from [employer]. Call 9-732.852.1207 or search [employer] online. · You need 3138-6808 mg of calcium and 7766-5043 IU of vitamin D per day. It is possible to meet your calcium requirement with diet alone, but a vitamin D supplement is usually necessary to meet this goal.  · When exposed to the sun, use a sunscreen that protects against both UVA and UVB radiation with an SPF of 30 or greater. Reapply every 2 to 3 hours or after sweating, drying off with a towel, or swimming. · Always wear a seat belt when traveling in a car. Always wear a helmet when riding a bicycle or motorcycle.

## 2021-11-22 NOTE — PROGRESS NOTES
Medicare Annual Wellness Visit  Name: Neha Sun LXRSSE Date: 2021   MRN: [id number] Sex: Female   Age: 66 y.o. Ethnicity: Non- / Non    : 1950 Race: White (non-)      Neha Sun is here for Medicare AWV    Screenings for behavioral, psychosocial and functional/safety risks, and cognitive dysfunction are all negative except as indicated below. These results, as well as other patient data from the [address] form, are documented in Flowsheets linked to this Encounter. Allergies   Allergen Reactions    Nsaids      Causes itching and swelling       Prior to Visit Medications    Medication Sig Taking? Authorizing Provider   citalopram (CELEXA) 20 MG tablet Take 1.5 tablets by mouth daily Yes Yoshi Barlow MD   oxyCODONE (ROXICODONE) 20 MG immediate release tablet Take 20 mg by mouth every 12 hours. Yes Historical Provider, MD   propranolol (INDERAL) 20 MG tablet TAKE ONE TABLET BY MOUTH THREE TIMES A DAY Yes Yoshi Barlow MD   oxyCODONE-acetaminophen (PERCOCET) 7.5-325 MG per tablet Take 1 tablet by mouth every 6 hours as needed for Pain. Yes Historical Provider, MD   polyethylene glycol (GLYCOLAX) 17 g packet Take 17 g by mouth daily as needed for Constipation Yes Historical Provider, MD   gabapentin (NEURONTIN) 300 MG capsule Take 300 mg by mouth 3 times daily. Yes Historical Provider, MD   atorvastatin (LIPITOR) 20 MG tablet TAKE ONE TABLET BY MOUTH DAILY Yes Yoshi Barlow MD   calcium carbonate (OSCAL) 500 MG TABS tablet Take 500 mg by mouth daily Yes Historical Provider, MD   Multiple Vitamins-Minerals (THERAPEUTIC MULTIVITAMIN-MINERALS) tablet Take 1 tablet by mouth daily Yes Historical Provider, MD   vitamin C (ASCORBIC ACID) 500 MG tablet Take 1,000 mg by mouth daily Yes Historical Provider, MD   Phenylephrine-Aspirin (EVITA-SELTZER PLUS SINUS PO) Take 2 tablets by mouth as needed.  Yes Historical Provider, MD       Past Medical 11/22/21 0849 11/22/21 0907   BP: (!) 142/80 (!) 140/80   Pulse: 80    Weight: 118 lb (53.5 kg)    Height: 5' 8\" (1.727 m)      Body mass index is 17.94 kg/m². Based upon direct observation of the patient, evaluation of cognition reveals recent and remote memory intact. GEN: WN/WD  CV: RRR, 2/6 systolic murmur  RESP: CTAB    Patient's complete Health Risk Assessment and screening values have been reviewed and are found in Flowsheets. The following problems were reviewed today and where indicated follow up appointments were made and/or referrals ordered. Positive Risk Factor Screenings with Interventions:       Depression:  PHQ-2 Score: 3  PHQ-9 Total Score: 10    Severity:1-4 = minimal depression, 5-9 = mild depression, 10-14 = moderate depression, 15-19 = moderately severe depression, 20-27 = severe depression  Depression Interventions:  · continue citalpram. Declines referral to [facility]   · Pain is key contributor      General Health and ACP:  General  In general, how would you say your health is?: Fair  In the past 7 days, have you experienced any of the following? New or Increased Pain, New or Increased Fatigue, Loneliness, Social Isolation, Stress or Anger?: (!) New or Increased Pain, New or Increased Fatigue  Do you get the social and emotional support that you need?: Yes  Do you have a Living Will?: Yes  Advance Directives     Power of  Living Will ACP-Advance Directive ACP-Power of     Not on File Not on File Not on File Not on File      General Health Risk Interventions:  · seeing pain managment and doing PT to improve pain.    · HCDM confirmed  · Copy of Living Will requested    Health Habits/Nutrition:  Health Habits/Nutrition  Do you exercise for at least 20 minutes 2-3 times per week?: Yes  Have you lost any weight without trying in the past 3 months?: (!) Yes  Do you eat only one meal per day?: No  Have you seen the dentist within the past year?: Yes  Body mass index: (!) 17.94  Health Habits/Nutrition Interventions:  · weight loss due to chronic pain. she is supplementing with Ensure    Hearing/Vision:  No exam data present  Hearing/Vision  Do you or your family notice any trouble with your hearing that hasn't been managed with hearing aids?: No  Do you have difficulty driving, watching TV, or doing any of your daily activities because of your eyesight?: No  Have you had an eye exam within the past year?: (!) No (due for that)  Hearing/Vision Interventions:  · eye exam recommended      Personalized Preventive Plan   Current Health Maintenance Status  Immunization History   Administered Date(s) Administered    COVID-19, Dalton Page PF, 30mcg/0.3mL 02/05/2021, 03/05/2021, 11/16/2021    Influenza, High Dose (Fluzone 65 yrs and older) 09/30/2016, 11/10/2017, 11/20/2018, 10/13/2020    Influenza, Quadv, adjuvanted, 65 yrs +, IM, PF (Fluad) 10/13/2020, 11/22/2021    Influenza, Triv, inactivated, subunit, adjuvanted, IM (Fluad 65 yrs and older) 10/31/2019    Pneumococcal Conjugate 13-valent ([id number]) 09/30/2016    Pneumococcal Polysaccharide ([id number]) 11/10/2017    Td, unspecified formulation 09/30/2016    Zoster Live (Zostavax) 02/02/2013    Zoster Recombinant (Shingrix) 11/17/2020, 05/27/2021        Health Maintenance   Topic Date Due    Annual Wellness Visit (AWV)  Never done    DTaP/Tdap/Td vaccine (1 - Tdap) 01/01/2099 (Originally 10/1/2016)    Potassium monitoring  12/14/2021    COVID-19 Vaccine (4 - Booster for Pfizer series) 05/16/2022    Creatinine monitoring  09/29/2022    Flu vaccine  Completed    Shingles Vaccine  Completed    Pneumococcal 65+ yrs at Risk Vaccine  Completed    Hepatitis A vaccine  Aged Out    Hepatitis B vaccine  Aged Out    Hib vaccine  Aged Out    Meningococcal (ACWY) vaccine  Aged Out     Recommendations for [employer] Due: see orders and patient instructions/AVS.  .   Recommended screening schedule for the next 5-10 years is provided to the patient in written form: see Patient Instructions/AVS.    Neha was seen today for medicare awv. Diagnoses and all orders for this visit:    Routine general medical examination at a health care facility    Need for influenza vaccination  -     INFLUENZA, QUADV, ADJUVANTED, 65 YRS =, IM, PF, PREFILL SYR, 0.5ML (FLUAD)    Pure hypercholesterolemia  -     Renal Function Panel  -     Lipid Panel    Other orders  -     citalopram (CELEXA) 20 MG tablet;  Take 1.5 tablets by mouth daily

## 2021-11-23 ENCOUNTER — HOSPITAL ENCOUNTER (OUTPATIENT)
Dept: PHYSICAL THERAPY | Age: 71
Setting detail: THERAPIES SERIES
Discharge: HOME | End: 2021-11-23
Payer: MEDICARE

## 2021-11-23 PROCEDURE — 97110 THERAPEUTIC EXERCISES: CPT

## 2021-11-23 PROCEDURE — 97112 NEUROMUSCULAR REEDUCATION: CPT

## 2021-11-23 NOTE — FLOWSHEET NOTE
[facility]  Outpatient Physical Therapy  Phone: [phone]   Fax: [phone]    Physical Therapy Daily Treatment Note  Date:  2021    Patient Name:  Earnest Horowitz    :  1950  MRN: [id number]  Medical/Treatment Diagnosis Information:  · Diagnosis: M51.34 (ICD-10-CM) - Other intervertebral disc degeneration, thoracic region  · Treatment Diagnosis: midback pain, low back pain, core weakness, decreased activity tolerance, impaired posture  Insurance/Certification information:  PT Insurance Information: medicare & [facility] aarp  Physician Information:  Referring Practitioner: Andrea Bonilla MD  Plan of care signed (Y/N): [x]  Yes []  No     Date of Patient follow up with Physician:      Progress Report: []  Yes  [x]  No      Date Range for reporting period:  Beginning: 10/20/21  POC: 21  Ending: TBD    Progress report due (10 Rx/or 30 days whichever is less): visit #16 or     Recertification due (POC duration/ or 90 days whichever is less): visit #16 or 22    Visit # Insurance Allowable Auth required? Date Range    +  PMN []  Yes  [x]  No      Latex Allergy:  [x]NO      []YES  Preferred Language for Healthcare:   [x]English       []other:    Functional Scale:        Date assessed:  DEENA: raw score = 27/50; dysfunction = 54%   10/20/21  DEENA: raw score = 27/50; dysfunction = 54%   21    Pain level:  6/10     SUBJECTIVE: The patient reports after running errands her pain has increased today. She laid down for about 20 minutes with aspercream in place on R ribcage (anterior/posterior). She reports her back feels tired after activity.     OBJECTIVE:    - improved multifidii/lumbar extensor activation     :   AROM B GHJ-  Able to reach behind head  Able to reach behind back  R GHJ flexion 180 deg  L GHJ flexion 100 deg  R GHJ abd 180 deg   L GHJ abd 90 deg      - improving tolerance to stretch; +1 TTP R rib 8, 9 intercostals  Patient ambulates with forward head and rounded shoulders. Kyphosis at T-spine.      RESTRICTIONS/PRECAUTIONS: R lung tumor    Exercises/Interventions:     Therapeutic Exercises (23225) Resistance / level Sets/sec Reps Notes   UBE Level 1  RPM 90  2 min forward, 2 min retro  VCs for relax L scap   Recumbent Bike    SEATED T/S ROTATION R/L      STANDING SCAP RETRACTION      Standing Spinal Sidebend  1 5x R/L     SEATED ANTERIOR PELVIC TILT      Standing PVC T/L:  Rotation R/L  Flexion/Extension  Flexion to  90 deg  BUE Extension   11/9 - added  11/12: PVC behind back   SB flexion roll ups with PPT  SB rainbows on wall   Thor ext over SB on wall     Sidelying R Rib/T/L stretch w/ RUE overhead, also w/ open book stretch 11/9 - added   Cables:  Mid row (overhand/underhand)  LPD  High Row   11/5 - modified sets/reps  11/9 - added   TB BUE ER BUE ER @ WALL 11/9 - added   TB BUE Low T's W slides on wall  BUE TB Flexion Wall Slide Pulleys: Flexion & Abduction Forward Bent Windmills   5x R/L 11/23 - added   Standing Segmental Flexion/Extension   3x 11/23 - added   Standing SB Rollout (BUE)  5'' 8 11/23          Therapeutic Activities (83696)       Sit to side lying  Side lying to supine  Supine to side lying  Side lying to sit       Reviewed pillow position for laying supine and on her L side  Crate carries   Crate squat to chair  Cabinet reach         Neuromuscular Re-ed (17453)       Thor ext over small bolster in sitting  Scap retraction around small bolster in sitting       pec stretch in door frame     1/2 foam roll along spine with PPT and chin tuck      1/2 foam roll along spine with CT and UE flexion     Neutral Hip Hinging (at table)  1 15 11/23 - added   Standing Alt SLR Abd Nancy loop 2 10 11/23 - added   Standing SLR Ext orange 2 10 11/23 - added   Standing Birddogs (leaning on SB against table)  1 10 11/23 - added          Manual Intervention (40981)       DTM to Bilateral UT, LS, pecs, teres minor in sitting    L sidelying over 2 pillows - STM to R intercostals from around T4 to T10 anteriorly and posteriorly; QL stretch, R T/S rotation stretch   X 15 min    Seated STM BUT, B LS, R intercostals, QL   6 min                           Modalities:   11/12: MHP x 5 min to L intercostals in L sidelying     10/29: MHP to UTs B in sitting x 10 min     Pt. Education:  11/12: Reassessed goals, discussed progress made and areas remaining for improvement, issued outcome measure and reviewed new score with pt as compared to eval, advised pt to avoid yard work for now since it flared her up so much, also educated pt on massage at intercostals and reminder to stand straight so not compression on R ribs    -patient educated on diagnosis, prognosis and expectations for rehab  -all patient questions were answered    Home Exercise Program:  Access Code: [email or web address]  URL: [employer]/  Date: 10/22/2021  Prepared by: Isabella Clark    Exercises  Thor ext with SB on wall  - 1 x daily - 7 x weekly - 3 sets - 10 reps  BS roll up on wall - 1 x daily - 7 x weekly - 3 sets - 10 reps  Seated Thoracic Lumbar Extension - 1 x daily - 7 x weekly - 3 sets - 10 reps  Doorway Pec Stretch at 120 Degrees Abduction - 1 x daily - 7 x weekly - 3 sets - 10 reps  Seated Scapular Retraction - 1 x daily - 7 x weekly - 3 sets - 10 reps  Seated Bilateral Shoulder External Rotation with Resistance - 1 x daily - 7 x weekly - 3 sets - 10 reps    Access Code: [email or web address]  URL: [employer]/  Date: 10/20/2021  Prepared by: Aleksandr Quarchristopher     Exercises  Seated Thoracic Flexion and Rotation with Arms Crossed - 2-3 x daily - 6 x weekly - 1 sets - 8 reps - 3 hold  Standing Scapular Retraction - 2-3 x daily - 6 x weekly - 1 sets - 8 reps - 3 hold  Seated Anterior Pelvic Tilt - 2-3 x daily - 6 x weekly - 1 sets - 8 reps - 3 hold  Seated Sidebending - 2-3 x daily - 6 x weekly - 1 sets - 8 reps - 3 hold    Therapeutic Exercise and NMR EXR  [x] (44561) Provided verbal/tactile cueing for activities related to strengthening, flexibility, endurance, ROM for improvements in  [] LE / Lumbar: LE, proximal hip, and core control with self care, mobility, lifting, ambulation. [x] UE / Cervical: cervical, postural, scapular, scapulothoracic and UE control with self care, reaching, carrying, lifting, house/yardwork, driving, computer work. [x] (61186) Provided verbal/tactile cueing for activities related to improving balance, coordination, kinesthetic sense, posture, motor skill, proprioception to assist with   [] LE / lumbar: LE, proximal hip, and core control in self care, mobility, lifting, ambulation and eccentric single leg control. [x] UE / cervical: cervical, scapular, scapulothoracic and UE control with self care, reaching, carrying, lifting, house/yardwork, driving, computer work.   [] (86566) Therapist is in constant attendance of 2 or more patients providing skilled therapy interventions, but not providing any significant amount of measurable one-on-one time to either patient, for improvements in  [] LE / lumbar: LE, proximal hip, and core control in self care, mobility, lifting, ambulation and eccentric single leg control. [] UE / cervical: cervical, scapular, scapulothoracic and UE control with self care, reaching, carrying, lifting, house/yardwork, driving, computer work.      NMR and Therapeutic Activities:    [x] (77601 or 10411) Provided verbal/tactile cueing for activities related to improving balance, coordination, kinesthetic sense, posture, motor skill, proprioception and motor activation to allow for proper function of   [] LE: / Lumbar core, proximal hip and LE with self care and ADLs  [x] UE / Cervical: cervical, postural, scapular, scapulothoracic and UE control with self care, carrying, lifting, driving, computer work.   [] (15041) Gait Re-education- Provided training and instruction to the patient for proper LE, core and proximal hip recruitment and Patient will demonstrate an increase in postural awareness and control IN STANDING FOR UP TO 60 SECONDS to allow for proper functional mobility as indicated by patients Functional Deficits. []? Progressing: [x]? Met: []? Not Met: []? Adjusted  4. Patient will demonstrate an increase in Strength to >4/5 proximal hip and core activation >6/10 tvAbd to allow for proper functional mobility as indicated by patients Functional Deficits. - NOT ASSESSED  [x]? Progressing: []? Met: []? Not Met: []? Adjusted  5. Patient will return to functional activities including bending over sink to wash dishes or brush teeth without increased symptoms or restriction. [x]? Progressing: []? Met: []? Not Met: []? Adjusted    Overall Progression Towards Functional goals/ Treatment Progress Update:  [] Patient is progressing as expected towards functional goals listed. [x] Progression is slowed due to complexities/Impairments listed. [] Progression has been slowed due to co-morbidities. [] Plan just implemented, too soon to assess goals progression <30days   [] Goals require adjustment due to lack of progress  [] Patient is not progressing as expected and requires additional follow up with physician  [] Other    Persisting Functional Limitations/Impairments:  [x]Sleeping []Sitting               [x]Standing [x]Transfers        [x]Walking []Kneeling               [x]Stairs [x]Squatting / bending   [x]ADLs [x]Reaching  [x]Lifting  [x]Housework  []Driving []Job related tasks  []Sports/Recreation []Other:      ASSESSMENT: Emphasis on gluteal, back extensor strengthening this date and segmental mobility in standing. The patient tolerated all exercises well, with only slight fatigue in musculature. The patient appears to be moving more freely and tolerating more prolonged activities in community thanks to therapy and continued compliance with medication.     Treatment/Activity Tolerance:  [x] Patient able to complete tx  [] Patient limited by fatigue  [] Patient limited by pain  [] Patient limited by other medical complications  [] Other:     Prognosis: [x] Good [x] Fair  [] Poor    Patient Requires Follow-up: [x] Yes  [] No    Plan for next treatment session: assess repsonse to HEP. Begin with simple stretches and mobility for T/S, BUEs, L/S, BLEs. CONTINUE GENTLE ACTIVATION OF POSTERIOR RTC & POSTERIOR SCAPULAR & THORACIC  MUSCULATURE   ASSESS STRENGTH OF BUEs, MT, RHOMBOIDS. ASSESS FLEXIBILITY OF HAMSTRING AND LATS. PLAN: See eval. PT 2x / week for 4 weeks. [x] Continue per plan of care [] Alter current plan (see comments)  [] Plan of care initiated [] Hold pending MD visit [] Discharge    Electronically signed by: Hesham Spring PT, DPT    Note: If patient does not return for scheduled/ recommended follow up visits, this note will serve as a discharge from care along with most recent update on progress.

## 2021-11-29 ENCOUNTER — HOSPITAL ENCOUNTER (OUTPATIENT)
Dept: PHYSICAL THERAPY | Age: 71
Setting detail: THERAPIES SERIES
Discharge: HOME | End: 2021-11-29
Payer: MEDICARE

## 2021-11-29 PROCEDURE — 97530 THERAPEUTIC ACTIVITIES: CPT

## 2021-11-29 PROCEDURE — 97110 THERAPEUTIC EXERCISES: CPT

## 2021-11-29 NOTE — FLOWSHEET NOTE
[facility] Physical Therapy  Phone: [phone]   Fax: [phone]    Physical Therapy Daily Treatment Note  Date:  2021    Patient Name:  Javier Alonso    :  1950  MRN: [id number]  Medical/Treatment Diagnosis Information:  · Diagnosis: M51.34 (ICD-10-CM) - Other intervertebral disc degeneration, thoracic region  · Treatment Diagnosis: midback pain, low back pain, core weakness, decreased activity tolerance, impaired posture  Insurance/Certification information:  PT Insurance Information: medicare & [facility] aarp  Physician Information:  Referring Practitioner: Daniel Palmer MD  Plan of care signed (Y/N): [x]  Yes []  No     Date of Patient follow up with Physician:      Progress Report: []  Yes  [x]  No      Date Range for reporting period:  Beginning: 10/20/21  POC: 21  Ending: TBD    Progress report due (10 Rx/or 30 days whichever is less): visit #16 or     Recertification due (POC duration/ or 90 days whichever is less): visit #16 or 22    Visit # Insurance Allowable Auth required? Date Range    +  PMN []  Yes  [x]  No      Latex Allergy:  [x]NO      []YES  Preferred Language for Healthcare:   [x]English       []other:    Functional Scale:        Date assessed:  DEENA: raw score = 27/50; dysfunction = 54%   10/20/21   DEENA: raw score = 27/50; dysfunction = 54%   21    Pain level:  6/10     SUBJECTIVE: The patient got up and vacuumed today, so she feels pinchy and sore in the ribs. She used bengay about an hour ago. \"I am fully aware it is there. It didn't subside completely after using bengay. \"    OBJECTIVE:     - improved multifidii/lumbar extensor activation     :   AROM B GHJ-  Able to reach behind head  Able to reach behind back  R GHJ flexion 180 deg  L GHJ flexion 100 deg  R GHJ abd 180 deg   L GHJ abd 90 deg      - improving tolerance to stretch; +1 TTP R rib 8, 9 intercostals  Patient ambulates with forward head and rounded shoulders. Kyphosis at T-spine.      RESTRICTIONS/PRECAUTIONS: R lung tumor    Exercises/Interventions:     Therapeutic Exercises (45312) Resistance / level Sets/sec Reps Notes   UBE Recumbent Bike    SEATED T/S ROTATION R/L      STANDING SCAP RETRACTION      Standing Spinal Sidebend     SEATED ANTERIOR PELVIC TILT      Standing PVC T/L:  Rotation R/L  Flexion/Extension  Flexion to  90 deg  BUE Extension   11/9 - added  11/12: PVC behind back   SB flexion roll ups with PPT  SB rainbows on wall   Thor ext over SB on wall     Cables:  Mid row (overhand/underhand)  LPD  High Row   11/5 - modified sets/reps  11/9 - added   TB BUE ER orange 2 10 BUE ER @ WALL 11/9 - added   TB BUE Low T's BUE TB Flexion Wall Slide Pulleys: Flexion & Abduction Forward Bent Windmills Standing Segmental Flexion/Extension Standing SB Rollout (BUE) TB 3 way (Low T's, Diagonal 1/2) orange 1 10 11/29 - modified so L arm does not have to elevate   Leg Press (B1, L4) 35# 2 10 11/29 - added   Quantum Knee Ext (B3, KD, A3) 10# 2 10 11/29 - added   Quantum HS Curls (B4, K2, A3) 20# 2 10 11/29 - added   Left Sidelying Open Book  5'' 6 11/29 - added          Therapeutic Activities (37309)       Sit to side lying  Side lying to supine  Supine to side lying  Side lying to sit       Reviewed pillow position for laying supine and on her L side  Crate carries   Crate squat to chair  Cabinet reach  CC Row + Alt March 25# 3 5 11/29 - added   [employer] Walkouts: fwd/retro single 1 5x 11/29 - added          Neuromuscular Re-ed (16141)       Thor ext over small bolster in sitting  Scap retraction around small bolster in sitting       pec stretch in door frame     1/2 foam roll along spine with PPT and chin tuck      1/2 foam roll along spine with CT and UE flexion     Neutral Hip Hinging (at table)  Standing SLR Abd Lime 2 10 R/L 11/29 - ^resistance; TE   Standing SLR Ext Standing Birddogs (leaning on SB against table)        Manual Intervention ([phone])       DTM to Bilateral UT, LS, pecs, teres minor in sitting    L sidelying over 2 pillows - STM to R intercostals from around T4 to T10 anteriorly and posteriorly; QL stretch, R T/S rotation stretch   Seated STM BUT, B LS, R intercostals, QL                          Modalities:   11/12: MHP x 5 min to L intercostals in L sidelying   10/29: MHP to UTs B in sitting x 10 min    Pt. Education:  11/12: Reassessed goals, discussed progress made and areas remaining for improvement, issued outcome measure and reviewed new score with pt as compared to eval, advised pt to avoid yard work for now since it flared her up so much, also educated pt on massage at intercostals and reminder to stand straight so not compression on R ribs    -patient educated on diagnosis, prognosis and expectations for rehab  -all patient questions were answered    Home Exercise Program:  Access Code: [email or web address]  URL: [employer]  Date: 11/29/2021  Prepared by: Trish Fry    Exercises  Sidelying Thoracic Rotation with Open Book - 1-2 x daily - 6 x weekly - 1 sets - 6-8 reps - 5 hold    Access Code: [email or web address]  URL: [email or web address]  Date: 10/22/2021  Prepared by: Elizabeth Pate    Exercises  Thor ext with SB on wall  - 1 x daily - 7 x weekly - 3 sets - 10 reps  BS roll up on wall - 1 x daily - 7 x weekly - 3 sets - 10 reps  Seated Thoracic Lumbar Extension - 1 x daily - 7 x weekly - 3 sets - 10 reps  Doorway Pec Stretch at 120 Degrees Abduction - 1 x daily - 7 x weekly - 3 sets - 10 reps  Seated Scapular Retraction - 1 x daily - 7 x weekly - 3 sets - 10 reps  Seated Bilateral Shoulder External Rotation with Resistance - 1 x daily - 7 x weekly - 3 sets - 10 reps    Access Code: [email or web address]  URL: [employer]  Date: 10/20/2021  Prepared by: Trish Fry     Exercises  Seated Thoracic Flexion and Rotation with Arms Crossed - 2-3 x daily - 6 x weekly - 1 sets - 8 reps - 3 hold  Standing Scapular Retraction - 2-3 x daily - 6 x weekly - 1 sets - 8 reps - 3 hold  Seated Anterior Pelvic Tilt - 2-3 x daily - 6 x weekly - 1 sets - 8 reps - 3 hold  Seated Sidebending - 2-3 x daily - 6 x weekly - 1 sets - 8 reps - 3 hold    Therapeutic Exercise and NMR EXR  [x] (34689) Provided verbal/tactile cueing for activities related to strengthening, flexibility, endurance, ROM for improvements in  [] LE / Lumbar: LE, proximal hip, and core control with self care, mobility, lifting, ambulation. [x] UE / Cervical: cervical, postural, scapular, scapulothoracic and UE control with self care, reaching, carrying, lifting, house/yardwork, driving, computer work.  [] (71431) Provided verbal/tactile cueing for activities related to improving balance, coordination, kinesthetic sense, posture, motor skill, proprioception to assist with   [] LE / lumbar: LE, proximal hip, and core control in self care, mobility, lifting, ambulation and eccentric single leg control. [] UE / cervical: cervical, scapular, scapulothoracic and UE control with self care, reaching, carrying, lifting, house/yardwork, driving, computer work.   [] (69372) Therapist is in constant attendance of 2 or more patients providing skilled therapy interventions, but not providing any significant amount of measurable one-on-one time to either patient, for improvements in  [] LE / lumbar: LE, proximal hip, and core control in self care, mobility, lifting, ambulation and eccentric single leg control. [] UE / cervical: cervical, scapular, scapulothoracic and UE control with self care, reaching, carrying, lifting, house/yardwork, driving, computer work.      NMR and Therapeutic Activities:    [x] (29226 or 92211) Provided verbal/tactile cueing for activities related to improving balance, coordination, kinesthetic sense, posture, motor skill, proprioception and motor activation to allow for proper function of   [] LE: / Lumbar core, proximal hip and LE with self care and ADLs  [x] UE / swelling/inflammation/restriction, improving soft tissue extensibility and allowing for proper ROM for normal function with   [] LE / lumbar: self care, mobility, lifting and ambulation. [] UE / Cervical: self care, reaching, carrying, lifting, house/yardwork, driving, computer work. Modalities:  [] (03753) Vasopneumatic compression: Utilized vasopneumatic compression to decrease edema / swelling for the purpose of improving mobility and quad tone / recruitment which will allow for increased overall function including but not limited to self-care, transfers, ambulation, and ascending / descending stairs. Charges:  Timed Code Treatment Minutes: 45   Total Treatment Minutes: 45     [] EVAL - LOW (96374)   [] EVAL - MOD (59732)  [] EVAL - HIGH (77608)  [] RE-EVAL (39479)  [x] JN(78486) x 2       [] Ionto  [] NMR (44215) x       [] Vaso  [] Manual (37162) x       [] Ultrasound  [x] TA x  1              [] Mech Traction (40040)  [] Aquatic Therapy x      [] ES (un) (93852):   [] Home Management Training x  [] ES(attended) (31000)   [] Dry Needling 1-2 muscles (10795):  [] Dry Needling 3+ muscles (403590)  [] Group:      [] Other:     GOALS:  Patient stated goal: posture training and some strength  [x]? Progressing: []? Met: []? Not Met: []? Adjusted     Therapist goals for Patient:   Short Term Goals: To be achieved in: 2 weeks  1. Independent in HEP and progression per patient tolerance, in order to prevent re-injury. []? Progressing: [x]? Met: []? Not Met: []? Adjusted  2. Patient will have a decrease in pain to <5/10 on average facilitate improvement in movement, function, and ADLs as indicated by Functional Deficits. - about 6/10 this week   [x]? Progressing: []? Met: []? Not Met: []? Adjusted     Long Term Goals: To be achieved in: 4 weeks  1. Disability index score of 44% or less for the NDI and/or DEENA to assist with reaching prior level of function. - still 54% impaired   [x]? Progressing: []?  Met: []? Not Met: []? Adjusted  2. Patient will demonstrate increased AROM to FUNCTIONAL (FOR THE PATIENT) and WITHOUT PAIN of thoracic & lumbar spine for proper joint functioning as indicated by patients Functional Deficits. []? Progressing: [x]? Met: []? Not Met: []? Adjusted  3. Patient will demonstrate an increase in postural awareness and control IN STANDING FOR UP TO 60 SECONDS to allow for proper functional mobility as indicated by patients Functional Deficits. []? Progressing: [x]? Met: []? Not Met: []? Adjusted  4. Patient will demonstrate an increase in Strength to >4/5 proximal hip and core activation >6/10 tvAbd to allow for proper functional mobility as indicated by patients Functional Deficits. - NOT ASSESSED  [x]? Progressing: []? Met: []? Not Met: []? Adjusted  5. Patient will return to functional activities including bending over sink to wash dishes or brush teeth without increased symptoms or restriction. [x]? Progressing: []? Met: []? Not Met: []? Adjusted    Overall Progression Towards Functional goals/ Treatment Progress Update:  [] Patient is progressing as expected towards functional goals listed. [x] Progression is slowed due to complexities/Impairments listed. [] Progression has been slowed due to co-morbidities. [] Plan just implemented, too soon to assess goals progression <30days   [] Goals require adjustment due to lack of progress  [] Patient is not progressing as expected and requires additional follow up with physician  [] Other    Persisting Functional Limitations/Impairments:  [x]Sleeping []Sitting               [x]Standing [x]Transfers        [x]Walking []Kneeling               [x]Stairs [x]Squatting / bending   [x]ADLs [x]Reaching  [x]Lifting  [x]Housework  []Driving []Job related tasks  []Sports/Recreation []Other:      ASSESSMENT: Emphasis on leg strengthening, functional mobility and balance training this date.  She was able to perform leg press, HS curls, knee extensions, open books and BUE ER strengthening without symptoms, and felt appropriately challenged. The patient is functionally performing more tasks independently at home, including vacuuming, cleaning. Treatment/Activity Tolerance:  [x] Patient able to complete tx  [] Patient limited by fatigue  [] Patient limited by pain  [] Patient limited by other medical complications  [] Other:     Prognosis: [x] Good [x] Fair  [] Poor    Patient Requires Follow-up: [x] Yes  [] No    Plan for next treatment session: assess repsonse to HEP. Begin with simple stretches and mobility for T/S, BUEs, L/S, BLEs. CONTINUE GENTLE ACTIVATION OF POSTERIOR RTC & POSTERIOR SCAPULAR & THORACIC  MUSCULATURE   ASSESS STRENGTH OF BUEs, MT, RHOMBOIDS. ASSESS FLEXIBILITY OF HAMSTRING AND LATS. PLAN: See eval. PT 2x / week for 4 weeks. [x] Continue per plan of care [] Alter current plan (see comments)  [] Plan of care initiated [] Hold pending MD visit [] Discharge    Electronically signed by: Alisa Potter, PT, DPT, OMT-C    Note: If patient does not return for scheduled/ recommended follow up visits, this note will serve as a discharge from care along with most recent update on progress.

## 2021-12-03 ENCOUNTER — HOSPITAL ENCOUNTER (OUTPATIENT)
Dept: PHYSICAL THERAPY | Age: 71
Setting detail: THERAPIES SERIES
Discharge: HOME | End: 2021-12-03
Payer: MEDICARE

## 2021-12-03 PROCEDURE — 97110 THERAPEUTIC EXERCISES: CPT

## 2021-12-03 PROCEDURE — 97140 MANUAL THERAPY 1/> REGIONS: CPT

## 2021-12-03 NOTE — FLOWSHEET NOTE
[facility]  Phone: [phone]   Fax: [phone]    Physical Therapy Daily Treatment Note  Date:  12/3/2021    Patient Name:  Rosalia Rivas    :  1950  MRN: [id number]  Medical/Treatment Diagnosis Information:  · Diagnosis: M51.34 (ICD-10-CM) - Other intervertebral disc degeneration, thoracic region  · Treatment Diagnosis: midback pain, low back pain, core weakness, decreased activity tolerance, impaired posture  Insurance/Certification information:  PT Insurance Information: medicare & [facility] aarp  Physician Information:  Referring Practitioner: Javi Mendoza MD  Plan of care signed (Y/N): [x]  Yes []  No     Date of Patient follow up with Physician:      Progress Report: []  Yes  [x]  No       Date Range for reporting period:  Beginning: 10/20/21  POC: 21  Ending: TBD    Progress report due (10 Rx/or 30 days whichever is less): visit #16 or     Recertification due (POC duration/ or 90 days whichever is less): visit #16 or 22    Visit # Insurance Allowable Auth required? Date Range    +  PMN []  Yes  [x]  No      Latex Allergy:  [x]NO      []YES  Preferred Language for Healthcare:   [x]English       []other:    Functional Scale:        Date assessed:  DEENA: raw score = 27/50; dysfunction = 54%   10/20/21   DEENA: raw score = 27/50; dysfunction = 54%   21    Pain level:  6-7/10     SUBJECTIVE: The patient was able to work outdoors for 45 min yesterday and felt good during, but middle of the night and this morning she had increased discomfort in R ribcage that just wouldn't go away. She used hot shower to relief pain, then she uses heat pack as needed to rest. She comments she would like to be able to reduce pain medication intake, but safely.     OBJECTIVE:     - improved multifidii/lumbar extensor activation   :   AROM B GHJ-  Able to reach behind head  Able to reach behind back  R GHJ flexion 180 deg  L GHJ 10/20/2021  Prepared by: Kiana Carlson     Exercises  Seated Thoracic Flexion and Rotation with Arms Crossed - 2-3 x daily - 6 x weekly - 1 sets - 8 reps - 3 hold  Standing Scapular Retraction - 2-3 x daily - 6 x weekly - 1 sets - 8 reps - 3 hold  Seated Anterior Pelvic Tilt - 2-3 x daily - 6 x weekly - 1 sets - 8 reps - 3 hold  Seated Sidebending - 2-3 x daily - 6 x weekly - 1 sets - 8 reps - 3 hold    Therapeutic Exercise and NMR EXR  [x] (27906) Provided verbal/tactile cueing for activities related to strengthening, flexibility, endurance, ROM for improvements in  [] LE / Lumbar: LE, proximal hip, and core control with self care, mobility, lifting, ambulation. [x] UE / Cervical: cervical, postural, scapular, scapulothoracic and UE control with self care, reaching, carrying, lifting, house/yardwork, driving, computer work.  [] (78379) Provided verbal/tactile cueing for activities related to improving balance, coordination, kinesthetic sense, posture, motor skill, proprioception to assist with   [] LE / lumbar: LE, proximal hip, and core control in self care, mobility, lifting, ambulation and eccentric single leg control. [] UE / cervical: cervical, scapular, scapulothoracic and UE control with self care, reaching, carrying, lifting, house/yardwork, driving, computer work.   [] (01234) Therapist is in constant attendance of 2 or more patients providing skilled therapy interventions, but not providing any significant amount of measurable one-on-one time to either patient, for improvements in  [] LE / lumbar: LE, proximal hip, and core control in self care, mobility, lifting, ambulation and eccentric single leg control. [] UE / cervical: cervical, scapular, scapulothoracic and UE control with self care, reaching, carrying, lifting, house/yardwork, driving, computer work.      NMR and Therapeutic Activities:    [] (43963 or 60837) Provided verbal/tactile cueing for activities related to improving balance, coordination, kinesthetic sense, posture, motor skill, proprioception and motor activation to allow for proper function of   [] LE: / Lumbar core, proximal hip and LE with self care and ADLs  [] UE / Cervical: cervical, postural, scapular, scapulothoracic and UE control with self care, carrying, lifting, driving, computer work.   [] (48933) Gait Re-education- Provided training and instruction to the patient for proper LE, core and proximal hip recruitment and positioning and eccentric body weight control with ambulation re-education including up and down stairs     Home Management Training / Self Care:  [] (49765) Provided self-care/home management training related to activities of daily living and compensatory training, and/or use of adaptive equipment for improvement with: ADLs and compensatory training, meal preparation, safety procedures and instruction in use of adaptive equipment, including bathing, grooming, dressing, personal hygiene, basic household cleaning and chores.      Home Exercise Program:    [] (61764) Reviewed/Progressed HEP activities related to strengthening, flexibility, endurance, ROM of   [] LE / Lumbar: core, proximal hip and LE for functional self-care, mobility, lifting and ambulation/stair navigation   [] UE / Cervical: cervical, postural, scapular, scapulothoracic and UE control with self care, reaching, carrying, lifting, house/yardwork, driving, computer work  [] (91993)Reviewed/Progressed HEP activities related to improving balance, coordination, kinesthetic sense, posture, motor skill, proprioception of   [] LE: core, proximal hip and LE for self care, mobility, lifting, and ambulation/stair navigation    [] UE / Cervical: cervical, postural,  scapular, scapulothoracic and UE control with self care, reaching, carrying, lifting, house/yardwork, driving, computer work    Manual Treatments:  PROM / STM / Oscillations-Mobs:  G-I, II, III, IV (PA's, Inf., Post.)  [x] (03582) Provided manual therapy to mobilize LE, proximal hip and/or LS spine soft tissue/joints for the purpose of modulating pain, promoting relaxation,  increasing ROM, reducing/eliminating soft tissue swelling/inflammation/restriction, improving soft tissue extensibility and allowing for proper ROM for normal function with   [x] LE / lumbar: self care, mobility, lifting and ambulation. [] UE / Cervical: self care, reaching, carrying, lifting, house/yardwork, driving, computer work. Modalities:  [] (73165) Vasopneumatic compression: Utilized vasopneumatic compression to decrease edema / swelling for the purpose of improving mobility and quad tone / recruitment which will allow for increased overall function including but not limited to self-care, transfers, ambulation, and ascending / descending stairs. Charges:  Timed Code Treatment Minutes: 40   Total Treatment Minutes: 40     [] EVAL - LOW (23427)   [] EVAL - MOD (85284)  [] EVAL - HIGH (92379)  [] RE-EVAL (09557)  [x] UU(29496) x 2       [] Ionto  [] NMR (47741) x       [] Vaso  [x] Manual (96432) x 1       [] Ultrasound  [] TA x                [] Mech Traction (77954)  [] Aquatic Therapy x      [] ES (un) (39489):   [] Home Management Training x  [] ES(attended) (59818)   [] Dry Needling 1-2 muscles (73364):  [] Dry Needling 3+ muscles (025701)  [] Group:      [] Other:     GOALS:  Patient stated goal: posture training and some strength  [x]? Progressing: []? Met: []? Not Met: []? Adjusted     Therapist goals for Patient:   Short Term Goals: To be achieved in: 2 weeks  1. Independent in HEP and progression per patient tolerance, in order to prevent re-injury. []? Progressing: [x]? Met: []? Not Met: []? Adjusted  2. Patient will have a decrease in pain to <5/10 on average facilitate improvement in movement, function, and ADLs as indicated by Functional Deficits. - about 6/10 this week   [x]? Progressing: []? Met: []? Not Met: []? Adjusted     Long Term Goals:  To be achieved in: 4 weeks  1. Disability index score of 44% or less for the NDI and/or DEENA to assist with reaching prior level of function. - still 54% impaired   [x]? Progressing: []? Met: []? Not Met: []? Adjusted  2. Patient will demonstrate increased AROM to FUNCTIONAL (FOR THE PATIENT) and WITHOUT PAIN of thoracic & lumbar spine for proper joint functioning as indicated by patients Functional Deficits. []? Progressing: [x]? Met: []? Not Met: []? Adjusted  3. Patient will demonstrate an increase in postural awareness and control IN STANDING FOR UP TO 60 SECONDS to allow for proper functional mobility as indicated by patients Functional Deficits. []? Progressing: [x]? Met: []? Not Met: []? Adjusted  4. Patient will demonstrate an increase in Strength to >4/5 proximal hip and core activation >6/10 tvAbd to allow for proper functional mobility as indicated by patients Functional Deficits. - NOT ASSESSED  [x]? Progressing: []? Met: []? Not Met: []? Adjusted  5. Patient will return to functional activities including bending over sink to wash dishes or brush teeth without increased symptoms or restriction. [x]? Progressing: []? Met: []? Not Met: []? Adjusted    Overall Progression Towards Functional goals/ Treatment Progress Update:  [] Patient is progressing as expected towards functional goals listed. [x] Progression is slowed due to complexities/Impairments listed. [] Progression has been slowed due to co-morbidities.   [] Plan just implemented, too soon to assess goals progression <30days   [] Goals require adjustment due to lack of progress  [] Patient is not progressing as expected and requires additional follow up with physician  [] Other    Persisting Functional Limitations/Impairments:  [x]Sleeping []Sitting               [x]Standing [x]Transfers        [x]Walking []Kneeling               [x]Stairs [x]Squatting / bending   [x]ADLs [x]Reaching  [x]Lifting  [x]Housework  []Driving []Job related tasks  []Sports/Recreation []Other:      ASSESSMENT: The patient was able to feel strong stretch (with only slight relief) with ball belt traction this date; this was attempted in effort to stretch T/L joint capsules, soft tissues. The patient was fatigued by end of session. While pain has not changed much, she is certainly more functional at home and therapy, has been able to perform more activities and for longer periods of time. More strength and endurance training will facilitate ongoing results. Treatment/Activity Tolerance:  [x] Patient able to complete tx  [] Patient limited by fatigue  [] Patient limited by pain  [] Patient limited by other medical complications  [] Other:     Prognosis: [x] Good [x] Fair  [] Poor    Patient Requires Follow-up: [x] Yes  [] No    Plan for next treatment session: stretches and mobility for T/S, BUEs, L/S, BLEs. CONTINUE GENTLE ACTIVATION OF POSTERIOR RTC & POSTERIOR SCAPULAR & THORACIC  MUSCULATURE    PLAN: See eval. PT 2x / week for 4 weeks. [x] Continue per plan of care [] Alter current plan (see comments)  [] Plan of care initiated [] Hold pending MD visit [] Discharge    Electronically signed by: Anastasiia Tavera, PT, DPT, OMT-C    Note: If patient does not return for scheduled/ recommended follow up visits, this note will serve as a discharge from care along with most recent update on progress.

## 2021-12-07 ENCOUNTER — HOSPITAL ENCOUNTER (OUTPATIENT)
Dept: PHYSICAL THERAPY | Age: 71
Setting detail: THERAPIES SERIES
Discharge: HOME | End: 2021-12-07
Payer: MEDICARE

## 2021-12-07 PROCEDURE — 97140 MANUAL THERAPY 1/> REGIONS: CPT

## 2021-12-07 PROCEDURE — 97110 THERAPEUTIC EXERCISES: CPT

## 2021-12-07 NOTE — FLOWSHEET NOTE
[facility]patient Physical Therapy  Phone: [phone]   Fax: [phone]    Physical Therapy Daily Treatment Note  Date:  2021    Patient Name:  Shelia Lee    :  1950  MRN: [id number]  Medical/Treatment Diagnosis Information:  · Diagnosis: M51.34 (ICD-10-CM) - Other intervertebral disc degeneration, thoracic region  · Treatment Diagnosis: midback pain, low back pain, core weakness, decreased activity tolerance, impaired posture  Insurance/Certification information:  PT Insurance Information: medicare & [facility] aarp  Physician Information:  Referring Practitioner: Ginette Delgadillo MD  Plan of care signed (Y/N): [x]  Yes []  No     Date of Patient follow up with Physician:      Progress Report: []  Yes  [x]  No       Date Range for reporting period:  Beginning: 10/20/21  POC: 21  Ending: TBD    Progress report due (10 Rx/or 30 days whichever is less): visit #16 or     Recertification due (POC duration/ or 90 days whichever is less): visit #16 or 22    Visit # Insurance Allowable Auth required? Date Range    + 3/8 PMN []  Yes  [x]  No      Latex Allergy:  [x]NO      []YES  Preferred Language for Healthcare:   [x]English       []other:    Functional Scale:        Date assessed:  DEENA: raw score = 27/50; dysfunction = 54%   10/20/21   DEENA: raw score = 27/50; dysfunction = 54%   21    Pain level:  7-8/10     SUBJECTIVE: Pt reports she reached behind her back to adjust her heat pad at home on Saturday night and she felt a mm spasm. Since then she has noticed more pain. OBJECTIVE:     - improved multifidii/lumbar extensor activation   :   AROM B GHJ-  Able to reach behind head  Able to reach behind back  R GHJ flexion 180 deg  L GHJ flexion 100 deg  R GHJ abd 180 deg   L GHJ abd 90 deg      - improving tolerance to stretch; +1 TTP R rib 8, 9 intercostals  Patient ambulates with forward head and rounded shoulders.  Kyphosis at T-spine.      RESTRICTIONS/PRECAUTIONS: R lung tumor    Exercises/Interventions:     Therapeutic Exercises (74461) Resistance / level Sets/sec Reps Notes   UBE   RPM 90  2 min forward, 2 min retro  Recumbent Bike    SEATED T/S ROTATION R/L      STANDING SCAP RETRACTION        Seated Spinal Flexion  10 sec x8 12/3 - added  12/7 seated with BS roll out    Standing Spinal Sidebend  1 10x to L 12/3 - modified with SB on wall    SEATED ANTERIOR PELVIC TILT      Standing PVC T/L:  Rotation R/L  Flexion/Extension  Flexion to  90 deg  BUE Extension   11/9 - added  11/12: PVC behind back   SB flexion roll ups with PPT  SB rainbows on wall   Thor ext over SB on wall     Cables:  Mid row (overhand/underhand)  LPD  High Row   11/5 - modified sets/reps  11/9 - added   TB BUE ER orange 2 10 Bent-over Single Arm Row + R Trunk Rotation 12/3 - added; PT provides assist w/ scapular retraction + depression   BUE ER @ WALL 11/9 - added   TB BUE Low T's BUE TB Flexion Wall Slide Pulleys: Flexion & Abduction Forward Bent [place] Standing Segmental Flexion/Extension Standing SB Rollout (BUE) TB 3 way (Low T's, Diagonal 1/2)        TB mid rows  [employer] (B1, L4) Quantum Knee Ext (B3, KD, A3) Quantum HS Curls (B4, K2, A3) Left Sidelying Open Book  5'' 10        Therapeutic Activities (07006)       Sit to side lying  Side lying to supine  Supine to side lying  Side lying to sit       Reviewed pillow position for laying supine and on her L side  Crate carries   Crate squat to chair  Cabinet reach  CC Row + Alt March [employer] Walkouts: fwd/retro        Neuromuscular Re-ed (23506)       Thor ext over small bolster in sitting  Scap retraction around small bolster in sitting    1  1   x15  x15        pec stretch in door frame     1/2 foam roll along spine with PPT and chin tuck      1/2 foam roll along spine with CT and UE flexion     Standing SLR Abd           Manual Intervention (44058)        DTM to Bilateral UT, LS, pecs, teres minor in sitting    L sidelying over 2 pillows - STM to R intercostals from around T4 to T10 anteriorly and posteriorly; QL stretch, R T/S rotation stretch   Left Sidelying STM ribs, intercostals, QL, obliques, trigger point release to R paraspinals in mid thor region    12 min Intermittent Ball/Belt Lumbar Traction  12/3 - stretch felt                   Modalities:   12/7: MHP to R paraspinals in L sidelying x 10 min   11/12: MHP x 5 min to L intercostals in L sidelying   10/29: MHP to UTs B in sitting x 10 min    Pt. Education:  11/12: Reassessed goals, discussed progress made and areas remaining for improvement, issued outcome measure and reviewed new score with pt as compared to diana, advised pt to avoid yard work for now since it flared her up so much, also educated pt on massage at intercostals and reminder to stand straight so not compression on R ribs    -patient educated on diagnosis, prognosis and expectations for rehab  -all patient questions were answered    Home Exercise Program:  Access Code: [email or web address]  URL: [email or web address]/  Date: 11/29/2021  Prepared by: Porsche Coombs    Exercises  Sidelying Thoracic Rotation with Open Book - 1-2 x daily - 6 x weekly - 1 sets - 6-8 reps - 5 hold    Access Code: [email or web address]  URL: [email or web address]  Date: 10/22/2021  Prepared by: Venice Munoz    Exercises  Thor ext with SB on wall  - 1 x daily - 7 x weekly - 3 sets - 10 reps  BS roll up on wall - 1 x daily - 7 x weekly - 3 sets - 10 reps  Seated Thoracic Lumbar Extension - 1 x daily - 7 x weekly - 3 sets - 10 reps  Doorway Pec Stretch at 120 Degrees Abduction - 1 x daily - 7 x weekly - 3 sets - 10 reps  Seated Scapular Retraction - 1 x daily - 7 x weekly - 3 sets - 10 reps  Seated Bilateral Shoulder External Rotation with Resistance - 1 x daily - 7 x weekly - 3 sets - 10 reps    Access Code: [email or web address]  URL: [email or web address]/  Date: 10/20/2021  Prepared by: Porsche Coombs     Exercises  Seated Thoracic Flexion and Rotation with Arms Crossed - 2-3 x daily - 6 x weekly - 1 sets - 8 reps - 3 hold  Standing Scapular Retraction - 2-3 x daily - 6 x weekly - 1 sets - 8 reps - 3 hold  Seated Anterior Pelvic Tilt - 2-3 x daily - 6 x weekly - 1 sets - 8 reps - 3 hold  Seated Sidebending - 2-3 x daily - 6 x weekly - 1 sets - 8 reps - 3 hold    Therapeutic Exercise and NMR EXR  [x] (83225) Provided verbal/tactile cueing for activities related to strengthening, flexibility, endurance, ROM for improvements in  [] LE / Lumbar: LE, proximal hip, and core control with self care, mobility, lifting, ambulation. [x] UE / Cervical: cervical, postural, scapular, scapulothoracic and UE control with self care, reaching, carrying, lifting, house/yardwork, driving, computer work.  [] (16796) Provided verbal/tactile cueing for activities related to improving balance, coordination, kinesthetic sense, posture, motor skill, proprioception to assist with   [] LE / lumbar: LE, proximal hip, and core control in self care, mobility, lifting, ambulation and eccentric single leg control. [] UE / cervical: cervical, scapular, scapulothoracic and UE control with self care, reaching, carrying, lifting, house/yardwork, driving, computer work.   [] (07052) Therapist is in constant attendance of 2 or more patients providing skilled therapy interventions, but not providing any significant amount of measurable one-on-one time to either patient, for improvements in  [] LE / lumbar: LE, proximal hip, and core control in self care, mobility, lifting, ambulation and eccentric single leg control. [] UE / cervical: cervical, scapular, scapulothoracic and UE control with self care, reaching, carrying, lifting, house/yardwork, driving, computer work.      NMR and Therapeutic Activities:    [] (45587 or 00142) Provided verbal/tactile cueing for activities related to improving balance, coordination, kinesthetic sense, posture, motor skill, proprioception and motor activation to allow for proper function of   [] LE: / Lumbar core, proximal hip and LE with self care and ADLs  [] UE / Cervical: cervical, postural, scapular, scapulothoracic and UE control with self care, carrying, lifting, driving, computer work.   [] (83740) Gait Re-education- Provided training and instruction to the patient for proper LE, core and proximal hip recruitment and positioning and eccentric body weight control with ambulation re-education including up and down stairs     Home Management Training / Self Care:  [] (88376) Provided self-care/home management training related to activities of daily living and compensatory training, and/or use of adaptive equipment for improvement with: ADLs and compensatory training, meal preparation, safety procedures and instruction in use of adaptive equipment, including bathing, grooming, dressing, personal hygiene, basic household cleaning and chores.      Home Exercise Program:    [] (05404) Reviewed/Progressed HEP activities related to strengthening, flexibility, endurance, ROM of   [] LE / Lumbar: core, proximal hip and LE for functional self-care, mobility, lifting and ambulation/stair navigation   [] UE / Cervical: cervical, postural, scapular, scapulothoracic and UE control with self care, reaching, carrying, lifting, house/yardwork, driving, computer work  [] (74650)Reviewed/Progressed HEP activities related to improving balance, coordination, kinesthetic sense, posture, motor skill, proprioception of   [] LE: core, proximal hip and LE for self care, mobility, lifting, and ambulation/stair navigation    [] UE / Cervical: cervical, postural,  scapular, scapulothoracic and UE control with self care, reaching, carrying, lifting, house/yardwork, driving, computer work    Manual Treatments:  PROM / STM / Oscillations-Mobs:  G-I, II, III, IV (PA's, Inf., Post.)  [x] (59881) Provided manual therapy to mobilize LE, proximal hip and/or LS spine soft tissue/joints for the purpose of modulating pain, promoting relaxation,  increasing ROM, reducing/eliminating soft tissue swelling/inflammation/restriction, improving soft tissue extensibility and allowing for proper ROM for normal function with   [x] LE / lumbar: self care, mobility, lifting and ambulation. [] UE / Cervical: self care, reaching, carrying, lifting, house/yardwork, driving, computer work. Modalities:  [] (48085) Vasopneumatic compression: Utilized vasopneumatic compression to decrease edema / swelling for the purpose of improving mobility and quad tone / recruitment which will allow for increased overall function including but not limited to self-care, transfers, ambulation, and ascending / descending stairs. Charges:  Timed Code Treatment Minutes: 32   Total Treatment Minutes: 42     [] EVAL - LOW (56041)   [] EVAL - MOD (45258)  [] EVAL - HIGH (51882)  [] RE-EVAL (95039)  [x] ZT(24442) x 1      [] Ionto  [] NMR (67104) x       [] Vaso  [x] Manual (73606) x 1       [] Ultrasound  [] TA x                [] Mech Traction (91248)  [] Aquatic Therapy x      [] ES (un) (78203):   [] Home Management Training x  [] ES(attended) (23954)   [] Dry Needling 1-2 muscles (75135):  [] Dry Needling 3+ muscles (392444)  [] Group:      [] Other:     GOALS:  Patient stated goal: posture training and some strength  [x]? Progressing: []? Met: []? Not Met: []? Adjusted     Therapist goals for Patient:   Short Term Goals: To be achieved in: 2 weeks  1. Independent in HEP and progression per patient tolerance, in order to prevent re-injury. []? Progressing: [x]? Met: []? Not Met: []? Adjusted  2. Patient will have a decrease in pain to <5/10 on average facilitate improvement in movement, function, and ADLs as indicated by Functional Deficits. - about 6/10 this week   [x]? Progressing: []? Met: []? Not Met: []? Adjusted     Long Term Goals: To be achieved in: 4 weeks  1.  Disability index score of 44% or less for the NDI and/or DEENA to assist with reaching prior level of function. - still 54% impaired   [x]? Progressing: []? Met: []? Not Met: []? Adjusted  2. Patient will demonstrate increased AROM to FUNCTIONAL (FOR THE PATIENT) and WITHOUT PAIN of thoracic & lumbar spine for proper joint functioning as indicated by patients Functional Deficits. []? Progressing: [x]? Met: []? Not Met: []? Adjusted  3. Patient will demonstrate an increase in postural awareness and control IN STANDING FOR UP TO 60 SECONDS to allow for proper functional mobility as indicated by patients Functional Deficits. []? Progressing: [x]? Met: []? Not Met: []? Adjusted  4. Patient will demonstrate an increase in Strength to >4/5 proximal hip and core activation >6/10 tvAbd to allow for proper functional mobility as indicated by patients Functional Deficits. - NOT ASSESSED  [x]? Progressing: []? Met: []? Not Met: []? Adjusted  5. Patient will return to functional activities including bending over sink to wash dishes or brush teeth without increased symptoms or restriction. [x]? Progressing: []? Met: []? Not Met: []? Adjusted    Overall Progression Towards Functional goals/ Treatment Progress Update:  [] Patient is progressing as expected towards functional goals listed. [x] Progression is slowed due to complexities/Impairments listed. [] Progression has been slowed due to co-morbidities.   [] Plan just implemented, too soon to assess goals progression <30days   [] Goals require adjustment due to lack of progress  [] Patient is not progressing as expected and requires additional follow up with physician  [] Other    Persisting Functional Limitations/Impairments:  [x]Sleeping []Sitting               [x]Standing [x]Transfers        [x]Walking []Kneeling               [x]Stairs [x]Squatting / bending   [x]ADLs [x]Reaching  [x]Lifting  [x]Housework  []Driving []Job related tasks  []Sports/Recreation []Other:      ASSESSMENT: Pt complaining of R sided pain at rib cage ever since reaching behind her back on Saturday. Improved pain with stretching and manual techniques. While pain has not changed much, she is certainly more functional at home and therapy, has been able to perform more activities and for longer periods of time. More strength and endurance training will facilitate ongoing results. Treatment/Activity Tolerance:  [x] Patient able to complete tx  [] Patient limited by fatigue  [] Patient limited by pain  [] Patient limited by other medical complications  [] Other:     Prognosis: [x] Good [x] Fair  [] Poor    Patient Requires Follow-up: [x] Yes  [] No    Plan for next treatment session: stretches and mobility for T/S, BUEs, L/S, BLEs. CONTINUE GENTLE ACTIVATION OF POSTERIOR RTC & POSTERIOR SCAPULAR & THORACIC  MUSCULATURE    PLAN: See diana. PT 2x / week for 4 weeks. [x] Continue per plan of care [] Alter current plan (see comments)  [] Plan of care initiated [] Hold pending MD visit [] Discharge    Electronically signed by: Alina Tay, PT, DPT    Note: If patient does not return for scheduled/ recommended follow up visits, this note will serve as a discharge from care along with most recent update on progress.

## 2021-12-10 ENCOUNTER — HOSPITAL ENCOUNTER (OUTPATIENT)
Dept: PHYSICAL THERAPY | Age: 71
Setting detail: THERAPIES SERIES
Discharge: HOME | End: 2021-12-10
Payer: MEDICARE

## 2021-12-10 PROCEDURE — 97140 MANUAL THERAPY 1/> REGIONS: CPT

## 2021-12-10 PROCEDURE — 97112 NEUROMUSCULAR REEDUCATION: CPT

## 2021-12-10 PROCEDURE — 97110 THERAPEUTIC EXERCISES: CPT

## 2021-12-10 NOTE — FLOWSHEET NOTE
with forward head and rounded shoulders. Kyphosis at T-spine.      RESTRICTIONS/PRECAUTIONS: R lung tumor    Exercises/Interventions:     Therapeutic Exercises (43486) Resistance / level Sets/sec Reps Notes   UBE   RPM 90  2 min forward, 2 min retro  Recumbent Bike    SEATED T/S ROTATION R/L      STANDING SCAP RETRACTION        Seated Spinal Flexion in neutral    \" diagonals both sides   10 sec      10 sec x8      x5 B  12/3 - added  12/7 seated with BS roll out    Standing Spinal Sidebend  12/3 - modified with SB on wall    SEATED ANTERIOR PELVIC TILT      Standing PVC T/L:  Rotation R/L  Flexion/Extension  Flexion to  90 deg  BUE Extension   11/9 - added  11/12: PVC behind back   SB flexion roll ups with PPT  SB rainbows on wall   Thor ext over SB on wall     Cables:  Mid row (overhand/underhand)  LPD  High Row   11/5 - modified sets/reps  11/9 - added   TB BUE ER  TB double diagonal in sitting  Green  [place]  2  1 10  10 B  12/10: VCs for relaxing L shoulderBent-over Single Arm Row + R Trunk Rotation 12/3 - added; PT provides assist w/ scapular retraction + depression   BUE ER @ WALL 11/9 - added   TB BUE Low T's BUE TB Flexion Wall Slide Pulleys: Flexion & Abduction Forward Bent [place] Standing Segmental Flexion/Extension Standing SB Rollout (BUE) TB 3 way (Low T's, Diagonal 1/2)        TB mid rows  [employer] (B1, L4) [employer] Knee Ext (B3, KD, A3) Quantum HS Curls (B4, K2, A3) Left Sidelying Open Book  5'' 10        Therapeutic Activities (59607)       Sit to side lying  Side lying to supine  Supine to side lying  Side lying to sit       Reviewed pillow position for laying supine and on her L side  Crate carries   Crate squat to chair  Cabinet reach  CC Row + Alt March [employer] Walkouts: fwd/retro        Neuromuscular Re-ed (76199)       Thor ext over small bolster in sitting  Scap retraction around small bolster in sitting    1  1   x15  x15        pec stretch in door frame     1/2 foam roll along spine with PPT and chin tuck      1/2 foam roll along spine with CT and UE flexion     Standing SLR Abd    L side bending stretch on ballet barre  UE flexion stretch on ballet barre   1  5 sec x10  x10    Thor rot with dowel across chest   1 x10 B                   Manual Intervention (98466)        DTM to Bilateral UT, LS, pecs, teres minor in sitting    L sidelying over 2 pillows - STM to R intercostals from around T4 to T10 anteriorly and posteriorly; QL stretch, R T/S rotation stretch   Left Sidelying STM ribs, intercostals, QL, obliques, trigger point release to R paraspinals in mid thor region     Intermittent Ball/Belt Lumbar Traction  12/3 - stretch felt   DTM to intercostal mm on R in low thor ribs    K tape web in between affected intercoastals 50% pull starting at spine    X 13 min             Modalities:   12/7: MHP to R paraspinals in L sidelying x 10 min   11/12: MHP x 5 min to L intercostals in L sidelying   10/29: MHP to UTs B in sitting x 10 min    Pt. Education:  11/12: Reassessed goals, discussed progress made and areas remaining for improvement, issued outcome measure and reviewed new score with pt as compared to eval, advised pt to avoid yard work for now since it flared her up so much, also educated pt on massage at intercostals and reminder to stand straight so not compression on R ribs    -patient educated on diagnosis, prognosis and expectations for rehab  -all patient questions were answered    Home Exercise Program:  Access Code: [email or web address]  URL: [email or web address]  Date: 11/29/2021  Prepared by: Doug Pina    Exercises  Sidelying Thoracic Rotation with Open Book - 1-2 x daily - 6 x weekly - 1 sets - 6-8 reps - 5 hold    Access Code: [email or web address]  URL: [email or web address]  Date: 10/22/2021  Prepared by: Leslie Sloan    Exercises  Thor ext with SB on wall  - 1 x daily - 7 x weekly - 3 sets - 10 reps  BS roll up on wall - 1 x daily - 7 x weekly - 3 sets - 10 reps  Seated Thoracic Lumbar Extension - 1 x daily - 7 x weekly - 3 sets - 10 reps  Doorway Pec Stretch at 120 Degrees Abduction - 1 x daily - 7 x weekly - 3 sets - 10 reps  Seated Scapular Retraction - 1 x daily - 7 x weekly - 3 sets - 10 reps  Seated Bilateral Shoulder External Rotation with Resistance - 1 x daily - 7 x weekly - 3 sets - 10 reps    Access Code: [email or web address]  URL: [email or web address]  Date: 10/20/2021  Prepared by: Litzy Corona     Exercises  Seated Thoracic Flexion and Rotation with Arms Crossed - 2-3 x daily - 6 x weekly - 1 sets - 8 reps - 3 hold  Standing Scapular Retraction - 2-3 x daily - 6 x weekly - 1 sets - 8 reps - 3 hold  Seated Anterior Pelvic Tilt - 2-3 x daily - 6 x weekly - 1 sets - 8 reps - 3 hold  Seated Sidebending - 2-3 x daily - 6 x weekly - 1 sets - 8 reps - 3 hold    Therapeutic Exercise and NMR EXR  [x] (23886) Provided verbal/tactile cueing for activities related to strengthening, flexibility, endurance, ROM for improvements in  [] LE / Lumbar: LE, proximal hip, and core control with self care, mobility, lifting, ambulation. [x] UE / Cervical: cervical, postural, scapular, scapulothoracic and UE control with self care, reaching, carrying, lifting, house/yardwork, driving, computer work.  [] (51954) Provided verbal/tactile cueing for activities related to improving balance, coordination, kinesthetic sense, posture, motor skill, proprioception to assist with   [] LE / lumbar: LE, proximal hip, and core control in self care, mobility, lifting, ambulation and eccentric single leg control.    [] UE / cervical: cervical, scapular, scapulothoracic and UE control with self care, reaching, carrying, lifting, house/yardwork, driving, computer work.   [] (13015) Therapist is in constant attendance of 2 or more patients providing skilled therapy interventions, but not providing any significant amount of measurable one-on-one time to either patient, for improvements in  [] LE / lumbar: LE, proximal hip, and core control in self care, mobility, lifting, ambulation and eccentric single leg control. [] UE / cervical: cervical, scapular, scapulothoracic and UE control with self care, reaching, carrying, lifting, house/yardwork, driving, computer work. NMR and Therapeutic Activities:    [x] (43753 or 82749) Provided verbal/tactile cueing for activities related to improving balance, coordination, kinesthetic sense, posture, motor skill, proprioception and motor activation to allow for proper function of   [] LE: / Lumbar core, proximal hip and LE with self care and ADLs  [x] UE / Cervical: cervical, postural, scapular, scapulothoracic and UE control with self care, carrying, lifting, driving, computer work.   [] (12651) Gait Re-education- Provided training and instruction to the patient for proper LE, core and proximal hip recruitment and positioning and eccentric body weight control with ambulation re-education including up and down stairs     Home Management Training / Self Care:  [] (49205) Provided self-care/home management training related to activities of daily living and compensatory training, and/or use of adaptive equipment for improvement with: ADLs and compensatory training, meal preparation, safety procedures and instruction in use of adaptive equipment, including bathing, grooming, dressing, personal hygiene, basic household cleaning and chores.      Home Exercise Program:    [] (73660) Reviewed/Progressed HEP activities related to strengthening, flexibility, endurance, ROM of   [] LE / Lumbar: core, proximal hip and LE for functional self-care, mobility, lifting and ambulation/stair navigation   [] UE / Cervical: cervical, postural, scapular, scapulothoracic and UE control with self care, reaching, carrying, lifting, house/yardwork, driving, computer work  [] (51336)Reviewed/Progressed HEP activities related to improving balance, coordination, kinesthetic sense, posture, motor skill, proprioception of   [] LE: core, proximal hip and LE for self care, mobility, lifting, and ambulation/stair navigation    [] UE / Cervical: cervical, postural,  scapular, scapulothoracic and UE control with self care, reaching, carrying, lifting, house/yardwork, driving, computer work    Manual Treatments:  PROM / STM / Oscillations-Mobs:  G-I, II, III, IV (PA's, Inf., Post.)  [x] (00440) Provided manual therapy to mobilize LE, proximal hip and/or LS spine soft tissue/joints for the purpose of modulating pain, promoting relaxation,  increasing ROM, reducing/eliminating soft tissue swelling/inflammation/restriction, improving soft tissue extensibility and allowing for proper ROM for normal function with   [] LE / lumbar: self care, mobility, lifting and ambulation. [x] UE / Cervical: self care, reaching, carrying, lifting, house/yardwork, driving, computer work. Modalities:  [] (11449) Vasopneumatic compression: Utilized vasopneumatic compression to decrease edema / swelling for the purpose of improving mobility and quad tone / recruitment which will allow for increased overall function including but not limited to self-care, transfers, ambulation, and ascending / descending stairs. Charges:  Timed Code Treatment Minutes: 45   Total Treatment Minutes: 45     [] EVAL - LOW (65375)   [] EVAL - MOD (88521)  [] EVAL - HIGH (13247)  [] RE-EVAL (88099)  [x] BI(97965) x 1      [] Ionto  [x] NMR (24118) x 1      [] Vaso  [x] Manual (11748) x 1       [] Ultrasound  [] TA x                [] Mech Traction (87053)  [] Aquatic Therapy x      [] ES (un) (67140):   [] Home Management Training x  [] ES(attended) (41800)   [] Dry Needling 1-2 muscles (52369):  [] Dry Needling 3+ muscles (798682)  [] Group:      [] Other:     GOALS:  Patient stated goal: posture training and some strength  [x]? Progressing: []? Met: []? Not Met: []? Adjusted     Therapist goals for Patient:   Short Term Goals:  To be achieved in: 2 weeks  1. Independent in HEP and progression per patient tolerance, in order to prevent re-injury. []? Progressing: [x]? Met: []? Not Met: []? Adjusted  2. Patient will have a decrease in pain to <5/10 on average facilitate improvement in movement, function, and ADLs as indicated by Functional Deficits. - about 6/10 this week   [x]? Progressing: []? Met: []? Not Met: []? Adjusted     Long Term Goals: To be achieved in: 4 weeks  1. Disability index score of 44% or less for the NDI and/or DEENA to assist with reaching prior level of function. - still 54% impaired   [x]? Progressing: []? Met: []? Not Met: []? Adjusted  2. Patient will demonstrate increased AROM to FUNCTIONAL (FOR THE PATIENT) and WITHOUT PAIN of thoracic & lumbar spine for proper joint functioning as indicated by patients Functional Deficits. []? Progressing: [x]? Met: []? Not Met: []? Adjusted  3. Patient will demonstrate an increase in postural awareness and control IN STANDING FOR UP TO 60 SECONDS to allow for proper functional mobility as indicated by patients Functional Deficits. []? Progressing: [x]? Met: []? Not Met: []? Adjusted  4. Patient will demonstrate an increase in Strength to >4/5 proximal hip and core activation >6/10 tvAbd to allow for proper functional mobility as indicated by patients Functional Deficits. - NOT ASSESSED  [x]? Progressing: []? Met: []? Not Met: []? Adjusted  5. Patient will return to functional activities including bending over sink to wash dishes or brush teeth without increased symptoms or restriction. [x]? Progressing: []? Met: []? Not Met: []? Adjusted    Overall Progression Towards Functional goals/ Treatment Progress Update:  [] Patient is progressing as expected towards functional goals listed. [x] Progression is slowed due to complexities/Impairments listed. [] Progression has been slowed due to co-morbidities.   [] Plan just implemented, too soon to assess goals progression <30days   [] Goals require adjustment due to lack of progress  [] Patient is not progressing as expected and requires additional follow up with physician  [] Other    Persisting Functional Limitations/Impairments:  [x]Sleeping []Sitting               [x]Standing [x]Transfers        [x]Walking []Kneeling               [x]Stairs [x]Squatting / bending   [x]ADLs [x]Reaching  [x]Lifting  [x]Housework  []Driving []Job related tasks  []Sports/Recreation []Other:      ASSESSMENT: Monitor response to K tape. R rib pain still most limiting factor. Treatment/Activity Tolerance:  [x] Patient able to complete tx  [] Patient limited by fatigue  [] Patient limited by pain  [] Patient limited by other medical complications  [] Other:     Prognosis: [x] Good [x] Fair  [] Poor    Patient Requires Follow-up: [x] Yes  [] No    Plan for next treatment session: stretches and mobility for T/S, BUEs, L/S, BLEs. CONTINUE GENTLE ACTIVATION OF POSTERIOR RTC & POSTERIOR SCAPULAR & THORACIC  MUSCULATURE    PLAN: See eval. PT 2x / week for 4 weeks. [x] Continue per plan of care [] Alter current plan (see comments)  [] Plan of care initiated [] Hold pending MD visit [] Discharge    Electronically signed by: Ziggy Marmolejo, PT, DPT    Note: If patient does not return for scheduled/ recommended follow up visits, this note will serve as a discharge from care along with most recent update on progress.

## 2021-12-14 ENCOUNTER — HOSPITAL ENCOUNTER (OUTPATIENT)
Dept: PHYSICAL THERAPY | Age: 71
Setting detail: THERAPIES SERIES
Discharge: HOME | End: 2021-12-14
Payer: MEDICARE

## 2021-12-14 PROCEDURE — 97140 MANUAL THERAPY 1/> REGIONS: CPT

## 2021-12-14 PROCEDURE — 97110 THERAPEUTIC EXERCISES: CPT

## 2021-12-14 NOTE — FLOWSHEET NOTE
[facility]  Outpatient Physical Therapy  Phone: [phone]   Fax: [phone]    Physical Therapy Daily Treatment Note  Date:  2021    Patient Name:  Jacqueline Lazo    :  1950  MRN: [id number]  Medical/Treatment Diagnosis Information:  · Diagnosis: M51.34 (ICD-10-CM) - Other intervertebral disc degeneration, thoracic region  · Treatment Diagnosis: midback pain, low back pain, core weakness, decreased activity tolerance, impaired posture  Insurance/Certification information:  PT Insurance Information: medicare & [facility]  Physician Information:  Referring Practitioner: Sonny Villa MD  Plan of care signed (Y/N): [x]  Yes []  No     Date of Patient follow up with Physician:      Progress Report: [x]  Yes  []  No       Date Range for reporting period:  Beginning: 10/20/21  POC: 21  PN: 21  Ending: TBD    Progress report due (10 Rx/or 30 days whichever is less): visit #16 or     Recertification due (POC duration/ or 90 days whichever is less): visit #16 or 22    Visit # Insurance Allowable Auth required? Date Range    +  PMN []  Yes  [x]  No      Latex Allergy:  [x]NO      []YES  Preferred Language for Healthcare:   [x]English       []other:    Functional Scale:        Date assessed:  DEENA: raw score = 27/50; dysfunction = 54%   10/20/21   DEENA: raw score = 27/50; dysfunction = 54%   21  DEENA: raw score = 27/50; dysfunction = 54%   21      Pain level:  8/10 this morning, 1/10 now      SUBJECTIVE: Pt reports she is still wearing the k-tape - not sure if it helped her pain or not. Put Aspercreme on this morning and used heat. Pain much better now than compared to this morning. Pt states she has improved about 50% since beginning therapy.       OBJECTIVE:    :      Strength LEFT RIGHT Comments   Multifidus        Rectus Abdominis        Hip Flexors 4+ 4     Hip Abductors 4 4     Hip Extensors     Unable to test as pt cannot lay prone       TA Muscle Contraction Scale     Criteria                                                                                  Score  Quality of Contraction              Not Present                                                                 []? 0              Rapid, Superificial                                                       []? 1              Just Perceptible                                                          [x]? 2                               Gentle, Slow                                                                []? 3     Substitution              Resting                                                                        []? 0              Moderate to Strong                                                     []? 1               Subtle Perceptible                                                       [x]? 2               None                                                                            []? 3     Symmetry of Contraction              Unilateral                                                                     []? 0              Bilateral/Asymmetrical                                                []? 1              Symmetrical                                                                [x]? 2     Breathing              Inability/Difficulty Breathing during contraction          []? 0              Able to hold contraction while Breathing                    [x]? 1     Holding              Able to Hold Contraction <10 s                                  []? 0              Able to Hold Contraction >10 s                                  [x]?  1                             _8_/10  Adapted from [employer], Copyright 2009      11/23 - improved multifidi/lumbar extensor activation   11/12:   AROM B GHJ-  Able to reach behind head  Able to reach behind back  R GHJ flexion 180 deg  L GHJ flexion 100 deg  R GHJ abd 180 deg   L GHJ abd 90 deg     11/9 - improving tolerance to stretch; +1 TTP R rib 8, 9 intercostals  Patient ambulates with forward head and rounded shoulders. Kyphosis at T-spine.      RESTRICTIONS/PRECAUTIONS: R lung tumor    Exercises/Interventions:     Therapeutic Exercises (95640) Resistance / level Sets/sec Reps Notes   UBE Recumbent Bike L1, S8  4 min    SEATED T/S ROTATION R/L      STANDING SCAP RETRACTION      Standing  Seated Spinal Flexion in neutral    \" diagonals both sides   12/3 - added  12/7 seated with BS roll out    Standing Spinal Sidebend  12/3 - modified with SB on wall    SEATED ANTERIOR PELVIC TILT      Standing PVC T/L:  Rotation R/L  Flexion/Extension  Flexion to  90 deg  BUE Extension   11/9 - added  11/12: PVC behind back   SB flexion roll ups with PPT  SB rainbows on wall   Thor ext over SB on wall     Cables:  Mid row (overhand/underhand)  LPD  High Row 25#  25#  1  1x10  x10  11/5 - modified sets/reps  11/9 - added   TB BUE ER  TB double diagonal in sitting Bent-over Single Arm Row + R Trunk Rotation 12/3 - added; PT provides assist w/ scapular retraction + depression   BUE ER @ WALL 11/9 - added   TB BUE Low T's BUE TB Flexion Wall Slide Pulleys: Flexion & Abduction Forward Bent [place] Standing Segmental Flexion/Extension Standing SB Rollout (BUE) TB 3 way (Low T's, Diagonal 1/2)        TB mid rows  Leg Press (B1, L4) Quantum Knee Ext (B3, KD, A3) Quantum HS Curls (B4, K2, A3) Left Sidelying Open Book  Thor ext over SB with arms out  [facility] ext over SB with arms behind head   5 sec  5 sec x10  x10                         Therapeutic Activities (35304)       Sit to side lying  Side lying to supine  Supine to side lying  Side lying to sit       Reviewed pillow position for laying supine and on her L side  Crate carries   Crate squat to chair  Cabinet reach  [facility] + [employer] Walkouts: fwd/retro        Neuromuscular Re-ed (80415)       Thor ext over small bolster in sitting  Scap retraction around small bolster in sitting       pec stretch in door frame     1/2 foam roll along spine with PPT and chin tuck      1/2 foam roll along spine with CT and UE flexion     Standing SLR Abd    L side bending stretch on ballet barre  UE flexion stretch on ballet barre      Thor rot with dowel across chest                    Manual Intervention ([id number] [place] [address])        DTM to Bilateral UT, LS, pecs, teres minor in sitting    L sidelying over 2 pillows - STM to R intercostals from around T4 to T10 anteriorly and posteriorly; QL stretch, R T/S rotation stretch   Left Sidelying STM ribs, intercostals, QL, obliques, trigger point release to R paraspinals in mid thor region    X 15 min Intermittent Ball/Belt Lumbar Traction  12/3 - stretch felt   DTM to intercostal mm on R in low thor ribs    K tape web in between affected intercoastals 50% pull starting at spine                 Modalities:   12/7: MHP to R paraspinals in L sidelying x 10 min   11/12: MHP x 5 min to L intercostals in L sidelying   10/29: MHP to UTs B in sitting x 10 min    Pt. Education:  12/14: Reassessed goals, discussed progress made and areas remaining for improvement, issued outcome measure and reviewed new score with pt as compared to diana, dicussed possibility of [address] to an HEP next week, but pt interested in dry needling and unsure if she is ready to start a program on her won because she may not be able to keep herself accountable       11/12: Reassessed goals, discussed progress made and areas remaining for improvement, issued outcome measure and reviewed new score with pt as compared to eval, advised pt to avoid yard work for now since it flared her up so much, also educated pt on massage at intercostals and reminder to stand straight so not compression on R ribs    -patient educated on diagnosis, prognosis and expectations for rehab  -all patient questions were answered    Home Exercise Program:  Access Code: [email or web address]  URL: [email or web address]  Date: 11/29/2021  Prepared by: [facility] Evonne    Exercises  Sidelying Thoracic Rotation with Open Book - 1-2 x daily - 6 x weekly - 1 sets - 6-8 reps - 5 hold    Access Code: [email or web address]  URL: Jack [email or web address]/  Date: 10/22/2021  Prepared by: Hans Vazquez    Exercises  Thor ext with SB on wall  - 1 x daily - 7 x weekly - 3 sets - 10 reps  BS roll up on wall - 1 x daily - 7 x weekly - 3 sets - 10 reps  Seated Thoracic Lumbar Extension - 1 x daily - 7 x weekly - 3 sets - 10 reps  Doorway Pec Stretch at 120 Degrees Abduction - 1 x daily - 7 x weekly - 3 sets - 10 reps  Seated Scapular Retraction - 1 x daily - 7 x weekly - 3 sets - 10 reps  Seated Bilateral Shoulder External Rotation with Resistance - 1 x daily - 7 x weekly - 3 sets - 10 reps    Access Code: [email or web address]  URL: [email or web address]  Date: 10/20/2021  Prepared by: Asher Saenz     Exercises  Seated Thoracic Flexion and Rotation with Arms Crossed - 2-3 x daily - 6 x weekly - 1 sets - 8 reps - 3 hold  Standing Scapular Retraction - 2-3 x daily - 6 x weekly - 1 sets - 8 reps - 3 hold  Seated Anterior Pelvic Tilt - 2-3 x daily - 6 x weekly - 1 sets - 8 reps - 3 hold  Seated Sidebending - 2-3 x daily - 6 x weekly - 1 sets - 8 reps - 3 hold    Therapeutic Exercise and NMR EXR  [x] (05197) Provided verbal/tactile cueing for activities related to strengthening, flexibility, endurance, ROM for improvements in  [] LE / Lumbar: LE, proximal hip, and core control with self care, mobility, lifting, ambulation. [x] UE / Cervical: cervical, postural, scapular, scapulothoracic and UE control with self care, reaching, carrying, lifting, house/yardwork, driving, computer work.  [] (38406) Provided verbal/tactile cueing for activities related to improving balance, coordination, kinesthetic sense, posture, motor skill, proprioception to assist with   [] LE / lumbar: LE, proximal hip, and core control in self care, mobility, lifting, ambulation and eccentric single leg control.    [] UE / cervical: cervical, scapular, scapulothoracic and UE control with self care, reaching, carrying, lifting, house/yardwork, driving, computer work.   [] (89269) Therapist is in constant attendance of 2 or more patients providing skilled therapy interventions, but not providing any significant amount of measurable one-on-one time to either patient, for improvements in  [] LE / lumbar: LE, proximal hip, and core control in self care, mobility, lifting, ambulation and eccentric single leg control. [] UE / cervical: cervical, scapular, scapulothoracic and UE control with self care, reaching, carrying, lifting, house/yardwork, driving, computer work. NMR and Therapeutic Activities:    [] (51513 or 57633) Provided verbal/tactile cueing for activities related to improving balance, coordination, kinesthetic sense, posture, motor skill, proprioception and motor activation to allow for proper function of   [] LE: / Lumbar core, proximal hip and LE with self care and ADLs  [] UE / Cervical: cervical, postural, scapular, scapulothoracic and UE control with self care, carrying, lifting, driving, computer work.   [] (64739) Gait Re-education- Provided training and instruction to the patient for proper LE, core and proximal hip recruitment and positioning and eccentric body weight control with ambulation re-education including up and down stairs     Home Management Training / Self Care:  [] (86256) Provided self-care/home management training related to activities of daily living and compensatory training, and/or use of adaptive equipment for improvement with: ADLs and compensatory training, meal preparation, safety procedures and instruction in use of adaptive equipment, including bathing, grooming, dressing, personal hygiene, basic household cleaning and chores.      Home Exercise Program:    [] (36384) Reviewed/Progressed HEP activities related to strengthening, flexibility, endurance, ROM of   [] LE / Lumbar: core, proximal hip and LE for functional self-care, mobility, lifting and ambulation/stair navigation   [] UE / Cervical: cervical, postural, scapular, scapulothoracic and UE control with self care, reaching, carrying, lifting, house/yardwork, driving, computer work  [] (65015)Reviewed/Progressed HEP activities related to improving balance, coordination, kinesthetic sense, posture, motor skill, proprioception of   [] LE: core, proximal hip and LE for self care, mobility, lifting, and ambulation/stair navigation    [] UE / Cervical: cervical, postural,  scapular, scapulothoracic and UE control with self care, reaching, carrying, lifting, house/yardwork, driving, computer work    Manual Treatments:  PROM / STM / Oscillations-Mobs:  G-I, II, III, IV (PA's, Inf., Post.)  [x] (55098) Provided manual therapy to mobilize LE, proximal hip and/or LS spine soft tissue/joints for the purpose of modulating pain, promoting relaxation,  increasing ROM, reducing/eliminating soft tissue swelling/inflammation/restriction, improving soft tissue extensibility and allowing for proper ROM for normal function with   [] LE / lumbar: self care, mobility, lifting and ambulation. [x] UE / Cervical: self care, reaching, carrying, lifting, house/yardwork, driving, computer work. Modalities:  [] (84749) Vasopneumatic compression: Utilized vasopneumatic compression to decrease edema / swelling for the purpose of improving mobility and quad tone / recruitment which will allow for increased overall function including but not limited to self-care, transfers, ambulation, and ascending / descending stairs.       Charges:  Timed Code Treatment Minutes: 45   Total Treatment Minutes: 45     [] EVAL - LOW (95502)   [] EVAL - MOD (08784)  [] EVAL - HIGH (51118)  [] RE-EVAL (50451)  [x] YM(93718) x  2     [] Ionto  [] NMR (77397) x       [] Vaso  [x] Manual (07759) x 1       [] Ultrasound  [] TA x                [] Mech Traction (75844)  [] Aquatic Therapy x      [] ES (un) (88075):   [] Home Management Training x  [] ES(attended) (89688)   [] Dry Needling 1-2 muscles (72966):  [] Dry Needling 3+ muscles (483612)  [] Group:      [] Other:     GOALS:  Patient stated goal: posture training and some strength  [x]? Progressing: []? Met: []? Not Met: []? Adjusted     Therapist goals for Patient:   Short Term Goals: To be achieved in: 2 weeks  1. Independent in HEP and progression per patient tolerance, in order to prevent re-injury. []? Progressing: [x]? Met: []? Not Met: []? Adjusted  2. Patient will have a decrease in pain to <5/10 on average facilitate improvement in movement, function, and ADLs as indicated by Functional Deficits. - about 6 or 7/10    [x]? Progressing: []? Met: []? Not Met: []? Adjusted     Long Term Goals: To be achieved in: 4 weeks  1. Disability index score of 44% or less for the NDI and/or DEENA to assist with reaching prior level of function. - still 54% impaired   [x]? Progressing: []? Met: []? Not Met: []? Adjusted  2. Patient will demonstrate increased AROM to FUNCTIONAL (FOR THE PATIENT) and WITHOUT PAIN of thoracic & lumbar spine for proper joint functioning as indicated by patients Functional Deficits. []? Progressing: [x]? Met: []? Not Met: []? Adjusted  3. Patient will demonstrate an increase in postural awareness and control IN STANDING FOR UP TO 60 SECONDS to allow for proper functional mobility as indicated by patients Functional Deficits. []? Progressing: [x]? Met: []? Not Met: []? Adjusted  4. Patient will demonstrate an increase in Strength to >4/5 proximal hip and core activation >6/10 tvAbd to allow for proper functional mobility as indicated by patients Functional Deficits. []? Progressing: [x]? Met: []? Not Met: []? Adjusted  5. Patient will return to functional activities including bending over sink to wash dishes or brush teeth without increased symptoms or restriction. []? Progressing: [x]? Met: []? Not Met: []?  Adjusted    Overall Progression Towards Functional goals/ Treatment Progress Update:  [] Patient is progressing as expected towards functional goals listed. [x] Progression is slowed due to complexities/Impairments listed. [] Progression has been slowed due to co-morbidities. [] Plan just implemented, too soon to assess goals progression <30days   [] Goals require adjustment due to lack of progress  [] Patient is not progressing as expected and requires additional follow up with physician  [] Other    Persisting Functional Limitations/Impairments:  [x]Sleeping []Sitting               [x]Standing [x]Transfers        [x]Walking []Kneeling               [x]Stairs [x]Squatting / bending   [x]ADLs [x]Reaching  [x]Lifting  [x]Housework  []Driving []Job related tasks  []Sports/Recreation []Other:      ASSESSMENT: Pt is a 65 y/o female who presented to therapy with complaints of right sided pain and poor posture. She has been seen for 13 visits as of this date. She had made good progress and met all but two goals. She demos improved posture, tolerance to standing, LE strength, and spinal mobility. She continues to complain of R rib pain and has not had a score change on DEENA since beginning therapy. Will consider DC vs continuation of therapy in next few visits. Treatment/Activity Tolerance:  [x] Patient able to complete tx  [] Patient limited by fatigue  [] Patient limited by pain  [] Patient limited by other medical complications  [] Other:     Prognosis: [x] Good [x] Fair  [] Poor    Patient Requires Follow-up: [x] Yes  [] No    Plan for next treatment session: stretches and mobility for T/S, BUEs, L/S, BLEs. CONTINUE GENTLE ACTIVATION OF POSTERIOR RTC & POSTERIOR SCAPULAR & THORACIC  MUSCULATURE    PLAN: See diana. PT 2x / week for 4 weeks.    [x] Continue per plan of care [] Alter current plan (see comments)  [] Plan of care initiated [] Hold pending MD visit [] Discharge    Electronically signed by: Anshul Allison PT,

## 2021-12-17 ENCOUNTER — HOSPITAL ENCOUNTER (OUTPATIENT)
Dept: PHYSICAL THERAPY | Age: 71
Setting detail: THERAPIES SERIES
Discharge: HOME | End: 2021-12-17
Payer: MEDICARE

## 2021-12-17 PROCEDURE — 97110 THERAPEUTIC EXERCISES: CPT

## 2021-12-17 PROCEDURE — 97140 MANUAL THERAPY 1/> REGIONS: CPT

## 2021-12-17 NOTE — FLOWSHEET NOTE
[facility]  Outpatient Physical Therapy  Phone: [phone]   Fax: [phone]    Physical Therapy Daily Treatment Note  Date:  2021    Patient Name:  Earnest Horowitz    :  1950  MRN: [id number]  Medical/Treatment Diagnosis Information:  · Diagnosis: M51.34 (ICD-10-CM) - Other intervertebral disc degeneration, thoracic region  · Treatment Diagnosis: midback pain, low back pain, core weakness, decreased activity tolerance, impaired posture  Insurance/Certification information:  PT Insurance Information: medicare & [facility]  Physician Information:  Referring Practitioner: Andrea Bonilla MD  Plan of care signed (Y/N): [x]  Yes []  No     Date of Patient follow up with Physician:      Progress Report: [x]  Yes  []  No       Date Range for reporting period:  Beginning: 10/20/21  POC: 21  PN: 21  Ending: TBD    Progress report due (10 Rx/or 30 days whichever is less): visit #16 or     Recertification due (POC duration/ or 90 days whichever is less): visit #16 or 22    Visit # Insurance Allowable Auth required? Date Range    +  PMN []  Yes  [x]  No      Latex Allergy:  [x]NO      []YES  Preferred Language for Healthcare:   [x]English       []other:    Functional Scale:        Date assessed:  DEENA: raw score = 27/50; dysfunction = 54%   10/20/21   DEENA: raw score = 27/50; dysfunction = 54%   21  DEENA: raw score = 27/50; dysfunction = 54%   21      Pain level:  10 now      SUBJECTIVE: Pt reports she did feel better after last visit. R front rib pain back today. Thinks it may be due to bowel issues.       OBJECTIVE:    :      Strength LEFT RIGHT Comments   Multifidus        Rectus Abdominis        Hip Flexors 4+ 4     Hip Abductors 4 4     Hip Extensors     Unable to test as pt cannot lay prone       TA Muscle Contraction Scale     Criteria                                                                                  Score  Quality of Contraction              Not Present                                                                 []? 0              Rapid, Superificial                                                       []? 1              Just Perceptible                                                          [x]? 2                               Gentle, Slow                                                                []? 3     Substitution              Resting                                                                        []? 0              Moderate to Strong                                                     []? 1               Subtle Perceptible                                                       [x]? 2               None                                                                            []? 3     Symmetry of Contraction              Unilateral                                                                     []? 0              Bilateral/Asymmetrical                                                []? 1              Symmetrical                                                                [x]? 2     Breathing              Inability/Difficulty Breathing during contraction          []? 0              Able to hold contraction while Breathing                    [x]? 1     Holding              Able to Hold Contraction <10 s                                  []? 0              Able to Hold Contraction >10 s                                  [x]? 1                             _8_/10  Adapted from Daniel Manzanares al, Copyright 2009      11/23 - improved multifidi/lumbar extensor activation   11/12:   AROM B GHJ-  Able to reach behind head  Able to reach behind back  R GHJ flexion 180 deg  L GHJ flexion 100 deg  R GHJ abd 180 deg   L GHJ abd 90 deg     11/9 - improving tolerance to stretch; +1 TTP R rib 8, 9 intercostals  Patient ambulates with forward head and rounded shoulders. Kyphosis at T-spine. RESTRICTIONS/PRECAUTIONS: R lung tumor    Exercises/Interventions:     Therapeutic Exercises (09921) Resistance / level Sets/sec Reps Notes   UBE Recumbent Bike    Nu-step      L4      4 min     SEATED T/S ROTATION R/L      STANDING SCAP RETRACTION      Standing  Seated Spinal Flexion in neutral    \" diagonals both sides   12/3 - added  12/7 seated with BS roll out    Standing Spinal Sidebend  12/3 - modified with SB on wall    SEATED ANTERIOR PELVIC TILT      Standing PVC T/L:  Rotation R/L  Flexion/Extension  Flexion to  90 deg  BUE Extension   11/9 - added  11/12: PVC behind back   SB flexion roll ups with PPT  SB rainbows on wall   Thor ext over SB on wall     Cables:  Mid row (overhand/underhand)  LPD  High Row   11/5 - modified sets/reps  11/9 - added   TB BUE ER  TB double diagonal in sitting Bent-over Single Arm Row + R Trunk Rotation 12/3 - added; PT provides assist w/ scapular retraction + depression   BUE ER @ WALL 11/9 - added   TB BUE Low T's BUE TB Flexion Wall Slide Pulleys: Flexion & Abduction Forward Bent [place] Standing Segmental Flexion/Extension Standing SB Rollout (BUE) TB 3 way (Low T's, Diagonal 1/2)        TB mid rows  Leg Press (B1, L4) Quantum Knee Ext (B3, KD, A3) Quantum HS Curls (B4, K2, A3) Left Sidelying Open Book  Thor ext over SB with arms out  [employer] ext over SB with arms behind head      [employer]:  Row machine  Chest press  Shoulder press  Lat raise    Free weight bicep curl  Free weight triceps     Med ball OH press     UE stretch on bars    10#  10#  10#  10#    3#  3#    4#       1  1  1  1    1  1    1    30 sec   x10  x10  x10  x10    x10  x10    x10    X 5  12/17                 Therapeutic Activities (84318)       Sit to side lying  Side lying to supine  Supine to side lying  Side lying to sit       Reviewed pillow position for laying supine and on her L side  Crate carries   Crate squat to chair  Cabinet reach  [facility] + [employer] Walkouts: fwd/retro Neuromuscular Re-ed (26435)       Thor ext over small bolster in sitting  Scap retraction around small bolster in sitting       pec stretch in door frame     1/2 foam roll along spine with PPT and chin tuck      1/2 foam roll along spine with CT and UE flexion     Standing SLR Abd    L side bending stretch on ballet barre  UE flexion stretch on ballet barre      Thor rot with dowel across chest                    Manual Intervention ([phone])        DTM to Bilateral UT, LS, pecs, teres minor in sitting    L sidelying over 2 pillows - STM to R intercostals from around T4 to T10 anteriorly and posteriorly; QL stretch, R T/S rotation stretch   Left Sidelying STM ribs, intercostals, QL, obliques, trigger point release to R paraspinals in mid thor region    X 8 min Intermittent Ball/Belt Lumbar Traction  12/3 - stretch felt   DTM to intercostal mm on R in low thor ribs    K tape web in between affected intercoastals 50% pull starting at spine        theracane education and trial    X 3 min       Modalities:   12/7: MHP to R paraspinals in L sidelying x 10 min   11/12: MHP x 5 min to L intercostals in L sidelying   10/29: MHP to UTs B in sitting x 10 min    Pt.  Education:  12/14: Reassessed goals, discussed progress made and areas remaining for improvement, issued outcome measure and reviewed new score with pt as compared to diana, dicussed possibility of [address] to an HEP next week, but pt interested in dry needling and unsure if she is ready to start a program on her won because she may not be able to keep herself accountable       11/12: Reassessed goals, discussed progress made and areas remaining for improvement, issued outcome measure and reviewed new score with pt as compared to eval, advised pt to avoid yard work for now since it flared her up so much, also educated pt on massage at intercostals and reminder to stand straight so not compression on R ribs    -patient educated on diagnosis, prognosis and expectations for rehab  -all patient questions were answered    Home Exercise Program:  Access Code: [email or web address]  URL: [employer]/  Date: 11/29/2021  Prepared by: Alfredo Torrez    Exercises  Sidelying Thoracic Rotation with Open Book - 1-2 x daily - 6 x weekly - 1 sets - 6-8 reps - 5 hold    Access Code: [email or web address]  URL: [email or web address]  Date: 10/22/2021  Prepared by: Dino Albarran    Exercises  Thor ext with SB on wall  - 1 x daily - 7 x weekly - 3 sets - 10 reps  BS roll up on wall - 1 x daily - 7 x weekly - 3 sets - 10 reps  Seated Thoracic Lumbar Extension - 1 x daily - 7 x weekly - 3 sets - 10 reps  Doorway Pec Stretch at 120 Degrees Abduction - 1 x daily - 7 x weekly - 3 sets - 10 reps  Seated Scapular Retraction - 1 x daily - 7 x weekly - 3 sets - 10 reps  Seated Bilateral Shoulder External Rotation with Resistance - 1 x daily - 7 x weekly - 3 sets - 10 reps    Access Code: [email or web address]  URL: [employer]/  Date: 10/20/2021  Prepared by: Alfredo Torrez     Exercises  Seated Thoracic Flexion and Rotation with Arms Crossed - 2-3 x daily - 6 x weekly - 1 sets - 8 reps - 3 hold  Standing Scapular Retraction - 2-3 x daily - 6 x weekly - 1 sets - 8 reps - 3 hold  Seated Anterior Pelvic Tilt - 2-3 x daily - 6 x weekly - 1 sets - 8 reps - 3 hold  Seated Sidebending - 2-3 x daily - 6 x weekly - 1 sets - 8 reps - 3 hold    Therapeutic Exercise and NMR EXR  [x] (87619) Provided verbal/tactile cueing for activities related to strengthening, flexibility, endurance, ROM for improvements in  [] LE / Lumbar: LE, proximal hip, and core control with self care, mobility, lifting, ambulation.   [x] UE / Cervical: cervical, postural, scapular, scapulothoracic and UE control with self care, reaching, carrying, lifting, house/yardwork, driving, computer work.  [] (42766) Provided verbal/tactile cueing for activities related to improving balance, coordination, kinesthetic sense, posture, motor skill, proprioception to assist with   [] LE / lumbar: LE, proximal hip, and core control in self care, mobility, lifting, ambulation and eccentric single leg control. [] UE / cervical: cervical, scapular, scapulothoracic and UE control with self care, reaching, carrying, lifting, house/yardwork, driving, computer work.   [] (05372) Therapist is in constant attendance of 2 or more patients providing skilled therapy interventions, but not providing any significant amount of measurable one-on-one time to either patient, for improvements in  [] LE / lumbar: LE, proximal hip, and core control in self care, mobility, lifting, ambulation and eccentric single leg control. [] UE / cervical: cervical, scapular, scapulothoracic and UE control with self care, reaching, carrying, lifting, house/yardwork, driving, computer work. NMR and Therapeutic Activities:    [] (31848 or 88667) Provided verbal/tactile cueing for activities related to improving balance, coordination, kinesthetic sense, posture, motor skill, proprioception and motor activation to allow for proper function of   [] LE: / Lumbar core, proximal hip and LE with self care and ADLs  [] UE / Cervical: cervical, postural, scapular, scapulothoracic and UE control with self care, carrying, lifting, driving, computer work.   [] (34423) Gait Re-education- Provided training and instruction to the patient for proper LE, core and proximal hip recruitment and positioning and eccentric body weight control with ambulation re-education including up and down stairs     Home Management Training / Self Care:  [] (85905) Provided self-care/home management training related to activities of daily living and compensatory training, and/or use of adaptive equipment for improvement with: ADLs and compensatory training, meal preparation, safety procedures and instruction in use of adaptive equipment, including bathing, grooming, dressing, personal hygiene, basic household cleaning and chores.      Home Exercise Program:    [] (79026) Reviewed/Progressed HEP activities related to strengthening, flexibility, endurance, ROM of   [] LE / Lumbar: core, proximal hip and LE for functional self-care, mobility, lifting and ambulation/stair navigation   [] UE / Cervical: cervical, postural, scapular, scapulothoracic and UE control with self care, reaching, carrying, lifting, house/yardwork, driving, computer work  [] (39502)Reviewed/Progressed HEP activities related to improving balance, coordination, kinesthetic sense, posture, motor skill, proprioception of   [] LE: core, proximal hip and LE for self care, mobility, lifting, and ambulation/stair navigation    [] UE / Cervical: cervical, postural,  scapular, scapulothoracic and UE control with self care, reaching, carrying, lifting, house/yardwork, driving, computer work    Manual Treatments:  PROM / STM / Oscillations-Mobs:  G-I, II, III, IV (PA's, Inf., Post.)  [x] (61301) Provided manual therapy to mobilize LE, proximal hip and/or LS spine soft tissue/joints for the purpose of modulating pain, promoting relaxation,  increasing ROM, reducing/eliminating soft tissue swelling/inflammation/restriction, improving soft tissue extensibility and allowing for proper ROM for normal function with   [] LE / lumbar: self care, mobility, lifting and ambulation. [x] UE / Cervical: self care, reaching, carrying, lifting, house/yardwork, driving, computer work. Modalities:  [] (35625) Vasopneumatic compression: Utilized vasopneumatic compression to decrease edema / swelling for the purpose of improving mobility and quad tone / recruitment which will allow for increased overall function including but not limited to self-care, transfers, ambulation, and ascending / descending stairs.       Charges:  Timed Code Treatment Minutes: 48   Total Treatment Minutes: 48     [] EVAL - LOW (64719)   [] EVAL - MOD (73101)  [] EVAL - HIGH (12212)  [] RE-EVAL (80029)  [x] SOUTH(60818) x  2     [] Ionto  [] NMR (46408) x       [] Vaso  [x] Manual (31586) x 1       [] Ultrasound  [] TA x                [] Mech Traction (61210)  [] Aquatic Therapy x      [] ES (un) (88603):   [] Home Management Training x  [] ES(attended) (84468)   [] Dry Needling 1-2 muscles (98775):  [] Dry Needling 3+ muscles (963457)  [] Group:      [] Other:     GOALS:  Patient stated goal: posture training and some strength  [x]? Progressing: []? Met: []? Not Met: []? Adjusted     Therapist goals for Patient:   Short Term Goals: To be achieved in: 2 weeks  1. Independent in HEP and progression per patient tolerance, in order to prevent re-injury. []? Progressing: [x]? Met: []? Not Met: []? Adjusted  2. Patient will have a decrease in pain to <5/10 on average facilitate improvement in movement, function, and ADLs as indicated by Functional Deficits. - about 6 or 7/10    [x]? Progressing: []? Met: []? Not Met: []? Adjusted     Long Term Goals: To be achieved in: 4 weeks  1. Disability index score of 44% or less for the NDI and/or DEENA to assist with reaching prior level of function. - still 54% impaired   [x]? Progressing: []? Met: []? Not Met: []? Adjusted  2. Patient will demonstrate increased AROM to FUNCTIONAL (FOR THE PATIENT) and WITHOUT PAIN of thoracic & lumbar spine for proper joint functioning as indicated by patients Functional Deficits. []? Progressing: [x]? Met: []? Not Met: []? Adjusted  3. Patient will demonstrate an increase in postural awareness and control IN STANDING FOR UP TO 60 SECONDS to allow for proper functional mobility as indicated by patients Functional Deficits. []? Progressing: [x]? Met: []? Not Met: []? Adjusted  4. Patient will demonstrate an increase in Strength to >4/5 proximal hip and core activation >6/10 tvAbd to allow for proper functional mobility as indicated by patients Functional Deficits. []? Progressing: [x]? Met: []? Not Met: []? Adjusted  5.  Patient will return to functional activities including bending over sink to wash dishes or brush teeth without increased symptoms or restriction. []? Progressing: [x]? Met: []? Not Met: []? Adjusted    Overall Progression Towards Functional goals/ Treatment Progress Update:  [] Patient is progressing as expected towards functional goals listed. [x] Progression is slowed due to complexities/Impairments listed. [] Progression has been slowed due to co-morbidities. [] Plan just implemented, too soon to assess goals progression <30days   [] Goals require adjustment due to lack of progress  [] Patient is not progressing as expected and requires additional follow up with physician  [] Other    Persisting Functional Limitations/Impairments:  [x]Sleeping []Sitting               [x]Standing [x]Transfers        [x]Walking []Kneeling               [x]Stairs [x]Squatting / bending   [x]ADLs [x]Reaching  [x]Lifting  [x]Housework  []Driving []Job related tasks  []Sports/Recreation []Other:      ASSESSMENT: Pt is a 65 y/o female who presented to therapy with complaints of right sided pain and poor posture. She has been seen for 13 visits as of this date. She had made good progress and met all but two goals. She demos improved posture, tolerance to standing, LE strength, and spinal mobility. She continues to complain of R rib pain and has not had a score change on DEENA since beginning therapy. Will consider DC vs continuation of therapy in next few visits. Treatment/Activity Tolerance:  [x] Patient able to complete tx  [] Patient limited by fatigue  [] Patient limited by pain  [] Patient limited by other medical complications  [] Other:     Prognosis: [x] Good [x] Fair  [] Poor    Patient Requires Follow-up: [x] Yes  [] No    Plan for next treatment session: stretches and mobility for T/S, BUEs, L/S, BLEs. CONTINUE GENTLE ACTIVATION OF POSTERIOR RTC & POSTERIOR SCAPULAR & THORACIC  MUSCULATURE    PLAN: See eval. PT 2x / week for 4 weeks.    [x] Continue per plan of care [] Alter current plan (see comments)  [] Plan of care initiated [] Hold pending MD visit [] Discharge    Electronically signed by: Debra Valdez PT, DPT    Note: If patient does not return for scheduled/ recommended follow up visits, this note will serve as a discharge from care along with most recent update on progress.

## 2021-12-21 ENCOUNTER — HOSPITAL ENCOUNTER (OUTPATIENT)
Dept: PHYSICAL THERAPY | Age: 71
Setting detail: THERAPIES SERIES
Discharge: HOME | End: 2021-12-21
Payer: MEDICARE

## 2021-12-21 PROCEDURE — 97530 THERAPEUTIC ACTIVITIES: CPT

## 2021-12-21 PROCEDURE — 97110 THERAPEUTIC EXERCISES: CPT

## 2021-12-21 RX ORDER — ATORVASTATIN CALCIUM 20 MG/1
TABLET, FILM COATED ORAL
Qty: 90 TABLET | Refills: 3 | Status: SHIPPED | OUTPATIENT
Start: 2021-12-21 | End: 2022-05-29 | Stop reason: ALTCHOICE

## 2021-12-21 NOTE — DISCHARGE SUMMARY
[facility]  Outpatient Physical Therapy  Phone: [phone]   Fax: [phone]   Physical Therapy Discharge Summary    Dear   Gardenia Gil MD,    We had the pleasure of treating the following patient for physical therapy services at [facility]. A summary of our findings can be found in the discharge summary below. If you have any questions or concerns regarding these findings, please do not hesitate to contact me at the office phone number checked above.   Thank you for the referral.     Physician Signature:________________________________Date:__________________  By signing above (or electronic signature), therapists plan is approved by physician      Functional Outcome:  DEENA: raw score = 27/50; dysfunction = 54%      16.5cm R SB, 7cm L SB; WFL lumbar/thoracic flexion & extension  Knee Ext = 4+/5, HS Curls = 4+/5  Strength LEFT RIGHT Comments   Multifidus        Rectus Abdominis        Hip Flexors 4 4 seated    Hip Abductors 4 4     Hip Extensors     Unable to test as pt cannot lay prone       TA Muscle Contraction Scale     Criteria                                                                                  Score  Quality of Contraction              Not Present                                                                 []? 0              Rapid, Superificial                                                       []? 1              Just Perceptible                                                          [x]? 2                               Gentle, Slow                                                                []? 3     Substitution              Resting                                                                        []? 0              Moderate to Strong                                                     []? 1               Subtle Perceptible                                                       [x]? 2 None                                                                            []? 3     Symmetry of Contraction              Unilateral                                                                     []? 0              Bilateral/Asymmetrical                                                []? 1              Symmetrical                                                                [x]? 2     Breathing              Inability/Difficulty Breathing during contraction          []? 0              Able to hold contraction while Breathing                    [x]? 1     Holding              Able to Hold Contraction <10 s                                  []? 0              Able to Hold Contraction >10 s                                  [x]? 1                             _8_/10  Adapted from [employer], Copyright 2009    GOALS:  Patient stated goal: posture training and some strength  [x]? Progressing: []? Met: []? Not Met: []? Adjusted     Therapist goals for Patient:   Short Term Goals: To be achieved in: 2 weeks  1. Independent in HEP and progression per patient tolerance, in order to prevent re-injury. []? Progressing: [x]? Met: []? Not Met: []? Adjusted  2. Patient will have a decrease in pain to <5/10 on average facilitate improvement in movement, function, and ADLs as indicated by Functional Deficits. - about 6 or 7/10    []? Progressing: []? Met: [x]? Not Met: []? Adjusted      Long Term Goals: To be achieved in: 4 weeks  1. Disability index score of 44% or less for the NDI and/or DEENA to assist with reaching prior level of function. - still 54% impaired   []? Progressing: []? Met: [x]? Not Met: []? Adjusted  2. Patient will demonstrate increased AROM to FUNCTIONAL (FOR THE PATIENT) and WITHOUT PAIN of thoracic & lumbar spine for proper joint functioning as indicated by patients Functional Deficits. []? Progressing: []? Met: [x]? Not Met: []? Adjusted  3.  Patient will demonstrate an increase in postural awareness and control IN STANDING FOR UP TO 60 SECONDS to allow for proper functional mobility as indicated by patients Functional Deficits. []? Progressing: [x]? Met: []? Not Met: []? Adjusted  4. Patient will demonstrate an increase in Strength to >4/5 proximal hip and core activation >6/10 tvAbd to allow for proper functional mobility as indicated by patients Functional Deficits. []? Progressing: [x]? Met: []? Not Met: []? Adjusted  5. Patient will return to functional activities including bending over sink to wash dishes or brush teeth without increased symptoms or restriction. []? Progressing: [x]? Met: []? Not Met: []? Adjusted    Overall Response to Treatment:   []Patient is responding well to treatment and improvement is noted with regards to goals   []Patient should continue to improve in reasonable time if they continue HEP   [x]Patient has plateaued and is no longer responding to skilled PT intervention. Patient should continue to improve in reasonable time if they continue HEP and utilize [employer] for ongoing fitness/wellness. It was recommended the patient follow up with pain-specialist routinely as directed. In the case of new or unexpected symptoms, contact MD prior to follow up with PT.    []Patient is getting worse and would benefit from return to referring MD   []Patient unable to adhere to initial POC   []Other:     Date range of Visits: 10/20/21 - 21  Total Visits: 16    Recommendation:    [x] Discharge to HEP. Follow up with PT or MD PRN.              Physical Therapy Daily Treatment Note  Date:  2021    Patient Name:  Rosalia Rivas    :  1950  MRN: [id number]  Medical/Treatment Diagnosis Information:  · Diagnosis: M51.34 (ICD-10-CM) - Other intervertebral disc degeneration, thoracic region  · Treatment Diagnosis: midback pain, low back pain, core weakness, decreased activity tolerance, impaired posture  Insurance/Certification information:  PT Insurance Information: medicare & [facility] aarp  Physician Information:  Referring Practitioner: Ava Putnam MD  Plan of care signed (Y/N): [x]  Yes []  No     Date of Patient follow up with Physician:      Progress Report: [x]  Yes  []  No       Date Range for reporting period:  Beginning: 10/20/21  POC: 21  PN: 21  Endin21    Progress report due (10 Rx/or 30 days whichever is less): visit #16 or     Recertification due (POC duration/ or 90 days whichever is less): visit #16 or 22    Visit # Insurance Allowable Auth required? Date Range    +  PMN []  Yes  [x]  No      Latex Allergy:  [x]NO      []YES  Preferred Language for Healthcare:   [x]English       []other:    Functional Scale:        Date assessed:  DEENA: raw score = 27/50; dysfunction = 54%   10/20/21   DEENA: raw score = 27/50; dysfunction = 54%   21  DEENA: raw score = 27/50; dysfunction = 54%   21    Pain level:  7-8/10    SUBJECTIVE: The patient moved some furniture on Saturday and a few hours later noticed a \"tweak\" in her side. She didn't think it would be much to move the chairs. She comments she has hot showers and stretches, which helps somewhat with mobility. She has also used aspercream, which helps \"some. \" She is using her pain medication as prescribed, without abolishment of symptoms. OBJECTIVE:    SEE DC ABOVE   - improved multifidi/lumbar extensor activation   :   AROM B GHJ-  Able to reach behind head  Able to reach behind back  R GHJ flexion 180 deg  L GHJ flexion 100 deg  R GHJ abd 180 deg   L GHJ abd 90 deg      - improving tolerance to stretch; +1 TTP R rib 8, 9 intercostals  Patient ambulates with forward head and rounded shoulders. Kyphosis at T-spine.      RESTRICTIONS/PRECAUTIONS: R lung tumor    Exercises/Interventions:     Therapeutic Exercises (26407) Resistance / level Sets/sec Reps Notes   UBE Recumbent Bike    Nu-step     SEATED T/S ROTATION R/L      STANDING SCAP RETRACTION      Standing Crate squat to chair  Cabinet reach  CC Row + Alt March [employer] Walkouts: fwd/retro        Neuromuscular Re-ed (50949)       Thor ext over small bolster in sitting  Scap retraction around small bolster in sitting       pec stretch in door frame     1/2 foam roll along spine with PPT and chin tuck      1/2 foam roll along spine with CT and UE flexion     Standing SLR Abd    L side bending stretch on ballet barre  UE flexion stretch on ballet barre      Thor rot with dowel across chest                    Manual Intervention ([id number] [facility])        DTM to Bilateral UT, LS, pecs, teres minor in sitting    L sidelying over 2 pillows - STM to R intercostals from around T4 to T10 anteriorly and posteriorly; QL stretch, R T/S rotation stretch   Left Sidelying STM ribs, intercostals, QL, obliques, trigger point release to R paraspinals in mid thor region     Intermittent Ball/Belt Lumbar Traction  DTM to intercostal mm on R in low thor ribs    K tape web in between affected intercoastals 50% pull starting at spine        theracane education and trial          Modalities:   12/7: MHP to R paraspinals in L sidelying x 10 min   11/12: MHP x 5 min to L intercostals in L sidelying   10/29: MHP to UTs B in sitting x 10 min    Pt.  Education:  12/14: Reassessed goals, discussed progress made and areas remaining for improvement, issued outcome measure and reviewed new score with pt as compared to diana, dicussed possibility of [address] to an HEP next week, but pt interested in dry needling and unsure if she is ready to start a program on her won because she may not be able to keep herself accountable       11/12: Reassessed goals, discussed progress made and areas remaining for improvement, issued outcome measure and reviewed new score with pt as compared to eval, advised pt to avoid yard work for now since it flared her up so much, also educated pt on massage at intercostals and reminder to stand straight so not compression on R ribs    -patient educated on diagnosis, prognosis and expectations for rehab  -all patient questions were answered    Home Exercise Program:  Access Code: [email or web address]  URL: [email or web address]  Date: 12/21/2021  Prepared by: Springville Mart    Exercises  Seated Thoracic Lumbar Extension - 1 x daily - 7 x weekly - 3 sets - 10 reps  Doorway Pec Stretch at 120 Degrees Abduction - 1 x daily - 7 x weekly - 1 sets - 5 reps - 10 hold  Seated Scapular Retraction - 1-3 x daily - 7 x weekly - 1 sets - 10 reps  Sidelying Thoracic Rotation with Open Book - 1 x daily - 6 x weekly - 1 sets - 6-8 reps - 5 hold  Squat with Counter Support - 1 x daily - 7 x weekly - 2 sets - 10 reps  Shoulder Flexion Wall Slide with Towel - 1 x daily - 7 x weekly - 1 sets - 10 reps - 10 hold  Standing Row with Anchored Resistance - 1 x daily - 4 x weekly - 2-3 sets - 10 reps  Shoulder extension with resistance - Neutral - 1 x daily - 4 x weekly - 2-3 sets - 10 reps  Seated Bilateral Shoulder External Rotation with Resistance - 1 x daily - 4 x weekly - 2-3 sets - 10 reps  Standing Shoulder Horizontal Abduction with Resistance - 1 x daily - 4 x weekly - 2-3 sets - 10 reps    Access Code: [email or web address]  URL: [email or web address]  Date: 11/29/2021  Prepared by: Springville Mart    Exercises  Sidelying Thoracic Rotation with Open Book - 1-2 x daily - 6 x weekly - 1 sets - 6-8 reps - 5 hold    Access Code: [email or web address]  URL: [email or web address]  Date: 10/22/2021  Prepared by: Makayal Rm    Exercises  Thor ext with SB on wall  - 1 x daily - 7 x weekly - 3 sets - 10 reps  BS roll up on wall - 1 x daily - 7 x weekly - 3 sets - 10 reps  Seated Thoracic Lumbar Extension - 1 x daily - 7 x weekly - 3 sets - 10 reps  Doorway Pec Stretch at 120 Degrees Abduction - 1 x daily - 7 x weekly - 3 sets - 10 reps  Seated Scapular Retraction - 1 x daily - 7 x weekly - 3 sets - 10 reps  Seated Bilateral Shoulder External Rotation with Resistance - 1 x daily - 7 x weekly - 3 sets - 10 reps    Access Code: [email or web address]  URL: [email or web address]  Date: 10/20/2021  Prepared by: Renate Pitts     Exercises  Seated Thoracic Flexion and Rotation with Arms Crossed - 2-3 x daily - 6 x weekly - 1 sets - 8 reps - 3 hold  Standing Scapular Retraction - 2-3 x daily - 6 x weekly - 1 sets - 8 reps - 3 hold  Seated Anterior Pelvic Tilt - 2-3 x daily - 6 x weekly - 1 sets - 8 reps - 3 hold  Seated Sidebending - 2-3 x daily - 6 x weekly - 1 sets - 8 reps - 3 hold    Therapeutic Exercise and NMR EXR  [x] (83366) Provided verbal/tactile cueing for activities related to strengthening, flexibility, endurance, ROM for improvements in  [] LE / Lumbar: LE, proximal hip, and core control with self care, mobility, lifting, ambulation. [x] UE / Cervical: cervical, postural, scapular, scapulothoracic and UE control with self care, reaching, carrying, lifting, house/yardwork, driving, computer work.  [] (26118) Provided verbal/tactile cueing for activities related to improving balance, coordination, kinesthetic sense, posture, motor skill, proprioception to assist with   [] LE / lumbar: LE, proximal hip, and core control in self care, mobility, lifting, ambulation and eccentric single leg control. [] UE / cervical: cervical, scapular, scapulothoracic and UE control with self care, reaching, carrying, lifting, house/yardwork, driving, computer work.   [] (20670) Therapist is in constant attendance of 2 or more patients providing skilled therapy interventions, but not providing any significant amount of measurable one-on-one time to either patient, for improvements in  [] LE / lumbar: LE, proximal hip, and core control in self care, mobility, lifting, ambulation and eccentric single leg control. [] UE / cervical: cervical, scapular, scapulothoracic and UE control with self care, reaching, carrying, lifting, house/yardwork, driving, computer work.      NMR and Therapeutic Activities:    [x] (01709 or / Oscillations-Mobs:  G-I, II, III, IV (PA's, Inf., Post.)  [] (89931) Provided manual therapy to mobilize LE, proximal hip and/or LS spine soft tissue/joints for the purpose of modulating pain, promoting relaxation,  increasing ROM, reducing/eliminating soft tissue swelling/inflammation/restriction, improving soft tissue extensibility and allowing for proper ROM for normal function with   [] LE / lumbar: self care, mobility, lifting and ambulation. [] UE / Cervical: self care, reaching, carrying, lifting, house/yardwork, driving, computer work. Modalities:  [] (59415) Vasopneumatic compression: Utilized vasopneumatic compression to decrease edema / swelling for the purpose of improving mobility and quad tone / recruitment which will allow for increased overall function including but not limited to self-care, transfers, ambulation, and ascending / descending stairs. Charges:  Timed Code Treatment Minutes: 40   Total Treatment Minutes: 40     [] EVAL - LOW (77562)   [] EVAL - MOD (96416)  [] EVAL - HIGH (34130)  [] RE-EVAL (54622)  [x] BT(59287) x  1     [] Ionto  [] NMR (38107) x       [] Vaso  [] Manual (64267) x        [] Ultrasound  [x] TA x 2                [] Mech Traction (38691)  [] Aquatic Therapy x      [] ES (un) (83008):   [] Home Management Training x  [] ES(attended) (57028)   [] Dry Needling 1-2 muscles (82907):  [] Dry Needling 3+ muscles (138361)  [] Group:      [] Other:     Overall Progression Towards Functional goals/ Treatment Progress Update:  [] Patient is progressing as expected towards functional goals listed. [] Progression is slowed due to complexities/Impairments listed. [] Progression has been slowed due to co-morbidities.   [] Plan just implemented, too soon to assess goals progression <30days   [] Goals require adjustment due to lack of progress  [] Patient is not progressing as expected and requires additional follow up with physician  [] Other    Persisting Functional Limitations/Impairments:  [x]Sleeping []Sitting               [x]Standing [x]Transfers        [x]Walking []Kneeling               [x]Stairs [x]Squatting / bending   [x]ADLs [x]Reaching  [x]Lifting  [x]Housework  []Driving []Job related tasks  []Sports/Recreation []Other:      ASSESSMENT: SEE DC ABOVE     Treatment/Activity Tolerance:  [x] Patient able to complete tx  [] Patient limited by fatigue  [] Patient limited by pain  [] Patient limited by other medical complications  [] Other:     Prognosis: [x] Good [x] Fair  [] Poor    Patient Requires Follow-up: [] Yes  [x] No    Plan for next treatment session:     PLAN: See diana. PT 2x / week for 4 weeks. [x] Continue per plan of care [] Alter current plan (see comments)  [] Plan of care initiated [] Hold pending MD visit [x] Discharge    Electronically signed by: Komal Magallanes PT, DPT    Note: If patient does not return for scheduled/ recommended follow up visits, this note will serve as a discharge from care along with most recent update on progress.

## 2022-01-12 ENCOUNTER — HOSPITAL ENCOUNTER (OUTPATIENT)
Dept: CT IMAGING | Age: 72
Discharge: HOME | End: 2022-01-12
Payer: MEDICARE

## 2022-01-12 DIAGNOSIS — C34.91 METASTATIC PRIMARY LUNG CANCER, RIGHT (HCC): ICD-10-CM

## 2022-01-12 PROCEDURE — 6360000004 HC RX CONTRAST MEDICATION: Performed by: STUDENT IN AN ORGANIZED HEALTH CARE EDUCATION/TRAINING PROGRAM

## 2022-01-12 PROCEDURE — 74177 CT ABD & PELVIS W/CONTRAST: CPT

## 2022-01-12 RX ADMIN — IOHEXOL 50 ML: 240 INJECTION, SOLUTION INTRATHECAL; INTRAVASCULAR; INTRAVENOUS; ORAL at 16:39

## 2022-01-12 RX ADMIN — IOPAMIDOL 75 ML: 755 INJECTION, SOLUTION INTRAVENOUS at 16:39

## 2022-02-08 ENCOUNTER — TELEPHONE (OUTPATIENT)
Dept: INTERNAL MEDICINE CLINIC | Age: 72
End: 2022-02-08

## 2022-02-08 NOTE — TELEPHONE ENCOUNTER
Patient is not sure who she needs to see. She is having a lot of pain and just really is tired of taking the medication and nothing helps. She is seeing dr. Wolfgang Fernández. She has had 2 nerve blocks and an ablasion. She has DDD. She was sent to the [facility]. She is taking oxycodone and other meds but nothing is helping her pain. She is able to do basic things around the house but just doesn't feel like she is living. She is not in a hurry and has been dealing with this for some time. Ok to wait for Dr. Roland Mcdonald patient aware he is out of the office and may not address until next week.

## 2022-02-08 NOTE — TELEPHONE ENCOUNTER
----- Message from Vincent Jorge sent at 2/8/2022 10:55 AM EST -----  Subject: Message to Provider    QUESTIONS  Information for Provider? Pt is wanting to see if Dr. Duaine Duane can provide   a list of preferred arthritis specialists in her area. ---------------------------------------------------------------------------  --------------  Pascual ROWLAND  What is the best way for the office to contact you? OK to leave message on   voicemail, OK to respond with electronic message via [email or web address] portal (only   for patients who have registered [email or web address] account)  Preferred Call Back Phone Number? [phone]  ---------------------------------------------------------------------------  --------------  SCRIPT ANSWERS  Relationship to Patient?  Self

## 2022-02-09 NOTE — TELEPHONE ENCOUNTER
I think it would be best for her to come in for an appointment so we can discuss the next steps to help her. Please offer her an appointment. If she wants to get in quickly, we can also add her if someone cancels.

## 2022-02-15 ENCOUNTER — OFFICE VISIT (OUTPATIENT)
Dept: INTERNAL MEDICINE CLINIC | Age: 72
End: 2022-02-15
Payer: MEDICARE

## 2022-02-15 VITALS
WEIGHT: 112.8 LBS | HEIGHT: 68 IN | SYSTOLIC BLOOD PRESSURE: 136 MMHG | BODY MASS INDEX: 17.1 KG/M2 | DIASTOLIC BLOOD PRESSURE: 80 MMHG | HEART RATE: 92 BPM

## 2022-02-15 DIAGNOSIS — G89.29 CHRONIC RIGHT-SIDED THORACIC BACK PAIN: Primary | ICD-10-CM

## 2022-02-15 DIAGNOSIS — M54.6 CHRONIC RIGHT-SIDED THORACIC BACK PAIN: Primary | ICD-10-CM

## 2022-02-15 DIAGNOSIS — C34.91 METASTATIC PRIMARY LUNG CANCER, RIGHT (HCC): ICD-10-CM

## 2022-02-15 PROCEDURE — 3017F COLORECTAL CA SCREEN DOC REV: CPT | Performed by: INTERNAL MEDICINE

## 2022-02-15 PROCEDURE — 1123F ACP DISCUSS/DSCN MKR DOCD: CPT | Performed by: INTERNAL MEDICINE

## 2022-02-15 PROCEDURE — 1090F PRES/ABSN URINE INCON ASSESS: CPT | Performed by: INTERNAL MEDICINE

## 2022-02-15 PROCEDURE — G8419 CALC BMI OUT NRM PARAM NOF/U: HCPCS | Performed by: INTERNAL MEDICINE

## 2022-02-15 PROCEDURE — 4040F PNEUMOC VAC/ADMIN/RCVD: CPT | Performed by: INTERNAL MEDICINE

## 2022-02-15 PROCEDURE — 99213 OFFICE O/P EST LOW 20 MIN: CPT | Performed by: INTERNAL MEDICINE

## 2022-02-15 PROCEDURE — G8427 DOCREV CUR MEDS BY ELIG CLIN: HCPCS | Performed by: INTERNAL MEDICINE

## 2022-02-15 PROCEDURE — G8399 PT W/DXA RESULTS DOCUMENT: HCPCS | Performed by: INTERNAL MEDICINE

## 2022-02-15 PROCEDURE — G8484 FLU IMMUNIZE NO ADMIN: HCPCS | Performed by: INTERNAL MEDICINE

## 2022-02-15 PROCEDURE — 1036F TOBACCO NON-USER: CPT | Performed by: INTERNAL MEDICINE

## 2022-02-15 RX ORDER — MORPHINE SULFATE 30 MG/1
30 TABLET, FILM COATED, EXTENDED RELEASE ORAL 3 TIMES DAILY
COMMUNITY
Start: 2022-01-23

## 2022-02-15 RX ORDER — DULOXETIN HYDROCHLORIDE 30 MG/1
60 CAPSULE, DELAYED RELEASE ORAL DAILY
Qty: 60 CAPSULE | Refills: 0 | Status: SHIPPED | OUTPATIENT
Start: 2022-02-15 | End: 2022-03-24 | Stop reason: SDUPTHER

## 2022-02-15 NOTE — PROGRESS NOTES
Chief Complaint   Patient presents with    Back Pain     DDD at T 7,8,9. Would like to discuss other options than pain med. HPI:  The patient is presenting with back pain. Location: right thoracic paraspinous area, radiating to the right lower chest  Quality: it feels like her ribs are being pushed into her torso and then pulled out   Onset: About 18 months ago- there were no known instigating factors  Radiation: none   Aggravating factors: twisting her upper body  Alleviating factors :holding still helps some; warm shower, heat and Aspercreme provide some relief  Severity: at rest it is 6/10; pain is limiting her ability to participate in pleasurable activities    She is currently taking MS contin 30mg BID oxydone 7.5mg 4 times daily, gabapentin 300mg three times daily. She does not get significant relief with these medications. She had a nerve ablation in July and it is difficult for her to say how helpful it was. She is using Miralax to keep her bowels moving. Exam  Tenderness upon palpation over the right lower rib cage, posteriorly    CT chest/abd/pelvis done 1/12/22 showed stable metastatic disease      A/P    1. Chronic right-sided thoracic back pain  Chronic pain syndrome without clear etiology. There is a possibility of postherpetic neuralgia although acute zoster was not diagnosed prior to pain onset. Her malignancy might be playing a role as well. We discussed goals of care including decreasing pain to the extent that she is able to participate in daily activities in pleasurable activities. We will try transitioning her to Cymbalta and weaning from citalopram.  She is agreeable to seeing Dr. Jacqueline Lamar as well.   Ultimately she would like to get off of the opioids if possible, but understands the need to continue at this time as well as the need to taper off of the medication to prevent withdrawal.  Consider referral for medicinal marijuana if the above interventions are not effective. - DULoxetine (CYMBALTA) 30 MG extended release capsule; Take 2 capsules by mouth daily  Dispense: 60 capsule; Refill: 0  - Ambulatory referral to Psychology    2. Metastatic primary lung cancer, right ([address]  Recent CT scan showed no progression of disease. She is followed by oncology. As noted above, this may be contributing to her pain. - DULoxetine (CYMBALTA) 30 MG extended release capsule; Take 2 capsules by mouth daily  Dispense: 60 capsule;  Refill: 0

## 2022-02-15 NOTE — PATIENT INSTRUCTIONS
Begin Cymbalta 30mg daily for two weeks and citalopram 20mg daily for two weeks.   After two weeks, begin Cymbalta 60mg daily and discontinue citalopram.

## 2022-03-10 ENCOUNTER — OFFICE VISIT (OUTPATIENT)
Dept: PSYCHOLOGY | Age: 72
End: 2022-03-10
Payer: MEDICARE

## 2022-03-10 DIAGNOSIS — F41.1 GAD (GENERALIZED ANXIETY DISORDER): ICD-10-CM

## 2022-03-10 DIAGNOSIS — F33.1 MODERATE EPISODE OF RECURRENT MAJOR DEPRESSIVE DISORDER (HCC): Primary | ICD-10-CM

## 2022-03-10 PROCEDURE — 90791 PSYCH DIAGNOSTIC EVALUATION: CPT | Performed by: PSYCHOLOGIST

## 2022-03-10 PROCEDURE — 1036F TOBACCO NON-USER: CPT | Performed by: PSYCHOLOGIST

## 2022-03-10 NOTE — PROGRESS NOTES
Behavioral Health Consultation  Andrew Waddell, Ph.D.  Clinical Psychologist  3/10/2022  11:35 AM      Time spent with Patient: 27 minutes  This is patient's first [facility] appointment. Reason for Consult:    Chief Complaint   Patient presents with    Anxiety       Pt provided informed consent for the behavioral health program. Discussed with patient model of service to include the limits of confidentiality (i.e. abuse reporting, suicide intervention, etc.) and short-term intervention focused approach. Pt indicated understanding. Feedback given to PCP. S:  Pt seen per PCP re:anxiety and depression. Patient reported depressive symptoms, including depressed mood, deactivation, anhedonia, poor self-worth, social isolation (has one very good friend in [facility] [place], a few other good friends, 3 sisters who live out-of-state (talks to 2 of them often)), low appetite, insomnia/hypersomnia, fatigue, and poor concentration/focus. Pt reported symptoms of anxiety, including anxious, racing, uncontrollable thoughts, muscle tension,, fatigue, irritability, and poor concentration/focus. Pt also reported GI distress and variable appetite. Pt was last seen in 2018 for anxiety. Dx'ed w cancer in 2019, has been through several txs (in remission \"basically\"). \"I'm miserable. \" pain, low appetite, fatigue, low motivation. \"I can't even work a puzzle. \" Reported she is in bed 16-18 hrs/day, sleeping 8-10 hrs/day, mostly in 2 hour segments. 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 Now only way to get relief is taking a warm shower. Her longterm partner of 35 yrs w Parkinson's was coming over 3x/week, now she cannot help him manage his sxs and he's no longer allowed to drive; they continue to talk twice a day on the phone.      O:  MSE:    Appearance    alert, cooperative  Impulsive behavior No  Speech    spontaneous, normal rate, normal volume and well articulated  Mood    Anxious  Depressed  Affect    depressed affect  Thought Content    intact, cognitive distortions and all or nothing thinking  Thought Process    linear, goal directed and coherent  Associations    logical connections  Insight    Fair  Judgment    Intact  Orientation    oriented to person, place, time, and general circumstances  Memory    recent and remote memory intact  Attention/Concentration    impaired  Morbid ideation No  Suicide Assessment    no suicidal ideation    History:    Social History:   Social History     Socioeconomic History    Marital status:      Spouse name: Not on file    Number of children: Not on file    Years of education: Not on file    Highest education level: Not on file   Occupational History    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   Tobacco Use    Smoking status: Former Smoker     Packs/day: 1.50     Years: 40.00     Pack years: 60.00     Types: Cigarettes     Quit date: 2007     Years since quittin.7    Smokeless tobacco: Never Used   Vaping Use    Vaping Use: Never used   Substance and Sexual Activity    Alcohol use: No     Comment: none     Drug use: No    Sexual activity: Yes     Partners: Male     Comment: , currently in relationship with long term boyfriend   Other Topics Concern    Not on file   Social History Narrative    Not on file     Social Determinants of Health     Financial Resource Strain: Low Risk     Difficulty of Paying Living Expenses: Not hard at all   Food Insecurity: No Food Insecurity    Worried About [address] [facility] in the Last Year: Never true    [address] in the Last Year: Never true   Transportation Needs:     Lack of Transportation (Medical): Not on file    Lack of Transportation (Non-Medical):  Not on file   Physical Activity:     Days of Exercise per Week: Not on file    Minutes of Exercise per Session: Not on file   Stress:     Feeling of Stress : Not on file   Social Connections:     Frequency of Communication with Friends and Family: Not on file  Frequency of Social Gatherings with Friends and Family: Not on file    Attends Roman Catholic Services: Not on file    Active Member of Clubs or Organizations: Not on file    Attends Club or Organization Meetings: Not on file    Marital Status: Not on file   Intimate Partner Violence:     Fear of Current or Ex-Partner: Not on file    Emotionally Abused: Not on file    Physically Abused: Not on file    Sexually Abused: Not on file   Housing Stability:     Unable to Pay for Housing in the Last Year: Not on file    Number of [place] in the Last Year: Not on file    Unstable Housing in the Last Year: Not on file     TOBACCO:   reports that she quit smoking about 14 years ago. Her smoking use included cigarettes. She has a 60.00 pack-year smoking history. She has never used smokeless tobacco.  ETOH:   reports no history of alcohol use. Diagnosis:  Major depressive disorder; recurrent and moderate  Generalized anxiety disorder    Plan:  Pt interventions:  Established rapport, Conducted functional assessment, [place]-setting to identify pt's primary goals for [facility] visit / overall health and Supportive techniques, book recommendation    Documentation was done using voice recognition dragon software. Every effort was made to ensure accuracy; however, inadvertent, unintentional computerized transcription errors may be present.

## 2022-03-15 ENCOUNTER — OFFICE VISIT (OUTPATIENT)
Dept: INTERNAL MEDICINE CLINIC | Age: 72
End: 2022-03-15
Payer: MEDICARE

## 2022-03-15 VITALS
WEIGHT: 106 LBS | HEART RATE: 88 BPM | BODY MASS INDEX: 16.06 KG/M2 | HEIGHT: 68 IN | SYSTOLIC BLOOD PRESSURE: 132 MMHG | DIASTOLIC BLOOD PRESSURE: 68 MMHG

## 2022-03-15 DIAGNOSIS — G89.4 PAIN SYNDROME, CHRONIC: Primary | ICD-10-CM

## 2022-03-15 PROCEDURE — G8399 PT W/DXA RESULTS DOCUMENT: HCPCS | Performed by: INTERNAL MEDICINE

## 2022-03-15 PROCEDURE — G8484 FLU IMMUNIZE NO ADMIN: HCPCS | Performed by: INTERNAL MEDICINE

## 2022-03-15 PROCEDURE — G8427 DOCREV CUR MEDS BY ELIG CLIN: HCPCS | Performed by: INTERNAL MEDICINE

## 2022-03-15 PROCEDURE — 3017F COLORECTAL CA SCREEN DOC REV: CPT | Performed by: INTERNAL MEDICINE

## 2022-03-15 PROCEDURE — G8419 CALC BMI OUT NRM PARAM NOF/U: HCPCS | Performed by: INTERNAL MEDICINE

## 2022-03-15 PROCEDURE — 1090F PRES/ABSN URINE INCON ASSESS: CPT | Performed by: INTERNAL MEDICINE

## 2022-03-15 PROCEDURE — 99213 OFFICE O/P EST LOW 20 MIN: CPT | Performed by: INTERNAL MEDICINE

## 2022-03-15 PROCEDURE — 1123F ACP DISCUSS/DSCN MKR DOCD: CPT | Performed by: INTERNAL MEDICINE

## 2022-03-15 PROCEDURE — 4040F PNEUMOC VAC/ADMIN/RCVD: CPT | Performed by: INTERNAL MEDICINE

## 2022-03-15 PROCEDURE — 1036F TOBACCO NON-USER: CPT | Performed by: INTERNAL MEDICINE

## 2022-03-15 NOTE — PROGRESS NOTES
Chief Complaint   Patient presents with    Back Pain     chronic - response to duloxetine. she believes it does help with her pain. HPI:  Chronic right sided thoracic back pain. Since starting Cymbalta 60mg daily she reports improved pain. She continues to have some pain. She sees Dr. Viviane Rg for MS Contin, oxycodone and gabapentin. She is not sure if this helps. She also saw Dr. Adriana Boyle last week. Chronic constipation. EXAM  /68   Pulse 88   Ht 5' 8\" (1.727 m)   Wt 106 lb (48.1 kg)   BMI 16.12 kg/m²    GEN: WN/WD  GI: soft non distended   mildly tender to palpation    A/P     Diagnosis Orders   1. Pain syndrome, chronic     She has chronic pain in a dermatomal distribution most consistent with postherpetic neuralgia. She has seen some improvement since increasing Cymbalta to 60 mg daily. She will continue to follow-up with behavioral health as well. She is receiving opioid prescriptions from Dr. Viviane Rg. She is interested in learning about other modalities that might help with her pain. I will contact him to discuss her case.   Continue Cymbalta and follow up with Dr. Adriana Boyle

## 2022-03-24 ENCOUNTER — HOSPITAL ENCOUNTER (OUTPATIENT)
Dept: MRI IMAGING | Age: 72
Discharge: HOME | End: 2022-03-24
Payer: MEDICARE

## 2022-03-24 ENCOUNTER — TELEPHONE (OUTPATIENT)
Dept: INTERNAL MEDICINE CLINIC | Age: 72
End: 2022-03-24

## 2022-03-24 ENCOUNTER — HOSPITAL ENCOUNTER (OUTPATIENT)
Age: 72
Discharge: HOME | End: 2022-03-24
Payer: MEDICARE

## 2022-03-24 DIAGNOSIS — D35.3 BENIGN NEOPLASM OF PITUITARY GLAND AND CRANIOPHARYNGEAL DUCT (POUCH) (HCC): ICD-10-CM

## 2022-03-24 DIAGNOSIS — G89.29 CHRONIC RIGHT-SIDED THORACIC BACK PAIN: ICD-10-CM

## 2022-03-24 DIAGNOSIS — M54.6 CHRONIC RIGHT-SIDED THORACIC BACK PAIN: ICD-10-CM

## 2022-03-24 DIAGNOSIS — I72.9 ANEURYSM OF UNSPECIFIED SITE (HCC): ICD-10-CM

## 2022-03-24 DIAGNOSIS — C34.91 METASTATIC PRIMARY LUNG CANCER, RIGHT (HCC): ICD-10-CM

## 2022-03-24 DIAGNOSIS — D35.2 BENIGN NEOPLASM OF PITUITARY GLAND AND CRANIOPHARYNGEAL DUCT (POUCH) (HCC): ICD-10-CM

## 2022-03-24 LAB
CREAT SERPL-MCNC: 0.5 MG/DL (ref 0.6–1.2)
GFR AFRICAN AMERICAN: >60
GFR NON-AFRICAN AMERICAN: >60

## 2022-03-24 PROCEDURE — A9577 INJ MULTIHANCE: HCPCS | Performed by: NEUROLOGICAL SURGERY

## 2022-03-24 PROCEDURE — 6360000004 HC RX CONTRAST MEDICATION: Performed by: NEUROLOGICAL SURGERY

## 2022-03-24 PROCEDURE — 70553 MRI BRAIN STEM W/O & W/DYE: CPT

## 2022-03-24 PROCEDURE — 36415 COLL VENOUS BLD VENIPUNCTURE: CPT

## 2022-03-24 PROCEDURE — 82565 ASSAY OF CREATININE: CPT

## 2022-03-24 RX ORDER — DULOXETIN HYDROCHLORIDE 30 MG/1
60 CAPSULE, DELAYED RELEASE ORAL DAILY
Qty: 60 CAPSULE | Refills: 3 | Status: SHIPPED | OUTPATIENT
Start: 2022-03-24

## 2022-03-24 RX ADMIN — GADOBENATE DIMEGLUMINE 10 ML: 529 INJECTION, SOLUTION INTRAVENOUS at 11:46

## 2022-03-24 NOTE — TELEPHONE ENCOUNTER
Pt calling requesting refill of Duloxetine     Last written 2/15/22  Last OV 3/15/22  Next OV 5/23/22  Last recommended OV NA    Please send to [facility]

## 2022-03-31 PROBLEM — F33.1 MODERATE EPISODE OF RECURRENT MAJOR DEPRESSIVE DISORDER (HCC): Status: ACTIVE | Noted: 2022-03-31

## 2022-03-31 PROBLEM — F41.1 GAD (GENERALIZED ANXIETY DISORDER): Status: ACTIVE | Noted: 2022-03-31

## 2022-04-07 ENCOUNTER — OFFICE VISIT (OUTPATIENT)
Dept: PSYCHOLOGY | Age: 72
End: 2022-04-07
Payer: MEDICARE

## 2022-04-07 DIAGNOSIS — F41.1 GAD (GENERALIZED ANXIETY DISORDER): ICD-10-CM

## 2022-04-07 DIAGNOSIS — F33.1 MODERATE EPISODE OF RECURRENT MAJOR DEPRESSIVE DISORDER (HCC): Primary | ICD-10-CM

## 2022-04-07 PROCEDURE — 1036F TOBACCO NON-USER: CPT | Performed by: PSYCHOLOGIST

## 2022-04-07 PROCEDURE — 90832 PSYTX W PT 30 MINUTES: CPT | Performed by: PSYCHOLOGIST

## 2022-04-07 NOTE — PROGRESS NOTES
Behavioral Health Consultation  Ileana Lisa, Ph.D.  Psychologist  4/7/2022  10:09 AM      Time spent with Patient: 23 minutes  This is patient's second  [facility] appointment. Reason for Consult:    Chief Complaint   Patient presents with    Depression       Feedback given to PCP. S:  Pt seen for f/u of depression and anxiety, chronic pain. Pt reported unchanged mood and sxs. Got med marijuana card and went to dispensary on Monday. Bought lotion, drops, and gummies. Reported she gets only a small change in pain level. Cited current pain level at a 6, this is about her average. Considering moving to [facility] w sister bc she is worried about not being able to care for self soon. Psychoed on gate theory. Discussed exacerbating and relieving factors. Feeling disheartened by the process. Enjoyed sitting in the sun on her deck 1 day, felt relaxed and pain felt somewhat better.      O:  MSE:    Appearance    alert, cooperative  Appetite abnormal: low  Sleep disturbance Yes  Fatigue Yes  Loss of pleasure Yes  Impulsive behavior No  Speech    spontaneous, normal rate, normal volume and well articulated  Mood    Depressed  Affect    depressed affect  Thought Content    intact and cognitive distortions  Thought Process    linear, goal directed and coherent  Associations    logical connections  Insight    Fair  Judgment    Intact  Orientation    oriented to person, place, time, and general circumstances  Memory    recent and remote memory intact  Attention/Concentration    impaired  Morbid ideation No  Suicide Assessment    no suicidal ideation    History:  Social History:   Social History     Socioeconomic History    Marital status:      Spouse name: Not on file    Number of children: Not on file    Years of education: Not on file    Highest education level: Not on file   Occupational History    Occupation: accounts payable   Tobacco Use    Smoking status: Former Smoker     Packs/day: 1.50     Years: 40.00 Pack years: 60.00     Types: Cigarettes     Quit date: 2007     Years since quittin.8    Smokeless tobacco: Never Used   Vaping Use    Vaping Use: Never used   Substance and Sexual Activity    Alcohol use: No     Comment: none     Drug use: No    Sexual activity: Yes     Partners: Male     Comment: , currently in relationship with long term boyfriend   Other Topics Concern    Not on file   Social History Narrative    Not on file     Social Determinants of Health     Financial Resource Strain: Low Risk     Difficulty of Paying Living Expenses: Not hard at all   Food Insecurity: No Food Insecurity    Worried About Running Out of Food in the Last Year: Never true    [place] of Food in the Last Year: Never true   Transportation Needs:     Lack of Transportation (Medical): Not on file    Lack of Transportation (Non-Medical): Not on file   Physical Activity:     Days of Exercise per Week: Not on file    Minutes of Exercise per Session: Not on file   Stress:     Feeling of Stress : Not on file   Social Connections:     Frequency of Communication with Friends and Family: Not on file    Frequency of Social Gatherings with Friends and Family: Not on file    Attends Lutheran Services: Not on file    Active Member of [address] [place] [address] [employer] or Organizations: Not on file    Attends Club or Organization Meetings: Not on file    Marital Status: Not on file   Intimate Partner Violence:     Fear of Current or Ex-Partner: Not on file    Emotionally Abused: Not on file    Physically Abused: Not on file    Sexually Abused: Not on file   Housing Stability:     Unable to Pay for Housing in the Last Year: Not on file    Number of [place] in the Last Year: Not on file    Unstable Housing in the Last Year: Not on file     TOBACCO:   reports that she quit smoking about 14 years ago. Her smoking use included cigarettes. She has a 60.00 pack-year smoking history.  She has never used smokeless tobacco.  ETOH: reports no history of alcohol use. Diagnosis:  1. Moderate episode of recurrent major depressive disorder ([facility] [address]    2. TORIN (generalized anxiety disorder)          Plan:  Pt interventions:  Provided Psychoeducation re: gate theory, pain relieving factors and Identified maladaptive thoughts        Documentation was done using voice recognition dragon software. Every effort was made to ensure accuracy; however, inadvertent, unintentional computerized transcription errors may be present.

## 2022-04-18 ENCOUNTER — HOSPITAL ENCOUNTER (OUTPATIENT)
Dept: CT IMAGING | Age: 72
Discharge: HOME | End: 2022-04-18
Payer: MEDICARE

## 2022-04-18 DIAGNOSIS — C34.11 MALIGNANT NEOPLASM OF UPPER LOBE OF RIGHT LUNG (HCC): ICD-10-CM

## 2022-04-18 PROCEDURE — 6360000004 HC RX CONTRAST MEDICATION: Performed by: STUDENT IN AN ORGANIZED HEALTH CARE EDUCATION/TRAINING PROGRAM

## 2022-04-18 PROCEDURE — 74177 CT ABD & PELVIS W/CONTRAST: CPT

## 2022-04-18 RX ADMIN — IOPAMIDOL 75 ML: 755 INJECTION, SOLUTION INTRAVENOUS at 12:14

## 2022-04-18 RX ADMIN — IOHEXOL 50 ML: 240 INJECTION, SOLUTION INTRATHECAL; INTRAVASCULAR; INTRAVENOUS; ORAL at 12:13

## 2022-04-27 ENCOUNTER — OFFICE VISIT (OUTPATIENT)
Dept: PULMONOLOGY | Age: 72
End: 2022-04-27
Payer: MEDICARE

## 2022-04-27 VITALS
WEIGHT: 101.8 LBS | DIASTOLIC BLOOD PRESSURE: 80 MMHG | OXYGEN SATURATION: 97 % | SYSTOLIC BLOOD PRESSURE: 132 MMHG | HEIGHT: 68 IN | HEART RATE: 112 BPM | BODY MASS INDEX: 15.43 KG/M2

## 2022-04-27 DIAGNOSIS — J90 PLEURAL EFFUSION ON RIGHT: ICD-10-CM

## 2022-04-27 DIAGNOSIS — J90 PLEURAL EFFUSION ON RIGHT: Primary | ICD-10-CM

## 2022-04-27 DIAGNOSIS — C34.91 METASTATIC PRIMARY LUNG CANCER, RIGHT (HCC): ICD-10-CM

## 2022-04-27 DIAGNOSIS — R91.1 LUNG NODULE: Primary | ICD-10-CM

## 2022-04-27 PROCEDURE — 1123F ACP DISCUSS/DSCN MKR DOCD: CPT | Performed by: INTERNAL MEDICINE

## 2022-04-27 PROCEDURE — G8427 DOCREV CUR MEDS BY ELIG CLIN: HCPCS | Performed by: INTERNAL MEDICINE

## 2022-04-27 PROCEDURE — 3017F COLORECTAL CA SCREEN DOC REV: CPT | Performed by: INTERNAL MEDICINE

## 2022-04-27 PROCEDURE — 99214 OFFICE O/P EST MOD 30 MIN: CPT | Performed by: INTERNAL MEDICINE

## 2022-04-27 PROCEDURE — G8399 PT W/DXA RESULTS DOCUMENT: HCPCS | Performed by: INTERNAL MEDICINE

## 2022-04-27 PROCEDURE — G8419 CALC BMI OUT NRM PARAM NOF/U: HCPCS | Performed by: INTERNAL MEDICINE

## 2022-04-27 PROCEDURE — 1036F TOBACCO NON-USER: CPT | Performed by: INTERNAL MEDICINE

## 2022-04-27 PROCEDURE — 4040F PNEUMOC VAC/ADMIN/RCVD: CPT | Performed by: INTERNAL MEDICINE

## 2022-04-27 PROCEDURE — 1090F PRES/ABSN URINE INCON ASSESS: CPT | Performed by: INTERNAL MEDICINE

## 2022-04-27 RX ORDER — PEDI NUTRITION,IRON,LACT-FREE 0.06 G-1.5
LIQUID (ML) ORAL
COMMUNITY

## 2022-04-27 NOTE — PROGRESS NOTES
PULMONARY OFFICE FOLLOW UP NOTE    REASON FOR VISIT:   Chief Complaint   Patient presents with    Follow-up       DATE OF VISIT: 4/27/2022     HISTORY OF PRESENT ILLNESS: 66y.o. year old female was diagnosed to have T2N2 non-small cell lung cancer which has  metastasized comes in to the pulmonary office for follow-up. Patient had a repeat CT chest done this year which showed new pulm nodules in the right lower lobe. Associated there is a right pleural effusion. Patient comes in to discuss diagnostic work-up. Reports that her breathing has been stable. She has been feeling fatigued and tired. Has been losing weight as well as appetite. Reports that she continues to have pain in her mid back and right-sided chest.  The pain happens all the time. Does not increase with breathing or deep coughing. Remains on room air. Previously: Patient has been undergoing immunotherapy due to metastatic disease. She has responded well to this therapy. Patient had  right-sided pneumothorax in April 2021. This did not require any intervention and spontaneously resolved. Currently her breathing is stable. Continues to report right-sided rib pain which is disturbing her sleep. She is using lidocaine patches and oxycodone to relieve the pain. Is frustrated that she is not able to get any relief. Planning to go to pain control center again. She did report right-sided chest pain which was more neuropathic in characteristics and she underwent nerve block 2 weeks ago. For the last 2 weeks she has been having some increased shortness of breath and right-sided chest heaviness. Symptoms are minimal and have not impacted her day-to-day functioning. This morning she had a surveillance CT chest done which showed right-sided pneumothorax. Used to smoke more than 1 pack/day for 40 years before quitting 20 years ago. REVIEW OF SYSTEMS: 14 point ROS is negative beside mentioned in [address]        PAST MEDICAL HISTORY: Pressure Father     Parkinsonism Father     Colon Cancer Sister 61       MEDICATIONS:     Current Outpatient Medications on File Prior to Visit   Medication Sig Dispense Refill    Nutritional Supplements ([address]) LIQD Take by mouth      DULoxetine (CYMBALTA) 30 MG extended release capsule Take 2 capsules by mouth daily 60 capsule 3    morphine (MS CONTIN) 30 MG extended release tablet 30 mg 2 times daily.  atorvastatin (LIPITOR) 20 MG tablet TAKE ONE TABLET BY MOUTH DAILY 90 tablet 3    citalopram (CELEXA) 20 MG tablet Take 1.5 tablets by mouth daily 135 tablet 1    propranolol (INDERAL) 20 MG tablet TAKE ONE TABLET BY MOUTH THREE TIMES A  tablet 3    oxyCODONE-acetaminophen (PERCOCET) 7.5-325 MG per tablet Take 1 tablet by mouth every 6 hours as needed for Pain.  polyethylene glycol (GLYCOLAX) 17 g packet Take 17 g by mouth daily as needed for Constipation      gabapentin (NEURONTIN) 300 MG capsule Take 300 mg by mouth 3 times daily.  calcium carbonate (OSCAL) 500 MG TABS tablet Take 500 mg by mouth daily      Multiple Vitamins-Minerals (THERAPEUTIC MULTIVITAMIN-MINERALS) tablet Take 1 tablet by mouth daily      vitamin C (ASCORBIC ACID) 500 MG tablet Take 1,000 mg by mouth daily       No current facility-administered medications on file prior to visit. ALLERGIES:   Allergies as of 04/27/2022 - Fully Reviewed 04/27/2022   Allergen Reaction Noted    Nsaids  06/06/2014      OBJECTIVE:   height is 5' 8\" (1.727 m) and weight is 101 lb 12.8 oz (46.2 kg). Her blood pressure is 132/80 and her pulse is 112. Her oxygen saturation is 97%. PHYSICAL EXAM:    CONSTITUTIONAL: She is a 66y.o.-year-old who appears her stated age. She is alert and oriented x 3 and in no acute distress. HEENT: PERRL. No scleral icterus. No thrush, atraumatic, normocephalic. NECK: Supple, without cervical or supraclavicular lymphadenopathy:  CARDIOVASCULAR: S1 S2 RRR. Without murmer  RESPIRATORY & CHEST: Slightly decreased breath sounds heard in the right side. No wheezing or crackles heard. GASTROINTESTINAL & ABDOMEN: Soft, nontender, positive bowel sounds in all quadrants, non-distended, without hepatosplenomegaly. GENITOURINARY: Deferred. MUSCULOSKELETAL: No tenderness to palpation of the axial skeleton. There is no clubbing. No cyanosis. No edema of the lower extremities. SKIN OF BODY: No rash or jaundice. PSYCHIATRIC EVALUATION: Normal affect. Patient answers questions appropriately. HEMATOLOGIC/LYMPHATIC/ IMMUNOLOGIC: No palpable lymphadenopathy. NEUROLOGIC: Alert and oriented x 3. [place] non-focal. Motor strength is 5+/5 in all muscle groups. The patient has a normal sensorium globally. Labs:    Lab Summary Latest Ref Rng & Units 3/24/2022 11/22/2021 9/29/2021   Sodium 136 - 145 mmol/L - 140 -   Potassium 3.5 - 5.1 mmol/L - 3.8 -   BUN 7 - 20 mg/dL - 10 -   Creatinine 0.6 - 1.2 mg/dL 0.5(L) <0.5(L) 0.5(L)   Glucose 70 - 99 mg/dL - 102(H) -   Calcium 8.3 - 10.6 mg/dL - 9.7 -   Phosphorus 2.5 - 4.9 mg/dL - 4.2 -   Albumin 3.4 - 5.0 g/dL - 4.4 -   HDL cholesterol 40 - 60 mg/dL - 54 -   Triglycerides 0 - 150 mg/dL - 119 -   LDL calc <100 mg/dL - 73 -   VLDL calc Not Established mg/dL - 24 -   Some recent data might be hidden         IMAGING:    Narrative   EXAMINATION:   CT OF THE CHEST, ABDOMEN, AND PELVIS WITH CONTRAST 4/18/2022 12:08 pm           FINDINGS:       Chest:       Mediastinum: Stable mediastinum without any adenopathy.  Mild cardiomegaly.    Normal size thoracic aorta with mild atherosclerotic changes.  No new   adenopathy in the mediastinum.       Lungs/pleura: Stable triangular atelectasis with bronchiectatic changes noted   extending from the right hilum to the right upper lung stable   mild-to-moderate emphysema.  Stable ill-defined ground-glass opacity left   upper lobe.  6 mm ground-glass nodule in the posterior left upper lobe of the lung is stable.       New 6 mm nodule right lower lobe of the lung is new from April 2021   comparison, image 24 series 3.  New 1 cm nodule in the right lung base, image   70 series 3.  4 mm nodule in the right lower lobe of the lung, image 46   series 3 is new.       New small to moderate right pleural effusion.       Soft Tissues/Bones: Significant osteopenic changes and degenerative changes   noted in the bony structures.  Midthoracic vertebral body chronic compression   fracture with 10-15% loss of vertebral body height.  No new finding.  Large   joint effusion left shoulder           Abdomen/Pelvis:       Organs: Multiple hepatic cyst are stable.  Stable 7 mm subtle lesion in the   inferior right hepatic lobe of the liver.  No new findings in the liver. Portal venous system is patent.  Normal gallbladder, pancreas, spleen and   adrenals.       No nephrolithiasis or hydronephrosis noted.       GI/Bowel: The stomach, small bowel and colon are stable without acute   finding.  Sigmoid diverticular changes present.       Pelvis: Normal urinary bladder.  No acute finding in the pelvis.  Uterus is   likely surgically absent.       Peritoneum/Retroperitoneum: Abdominal aorta is normal in caliber with no   evidence of aneurysmal dilatation or dissection.  No retroperitoneal   adenopathy or bleed. Josiah Staggers atherosclerotic abdominal aorta.       Bones/Soft Tissues: Significant osteopenic changes and degenerative changes   noted in the bony structures. Jearlean Courser 1 anterolisthesis of L4-L5 is   unchanged.  Mild-to-moderate lumbar degenerative changes.  No acute   fractures.  Moderate bilateral hip arthrosis right greater than left.           Impression   1. Patient with history of right upper lobe lung cancer, findings are   suspicious for progressive disease with recurrence in the right lung. 2. New small to moderate right pleural effusion.    3. Multiple new nodules in the right lower lobe of the lung suspicious for metastatic lesions. 4. Stable triangular atelectasis right upper lung.  Stable changes left upper   lobe. 5. Stable abdomen pelvis. Stable liver with hepatic cyst and 7 mm inferior   right hepatic lobe lesion which may represent liver metastasis. No new liver   lesions.             Pulmonary Functions Testing Results:    IMPRESSION AND RECOMMENDATIONS:     1. Recurrent lung cancer  -Patient had a repeat CT chest abdomen pelvis done on April 18, 2022 which showed new pulmonary nodules in the right lower lobe. -I personally reviewed these images and the largest pulmonary nodule is in the right lower lobe measuring about 1 cm in size.  -This nodule is very small and will be very difficult to biopsy either with bronchoscopy or CT-guided.  -She does have a right moderate pleural effusion which may be an easy target to sample.  -I will set her up for an ultrasound-guided thoracentesis. We will send the pleural fluid for cytology. -If pleural fluid shows malignant cells, patient already has stage IV disease. If pleural fluid is not able to give us the diagnosis, I will repeat a CT chest in 2 months time in order to see if the pulm nodule has become big enough to easily biopsy. Patient is agreeable with this plan. 2.  Metastatic primary lung cancer, right ([address]  -Management as per medical oncology. Return in about 2 months (around 6/27/2022) for COPD. Juan J Duvall MD  Pulmonary Critical Care and Sleep Medicine  4/27/2022, 3:42 PM    This note was completed using dragon medical speech recognition software. Grammatical errors, random word insertions, pronoun errors and incomplete sentences are occasional consequences of this technology due to software limitations. If there are questions or concerns about the content of this note of information contained within the body of this dictation they should be addressed with the provider for clarification.

## 2022-04-28 ENCOUNTER — TELEPHONE (OUTPATIENT)
Dept: INTERVENTIONAL RADIOLOGY/VASCULAR | Age: 72
End: 2022-04-28

## 2022-04-29 ENCOUNTER — HOSPITAL ENCOUNTER (OUTPATIENT)
Dept: GENERAL RADIOLOGY | Age: 72
Discharge: HOME | End: 2022-04-29
Payer: MEDICARE

## 2022-04-29 ENCOUNTER — HOSPITAL ENCOUNTER (OUTPATIENT)
Dept: INTERVENTIONAL RADIOLOGY/VASCULAR | Age: 72
Discharge: HOME | End: 2022-04-29
Payer: MEDICARE

## 2022-04-29 VITALS
OXYGEN SATURATION: 95 % | SYSTOLIC BLOOD PRESSURE: 165 MMHG | HEART RATE: 93 BPM | DIASTOLIC BLOOD PRESSURE: 98 MMHG | TEMPERATURE: 79.6 F | RESPIRATION RATE: 16 BRPM

## 2022-04-29 DIAGNOSIS — J90 PLEURAL EFFUSION: Primary | ICD-10-CM

## 2022-04-29 DIAGNOSIS — J90 PLEURAL EFFUSION, RIGHT: ICD-10-CM

## 2022-04-29 DIAGNOSIS — J90 PLEURAL EFFUSION ON RIGHT: ICD-10-CM

## 2022-04-29 LAB
APTT: 25.9 SEC (ref 26.2–38.6)
INR BLD: 1.02 (ref 0.88–1.12)
PLATELET # BLD: 254 K/UL (ref 135–450)
PROTHROMBIN TIME: 11.5 SEC (ref 9.9–12.7)

## 2022-04-29 PROCEDURE — 85730 THROMBOPLASTIN TIME PARTIAL: CPT

## 2022-04-29 PROCEDURE — 85610 PROTHROMBIN TIME: CPT

## 2022-04-29 PROCEDURE — 7100000011 HC PHASE II RECOVERY - ADDTL 15 MIN

## 2022-04-29 PROCEDURE — 32555 ASPIRATE PLEURA W/ IMAGING: CPT

## 2022-04-29 PROCEDURE — 7100000010 HC PHASE II RECOVERY - FIRST 15 MIN

## 2022-04-29 PROCEDURE — 49083 ABD PARACENTESIS W/IMAGING: CPT

## 2022-04-29 PROCEDURE — C1729 CATH, DRAINAGE: HCPCS

## 2022-04-29 PROCEDURE — 71045 X-RAY EXAM CHEST 1 VIEW: CPT

## 2022-04-29 PROCEDURE — 85049 AUTOMATED PLATELET COUNT: CPT

## 2022-04-29 PROCEDURE — 36415 COLL VENOUS BLD VENIPUNCTURE: CPT

## 2022-04-29 PROCEDURE — 2500000003 HC RX 250 WO HCPCS: Performed by: RADIOLOGY

## 2022-04-29 RX ORDER — LIDOCAINE HYDROCHLORIDE 10 MG/ML
20 INJECTION, SOLUTION EPIDURAL; INFILTRATION; INTRACAUDAL; PERINEURAL ONCE
Status: COMPLETED | OUTPATIENT
Start: 2022-04-29 | End: 2022-04-29

## 2022-04-29 RX ADMIN — LIDOCAINE HYDROCHLORIDE 8 ML: 10 INJECTION, SOLUTION EPIDURAL; INFILTRATION; INTRACAUDAL; PERINEURAL at 12:15

## 2022-04-29 NOTE — PROGRESS NOTES
Discharge note: Discharge order obtained, and discharge instructions reviewed. Patient educated, using the teach back method, about follow up instructions and discharge instructions. A completed copy of the AVS instructions given to patient and all questions answered. Discharged to lobby via wheel chair per transportation.

## 2022-04-29 NOTE — PROGRESS NOTES
Patient/report received from IR, vss, a/o, drsg to thoracentesis site with a spot of shadow drainage noted, will monitor

## 2022-05-03 ENCOUNTER — TELEPHONE (OUTPATIENT)
Dept: PULMONOLOGY | Age: 72
End: 2022-05-03

## 2022-05-03 NOTE — TELEPHONE ENCOUNTER
Spoke with patient. Informed pt of pathology results from thoracentesis did show cancer cells in fluid. Patient is scheduled with Dr. Kristina Lau at [facility] on 05/12/22.

## 2022-05-23 ENCOUNTER — OFFICE VISIT (OUTPATIENT)
Dept: INTERNAL MEDICINE CLINIC | Age: 72
End: 2022-05-23
Payer: MEDICARE

## 2022-05-23 VITALS
HEIGHT: 68 IN | SYSTOLIC BLOOD PRESSURE: 132 MMHG | HEART RATE: 96 BPM | DIASTOLIC BLOOD PRESSURE: 80 MMHG | WEIGHT: 118 LBS | BODY MASS INDEX: 17.88 KG/M2

## 2022-05-23 DIAGNOSIS — F33.1 MODERATE EPISODE OF RECURRENT MAJOR DEPRESSIVE DISORDER (HCC): ICD-10-CM

## 2022-05-23 DIAGNOSIS — C34.91 METASTATIC PRIMARY LUNG CANCER, RIGHT (HCC): ICD-10-CM

## 2022-05-23 DIAGNOSIS — M79.89 LEG SWELLING: Primary | ICD-10-CM

## 2022-05-23 PROCEDURE — 1123F ACP DISCUSS/DSCN MKR DOCD: CPT | Performed by: INTERNAL MEDICINE

## 2022-05-23 PROCEDURE — 3017F COLORECTAL CA SCREEN DOC REV: CPT | Performed by: INTERNAL MEDICINE

## 2022-05-23 PROCEDURE — 1090F PRES/ABSN URINE INCON ASSESS: CPT | Performed by: INTERNAL MEDICINE

## 2022-05-23 PROCEDURE — 1036F TOBACCO NON-USER: CPT | Performed by: INTERNAL MEDICINE

## 2022-05-23 PROCEDURE — 99214 OFFICE O/P EST MOD 30 MIN: CPT | Performed by: INTERNAL MEDICINE

## 2022-05-23 PROCEDURE — G8419 CALC BMI OUT NRM PARAM NOF/U: HCPCS | Performed by: INTERNAL MEDICINE

## 2022-05-23 PROCEDURE — G8399 PT W/DXA RESULTS DOCUMENT: HCPCS | Performed by: INTERNAL MEDICINE

## 2022-05-23 PROCEDURE — G8427 DOCREV CUR MEDS BY ELIG CLIN: HCPCS | Performed by: INTERNAL MEDICINE

## 2022-05-23 RX ORDER — OXYCODONE HCL 10 MG/1
10 TABLET, FILM COATED, EXTENDED RELEASE ORAL
COMMUNITY

## 2022-05-23 RX ORDER — POTASSIUM CHLORIDE 20 MEQ/1
20 TABLET, EXTENDED RELEASE ORAL DAILY
Qty: 30 TABLET | Refills: 0 | Status: SHIPPED | OUTPATIENT
Start: 2022-05-23

## 2022-05-23 RX ORDER — FUROSEMIDE 20 MG/1
20 TABLET ORAL DAILY
Qty: 30 TABLET | Refills: 0 | Status: SHIPPED | OUTPATIENT
Start: 2022-05-23

## 2022-05-23 SDOH — ECONOMIC STABILITY: FOOD INSECURITY: WITHIN THE PAST 12 MONTHS, YOU WORRIED THAT YOUR FOOD WOULD RUN OUT BEFORE YOU GOT MONEY TO BUY MORE.: NEVER TRUE

## 2022-05-23 SDOH — ECONOMIC STABILITY: FOOD INSECURITY: WITHIN THE PAST 12 MONTHS, THE FOOD YOU BOUGHT JUST DIDN'T LAST AND YOU DIDN'T HAVE MONEY TO GET MORE.: NEVER TRUE

## 2022-05-23 ASSESSMENT — PATIENT HEALTH QUESTIONNAIRE - PHQ9
SUM OF ALL RESPONSES TO PHQ9 QUESTIONS 1 & 2: 5
1. LITTLE INTEREST OR PLEASURE IN DOING THINGS: 3
5. POOR APPETITE OR OVEREATING: 2
9. THOUGHTS THAT YOU WOULD BE BETTER OFF DEAD, OR OF HURTING YOURSELF: 0
SUM OF ALL RESPONSES TO PHQ QUESTIONS 1-9: 15
SUM OF ALL RESPONSES TO PHQ QUESTIONS 1-9: 15
7. TROUBLE CONCENTRATING ON THINGS, SUCH AS READING THE NEWSPAPER OR WATCHING TELEVISION: 2
3. TROUBLE FALLING OR STAYING ASLEEP: 2
4. FEELING TIRED OR HAVING LITTLE ENERGY: 3
6. FEELING BAD ABOUT YOURSELF - OR THAT YOU ARE A FAILURE OR HAVE LET YOURSELF OR YOUR FAMILY DOWN: 1
2. FEELING DOWN, DEPRESSED OR HOPELESS: 2
8. MOVING OR SPEAKING SO SLOWLY THAT OTHER PEOPLE COULD HAVE NOTICED. OR THE OPPOSITE, BEING SO FIGETY OR RESTLESS THAT YOU HAVE BEEN MOVING AROUND A LOT MORE THAN USUAL: 0
SUM OF ALL RESPONSES TO PHQ QUESTIONS 1-9: 15
SUM OF ALL RESPONSES TO PHQ QUESTIONS 1-9: 15

## 2022-05-23 ASSESSMENT — SOCIAL DETERMINANTS OF HEALTH (SDOH): HOW HARD IS IT FOR YOU TO PAY FOR THE VERY BASICS LIKE FOOD, HOUSING, MEDICAL CARE, AND HEATING?: NOT HARD AT ALL

## 2022-05-23 NOTE — PATIENT INSTRUCTIONS
Begin lasix 20mg once daily and Potassium 20meq daily for leg swelling. You will need a blood test in a week to make sure your kidneys and electrolytes are doing OK.

## 2022-05-23 NOTE — PROGRESS NOTES
Chief Complaint   Patient presents with    Depression    Hyperlipidemia    Leg Swelling     bilateral leg swelling off and on for about 2 mos. - feeling very tight, area on rt leg redness     HPI:  Progressive leg swelling over the past two months. It is not painful. Metastatic lung ca: Recent CT imaging study showed recurrence of right-sided lung mass with pulmonary mets and suspected liver mets. Her oncologist advised against chemotherapy. She will have follow-up with him tomorrow. Her pain medication has been increased since her last visit and she reports improved pain control. Depression: she reports she is doing better since starting duloxetine. She feels this has helped.      Social History     Tobacco Use    Smoking status: Former Smoker     Packs/day: 1.50     Years: 40.00     Pack years: 60.00     Types: Cigarettes     Quit date: 2007     Years since quittin.9    Smokeless tobacco: Never Used   Vaping Use    Vaping Use: Never used   Substance Use Topics    Alcohol use: No     Comment: none     Drug use: No       ROS:  Pain is under better control since increasing MS Contin and oxycodone  Reg BMs    EXAM  /80   Pulse 96   Ht 5' 8\" (1.727 m)   Wt 118 lb (53.5 kg)   BMI 17.94 kg/m²    General: Nondistressed  Cardiovascular: Regular rate and rhythm, 2+ pitting bilateral lower extremity edema  Respiratory: Clear to auscultation bilaterally      Lab Results   Component Value Date    CREATININE 0.5 (L) 2022    BUN 10 2021     2021    K 3.8 2021     2021    CO2 28 2021     Lab Results   Component Value Date    CHOL 151 2021    CHOL 171 2020    CHOL 170 2019     Lab Results   Component Value Date    TRIG 119 2021    TRIG 111 2020    TRIG 113 2019     Lab Results   Component Value Date    HDL 54 2021    HDL 62 (H) 2020    HDL 65 (H) 2019     Lab Results   Component Value Date LDLCALC 73 11/22/2021    LDLCALC 87 11/17/2020    LDLCALC 82 03/14/2019     Lab Results   Component Value Date    LABVLDL 24 11/22/2021    LABVLDL 22 11/17/2020    LABVLDL 23 03/14/2019     Lab Results   Component Value Date    SHANNENO 2.6 06/15/2015        A/P  1. Leg swelling  Bilateral progressive pitting lower extremity edema. This may be related to her underlying metastatic cancer. Begin Lasix 20 mg daily, potassium chloride 20 mEq daily, check a renal panel at the end of the week to monitor kidney function electrolytes. - Renal Function Panel    2. Metastatic primary lung cancer, right [facility])  Unfortunately recent imaging shows recurrence of primary malignancy with active metastases. Oncology has recommended against chemotherapy. She is going to follow-up with oncology tomorrow. We discussed a hospice referral pending her oncology appointment tomorrow. She will follow-up with us after her oncology appointment. 3. Moderate episode of recurrent major depressive disorder [facility])  Although her PHQ-9 score is elevated, she appears to be coping well with her recent diagnosis, and she has plans to be with family over the next several months. She feels better since switching to duloxetine. She declines additional appointment with Dr. Lamont Boast. Continue current treatment at this time.     Appropriate timing of follow-up appointment pending her oncology assessment tomorrow

## 2022-05-27 LAB
ALBUMIN SERPL-MCNC: 3.8 G/DL (ref 3.4–5)
ANION GAP SERPL CALCULATED.3IONS-SCNC: 12 MMOL/L (ref 3–16)
BUN BLDV-MCNC: 11 MG/DL (ref 7–20)
CALCIUM SERPL-MCNC: 9.4 MG/DL (ref 8.3–10.6)
CHLORIDE BLD-SCNC: 100 MMOL/L (ref 99–110)
CO2: 30 MMOL/L (ref 21–32)
CREAT SERPL-MCNC: <0.5 MG/DL (ref 0.6–1.2)
GFR AFRICAN AMERICAN: >60
GFR NON-AFRICAN AMERICAN: >60
GLUCOSE BLD-MCNC: 110 MG/DL (ref 70–99)
PHOSPHORUS: 4.3 MG/DL (ref 2.5–4.9)
POTASSIUM SERPL-SCNC: 4.3 MMOL/L (ref 3.5–5.1)
SODIUM BLD-SCNC: 142 MMOL/L (ref 136–145)

## 2022-05-29 ENCOUNTER — TELEPHONE (OUTPATIENT)
Dept: INTERNAL MEDICINE CLINIC | Age: 72
End: 2022-05-29

## 2022-05-29 NOTE — TELEPHONE ENCOUNTER
Hospice call    Admitted to [facility]. Need orders OK'd    DC atorvastatin. Change oxycodone to 1-2 every 4 hours prn.

## 2022-05-31 NOTE — TELEPHONE ENCOUNTER
Contacted patient. She states they will be out today to assess her. Had a visit the other day but with the holiday they will be coming today. She will call us if she feels the need too.

## 2022-05-31 NOTE — TELEPHONE ENCOUNTER
Please contact patient to make sure she does not have any needs at this time. I will sign hospice orders.

## 2022-06-14 ENCOUNTER — TELEPHONE (OUTPATIENT)
Dept: INTERNAL MEDICINE CLINIC | Age: 72
End: 2022-06-14

## 2022-06-14 NOTE — TELEPHONE ENCOUNTER
I spoke with Michelle Duarte. She stated that in general this pain regimen has been working well, but this afternoon she had an increase in pain. She is almost due for her next dose of oxycodone which will be administered shortly. Her pain will be monitored by hospice over the next 24 hours and depending on the circumstances we could consider increasing the MS Contin, changing the oxycodone to an alternative short acting such as Dilaudid, or increasing the dose of the oxycodone.

## 2022-06-14 NOTE — TELEPHONE ENCOUNTER
Kyle Ryan with [address] is calling to speak with Dr Rain Dennis regarding patient's pain medication. She states patient takes oxycodone 10 mg, 1-2 tab every 4 hours PRN. She states patient's sister gave patient 2 tabs at 1 pm and patient still has pain 6-7 out of 10. Kyle Ryan is requesting a call back at #[phone].

## 2022-06-21 ENCOUNTER — TELEPHONE (OUTPATIENT)
Dept: INTERNAL MEDICINE CLINIC | Age: 72
End: 2022-06-21

## 2022-06-21 NOTE — TELEPHONE ENCOUNTER
Dalila Dan from [facility] calling---pt has SOB_cough-congestion---they want to order oxygen-duonebs and Robitusen DM---please call [employer] at [phone].   Thanks

## 2022-09-28 NOTE — PROGRESS NOTES
Pre-op interview done. Verified allergies, medications and past medical history. Instructed on procedure, npo status, medications with restrictions and transportation. Questions answered, verbalized understanding. supervision

## (undated) DEVICE — BLANKET WRM W40.2XL55.9IN IORT LO BODY + MISTRAL AIR

## (undated) DEVICE — HYPODERMIC SAFETY NEEDLE: Brand: MAGELLAN

## (undated) DEVICE — Z CONVERTED USE 2275871 SPONGE GZ W4XL4IN WHT 8 PLY CURITY

## (undated) DEVICE — NEEDLE ASPIR 19GA HISTOLOGY FLX VIZISHOT 2

## (undated) DEVICE — YANKAUER SUCTION INSTRUMENT NO CONTROL VENT, BULB TIP, CLEAR: Brand: YANKAUER

## (undated) DEVICE — NEEDLE HYPO 18GA L1.5IN THN WALL PIVOTING SHLD BVL ORIENTED

## (undated) DEVICE — COVER,MAYO STAND,STERILE: Brand: MEDLINE

## (undated) DEVICE — 6 ML SYRINGE LUER-LOCK TIP: Brand: MONOJECT

## (undated) DEVICE — 60 ML SYRINGE,REGULAR TIP: Brand: MONOJECT

## (undated) DEVICE — PROCEDURE KIT ENDOSCP CUST

## (undated) DEVICE — COAGULATOR SUCT 10FR LAIN FTSWCH ACTIVATION DISP VALLEYLAB

## (undated) DEVICE — GLOVE SURG SZ 7 L11.33IN FNGR THK9.8MIL STRW LTX POLYMER

## (undated) DEVICE — ELECTRODE PT RET AD L9FT HI MOIST COND ADH HYDRGEL CORDED

## (undated) DEVICE — FORCEPS L110MM DIA1.7MM PREMARKED REINF SHFT

## (undated) DEVICE — CORD ES L10FT MPLR LAP

## (undated) DEVICE — Z CONVERTED USE 2276060 DRESSING NONADHESIVE W3XL4IN WHT COT OUCHLSS RECT BONDED

## (undated) DEVICE — SKIN AFFIX SURG ADHESIVE 72/CS 0.55ML: Brand: MEDLINE

## (undated) DEVICE — 3M™ IOBAN™ 2 ANTIMICROBIAL INCISE DRAPE 6650EZ: Brand: IOBAN™ 2

## (undated) DEVICE — SINGLE USE SUCTION VALVE MAJ-209: Brand: SINGLE USE SUCTION VALVE (STERILE)

## (undated) DEVICE — CONMED CHANNEL MASTER PULMONARY AND PEDIATRIC CLEANING BRUSH, 160 CM X 2.0 MM: Brand: CONMED

## (undated) DEVICE — CATHETER DIAG 180DEG FIRM TIP EWC EDGE 180 SDK4000FT] SUPERDIMENSION INC]

## (undated) DEVICE — PACK PROCEDURE SURG EXTREMITY MFFOP CUST

## (undated) DEVICE — STERILE LATEX POWDER-FREE SURGICAL GLOVESWITH NITRILE COATING: Brand: PROTEXIS

## (undated) DEVICE — TOWEL,OR,DSP,ST,BLUE,STD,6/PK,12PK/CS: Brand: MEDLINE

## (undated) DEVICE — DRAPE,T,LAPARO,TRANS,STERILE: Brand: MEDLINE

## (undated) DEVICE — SUTURE PERMAHAND SZ 3-0 L30IN NONABSORBABLE BLK SILK BRAID A304H

## (undated) DEVICE — DRESSING WND SM W2.5XL4IN BRTH W/ CATH SECUREMENT AND

## (undated) DEVICE — BRUSH CYTO FLX DISP SUPERDIMENSION

## (undated) DEVICE — MACROBORE EXTN SET W/ 1 SMRTSITE NEEDLE-FREE VLV PRT

## (undated) DEVICE — APPLICATOR MEDICATED 3 CC SOLUTION CLR STRL CHLORAPREP

## (undated) DEVICE — 3M™ STERI-DRAPE™ INSTRUMENT POUCH 1018: Brand: STERI-DRAPE™

## (undated) DEVICE — SUPPORT WRST AD W3.5XL9IN DIA14.5IN ART SFT ADJ HK AND LOOP

## (undated) DEVICE — SUTURE PERMA-HAND SZ 4-0 L144IN NONABSORBABLE BLK LIGAPAK LA53G

## (undated) DEVICE — GAUZE,SPONGE,4"X4",8PLY,STRL,LF,10/TRAY: Brand: MEDLINE

## (undated) DEVICE — PATCH SENS PT FOR ELECTROMAGNETIC NAVIGATION BRONCHSCP SYS

## (undated) DEVICE — Device: Brand: BALLOON

## (undated) DEVICE — KIT OR ROOM TURNOVER W/STRAP

## (undated) DEVICE — Z DISCONTINUED USE 2516375 APPLICATOR MEDICATED 3 CC CLR STRL CHLORAPREP

## (undated) DEVICE — STERILE POLYISOPRENE POWDER-FREE SURGICAL GLOVES: Brand: PROTEXIS

## (undated) DEVICE — Device: Brand: MEDEX

## (undated) DEVICE — SET LBLING PEN POLYPR LBL PAL

## (undated) DEVICE — 3 ML SYRINGE LUER-LOCK TIP: Brand: MONOJECT

## (undated) DEVICE — PAD GRND NEUT ELECTRD AD DISP

## (undated) DEVICE — SET CATH 20GA L1.75IN RAD ART POLYUR RADPQ W/ INTEGR

## (undated) DEVICE — APPLICATOR PREP 26ML 0.7% IOD POVACRYLEX 74% ISO ALC ST

## (undated) DEVICE — ADAPTER TBNG DIA15MM SWVL FBROPT BRONCHSCP TERM 2 AXIS PEEP

## (undated) DEVICE — TOWEL,OR,DSP,ST,BLUE,STD,4/PK,20PK/CS: Brand: MEDLINE

## (undated) DEVICE — SYRINGE, LUER LOCK, 30ML: Brand: MEDLINE

## (undated) DEVICE — GOWN AURORA NONREINF LG: Brand: MEDLINE INDUSTRIES, INC.

## (undated) DEVICE — CATHETER IV 18GA L1.25N GREEN FEP SFTY STRGHT HUB RDPQUE DSP

## (undated) DEVICE — SOLUTION IV IRRIG WATER 1000ML POUR BRL 2F7114

## (undated) DEVICE — DRAPE,LAP,CHOLE,W/TROUGHS,STERILE: Brand: MEDLINE

## (undated) DEVICE — SPECIMEN TRAP: Brand: ARGYLE

## (undated) DEVICE — SINGLE USE BIOPSY VALVE MAJ-210: Brand: SINGLE USE BIOPSY VALVE (STERILE)

## (undated) DEVICE — STANDARD HYPODERMIC NEEDLE,POLYPROPYLENE HUB: Brand: MONOJECT

## (undated) DEVICE — Z CONVERTED USE 2271043 CONTAINER SPEC COLL 4OZ SCR ON LID PEEL PCH

## (undated) DEVICE — SUTURE VCRL SZ 4-0 L18IN ABSRB UD L19MM PS-2 3/8 CIR PRIM J496H

## (undated) DEVICE — GAUZE,SPONGE,8"X4",12PLY,XRAY,STRL,LF: Brand: MEDLINE

## (undated) DEVICE — ADAPTER BRONCHSCP FOR USE W/ OLY EDGE

## (undated) DEVICE — GLOVE SURG SZ 75 L12IN FNGR THK94MIL STD WHT LTX FREE

## (undated) DEVICE — NEEDLE HYPO 25GA L1.5IN BLU POLYPR HUB S STL REG BVL STR

## (undated) DEVICE — SYRINGE, LUER LOCK, 10ML: Brand: MEDLINE

## (undated) DEVICE — TOWEL,OR,DSP,ST,BLUE,DLX,10/PK,8PK/CS: Brand: MEDLINE

## (undated) DEVICE — BLADE ES ELASTOMERIC COAT INSUL DURABLE BEND UPTO 90DEG

## (undated) DEVICE — SUTURE VCRL SZ 3-0 L27IN ABSRB UD L26MM SH 1/2 CIR J416H

## (undated) DEVICE — TRAY CATH 16FR COMPLT CARE URIN M TEMP SENS STATLOK STBL

## (undated) DEVICE — PRESSURE MONITORING SET: Brand: TRUWAVE

## (undated) DEVICE — NEEDLE RF 22GA L5CM TIP L5MM CVD ACT TIP SL

## (undated) DEVICE — OPTIFOAM GENTLE LIQUITRAP, SACRUM, 7"X7": Brand: MEDLINE

## (undated) DEVICE — SYRINGE MED 10ML POLYPR LUERSLIP TIP FLAT TOP W/O SFTY DISP